# Patient Record
Sex: FEMALE | Race: WHITE | HISPANIC OR LATINO | Employment: UNEMPLOYED | URBAN - METROPOLITAN AREA
[De-identification: names, ages, dates, MRNs, and addresses within clinical notes are randomized per-mention and may not be internally consistent; named-entity substitution may affect disease eponyms.]

---

## 2017-05-10 ENCOUNTER — ALLSCRIPTS OFFICE VISIT (OUTPATIENT)
Dept: OTHER | Facility: OTHER | Age: 27
End: 2017-05-10

## 2017-08-16 ENCOUNTER — ALLSCRIPTS OFFICE VISIT (OUTPATIENT)
Dept: OTHER | Facility: OTHER | Age: 27
End: 2017-08-16

## 2017-08-16 DIAGNOSIS — Z34.91 ENCOUNTER FOR SUPERVISION OF NORMAL PREGNANCY IN FIRST TRIMESTER: ICD-10-CM

## 2017-08-24 ENCOUNTER — APPOINTMENT (OUTPATIENT)
Dept: LAB | Facility: HOSPITAL | Age: 27
End: 2017-08-24
Payer: COMMERCIAL

## 2017-08-24 ENCOUNTER — TRANSCRIBE ORDERS (OUTPATIENT)
Dept: LAB | Facility: HOSPITAL | Age: 27
End: 2017-08-24

## 2017-08-24 DIAGNOSIS — Z34.91 ENCOUNTER FOR SUPERVISION OF NORMAL PREGNANCY IN FIRST TRIMESTER, UNSPECIFIED GRAVIDITY: ICD-10-CM

## 2017-08-24 DIAGNOSIS — Z34.91 ENCOUNTER FOR SUPERVISION OF NORMAL PREGNANCY IN FIRST TRIMESTER, UNSPECIFIED GRAVIDITY: Primary | ICD-10-CM

## 2017-08-24 DIAGNOSIS — Z34.91 ENCOUNTER FOR SUPERVISION OF NORMAL PREGNANCY IN FIRST TRIMESTER: ICD-10-CM

## 2017-08-24 LAB
ABO GROUP BLD: NORMAL
BASOPHILS # BLD AUTO: 0.02 THOUSANDS/ΜL (ref 0–0.1)
BASOPHILS NFR BLD AUTO: 0 % (ref 0–1)
BILIRUB UR QL STRIP: NEGATIVE
BLD GP AB SCN SERPL QL: NEGATIVE
CLARITY UR: CLEAR
COLOR UR: YELLOW
EOSINOPHIL # BLD AUTO: 0.1 THOUSAND/ΜL (ref 0–0.61)
EOSINOPHIL NFR BLD AUTO: 2 % (ref 0–6)
ERYTHROCYTE [DISTWIDTH] IN BLOOD BY AUTOMATED COUNT: 13.7 % (ref 11.6–15.1)
GLUCOSE 1H P 50 G GLC PO SERPL-MCNC: 85 MG/DL
GLUCOSE UR STRIP-MCNC: NEGATIVE MG/DL
HBV SURFACE AG SER QL: NORMAL
HCT VFR BLD AUTO: 37.6 % (ref 34.8–46.1)
HGB BLD-MCNC: 12.5 G/DL (ref 11.5–15.4)
HGB UR QL STRIP.AUTO: NEGATIVE
KETONES UR STRIP-MCNC: NEGATIVE MG/DL
LEUKOCYTE ESTERASE UR QL STRIP: NEGATIVE
LYMPHOCYTES # BLD AUTO: 1.62 THOUSANDS/ΜL (ref 0.6–4.47)
LYMPHOCYTES NFR BLD AUTO: 26 % (ref 14–44)
MCH RBC QN AUTO: 29.9 PG (ref 26.8–34.3)
MCHC RBC AUTO-ENTMCNC: 33.2 G/DL (ref 31.4–37.4)
MCV RBC AUTO: 90 FL (ref 82–98)
MONOCYTES # BLD AUTO: 0.46 THOUSAND/ΜL (ref 0.17–1.22)
MONOCYTES NFR BLD AUTO: 7 % (ref 4–12)
NEUTROPHILS # BLD AUTO: 4.1 THOUSANDS/ΜL (ref 1.85–7.62)
NEUTS SEG NFR BLD AUTO: 65 % (ref 43–75)
NITRITE UR QL STRIP: NEGATIVE
NRBC BLD AUTO-RTO: 0 /100 WBCS
PH UR STRIP.AUTO: 6 [PH] (ref 4.5–8)
PLATELET # BLD AUTO: 239 THOUSANDS/UL (ref 149–390)
PMV BLD AUTO: 11.2 FL (ref 8.9–12.7)
PROT UR STRIP-MCNC: NEGATIVE MG/DL
RBC # BLD AUTO: 4.18 MILLION/UL (ref 3.81–5.12)
RH BLD: POSITIVE
RUBV IGG SERPL IA-ACNC: >175 IU/ML
SP GR UR STRIP.AUTO: 1.02 (ref 1–1.03)
SPECIMEN EXPIRATION DATE: NORMAL
UROBILINOGEN UR QL STRIP.AUTO: 0.2 E.U./DL
WBC # BLD AUTO: 6.31 THOUSAND/UL (ref 4.31–10.16)

## 2017-08-24 PROCEDURE — 87086 URINE CULTURE/COLONY COUNT: CPT

## 2017-08-24 PROCEDURE — 80081 OBSTETRIC PANEL INC HIV TSTG: CPT

## 2017-08-24 PROCEDURE — 81003 URINALYSIS AUTO W/O SCOPE: CPT

## 2017-08-24 PROCEDURE — 82950 GLUCOSE TEST: CPT

## 2017-08-24 PROCEDURE — 36415 COLL VENOUS BLD VENIPUNCTURE: CPT

## 2017-08-25 LAB
BACTERIA UR CULT: NORMAL
RPR SER QL: NORMAL

## 2017-08-27 LAB — HIV 1+2 AB+HIV1 P24 AG SERPL QL IA: NORMAL

## 2017-08-30 ENCOUNTER — GENERIC CONVERSION - ENCOUNTER (OUTPATIENT)
Dept: OTHER | Facility: OTHER | Age: 27
End: 2017-08-30

## 2017-08-30 ENCOUNTER — ALLSCRIPTS OFFICE VISIT (OUTPATIENT)
Dept: OTHER | Facility: OTHER | Age: 27
End: 2017-08-30

## 2017-09-07 ENCOUNTER — GENERIC CONVERSION - ENCOUNTER (OUTPATIENT)
Dept: OTHER | Facility: OTHER | Age: 27
End: 2017-09-07

## 2017-09-07 LAB — CLINICAL REPORT (HISTORICAL): NORMAL

## 2017-09-27 ENCOUNTER — GENERIC CONVERSION - ENCOUNTER (OUTPATIENT)
Dept: OTHER | Facility: OTHER | Age: 27
End: 2017-09-27

## 2017-10-25 ENCOUNTER — GENERIC CONVERSION - ENCOUNTER (OUTPATIENT)
Dept: OTHER | Facility: OTHER | Age: 27
End: 2017-10-25

## 2017-10-25 ENCOUNTER — APPOINTMENT (OUTPATIENT)
Dept: LAB | Facility: HOSPITAL | Age: 27
End: 2017-10-25
Payer: COMMERCIAL

## 2017-10-25 DIAGNOSIS — Z34.92 ENCOUNTER FOR SUPERVISION OF NORMAL PREGNANCY IN SECOND TRIMESTER: ICD-10-CM

## 2017-10-25 PROCEDURE — 36415 COLL VENOUS BLD VENIPUNCTURE: CPT

## 2017-10-25 PROCEDURE — 86336 INHIBIN A: CPT

## 2017-10-25 PROCEDURE — 82105 ALPHA-FETOPROTEIN SERUM: CPT

## 2017-10-25 PROCEDURE — 82677 ASSAY OF ESTRIOL: CPT

## 2017-10-25 PROCEDURE — 84702 CHORIONIC GONADOTROPIN TEST: CPT

## 2017-10-30 LAB
2ND TRIMESTER 4 SCREEN SERPL-IMP: NORMAL
2ND TRIMESTER 4 SCREEN SERPL-IMP: NORMAL
AFP ADJ MOM SERPL: 0.87
AFP SERPL-MCNC: 32 NG/ML
AGE AT DELIVERY: 30 YEARS
FET TS 18 RISK FROM MAT AGE: NORMAL
FET TS 21 RISK FROM MAT AGE: 699
GA METHOD: NORMAL
GA: 18.4 WEEKS
HCG ADJ MOM SERPL: 1.61
HCG SERPL-ACNC: NORMAL MIU/ML
IDDM PATIENT QL: NO
INHIBIN A ADJ MOM SERPL: 0.94
INHIBIN A SERPL-MCNC: 135.51 PG/ML
KARYOTYP BLD/T: NORMAL
MULTIPLE PREGNANCY: NO
NEURAL TUBE DEFECT RISK FETUS: NORMAL %
SERVICE CMNT-IMP: NORMAL
TS 18 RISK FETUS: NORMAL
TS 21 RISK FETUS: 309
U ESTRIOL ADJ MOM SERPL: 0.07
U ESTRIOL SERPL-MCNC: 0.09 NG/ML

## 2017-11-08 ENCOUNTER — ALLSCRIPTS OFFICE VISIT (OUTPATIENT)
Dept: PERINATAL CARE | Facility: CLINIC | Age: 27
End: 2017-11-08
Payer: COMMERCIAL

## 2017-11-08 ENCOUNTER — GENERIC CONVERSION - ENCOUNTER (OUTPATIENT)
Dept: OTHER | Facility: OTHER | Age: 27
End: 2017-11-08

## 2017-11-08 PROCEDURE — 76811 OB US DETAILED SNGL FETUS: CPT | Performed by: OBSTETRICS & GYNECOLOGY

## 2017-11-08 PROCEDURE — 76817 TRANSVAGINAL US OBSTETRIC: CPT | Performed by: OBSTETRICS & GYNECOLOGY

## 2017-11-22 ENCOUNTER — ALLSCRIPTS OFFICE VISIT (OUTPATIENT)
Dept: OTHER | Facility: OTHER | Age: 27
End: 2017-11-22

## 2017-12-06 ENCOUNTER — APPOINTMENT (OUTPATIENT)
Dept: PERINATAL CARE | Facility: CLINIC | Age: 27
End: 2017-12-06
Payer: COMMERCIAL

## 2017-12-06 ENCOUNTER — GENERIC CONVERSION - ENCOUNTER (OUTPATIENT)
Dept: OTHER | Facility: OTHER | Age: 27
End: 2017-12-06

## 2017-12-06 PROCEDURE — 76816 OB US FOLLOW-UP PER FETUS: CPT | Performed by: OBSTETRICS & GYNECOLOGY

## 2017-12-26 DIAGNOSIS — Z34.92 ENCOUNTER FOR SUPERVISION OF NORMAL PREGNANCY IN SECOND TRIMESTER: ICD-10-CM

## 2017-12-27 ENCOUNTER — GENERIC CONVERSION - ENCOUNTER (OUTPATIENT)
Dept: OTHER | Facility: OTHER | Age: 27
End: 2017-12-27

## 2018-01-08 ENCOUNTER — APPOINTMENT (OUTPATIENT)
Dept: LAB | Facility: HOSPITAL | Age: 28
End: 2018-01-08
Payer: COMMERCIAL

## 2018-01-08 ENCOUNTER — TRANSCRIBE ORDERS (OUTPATIENT)
Dept: LAB | Facility: HOSPITAL | Age: 28
End: 2018-01-08

## 2018-01-08 DIAGNOSIS — Z34.92 ENCOUNTER FOR SUPERVISION OF NORMAL PREGNANCY IN SECOND TRIMESTER: ICD-10-CM

## 2018-01-08 LAB
BASOPHILS # BLD AUTO: 0.01 THOUSANDS/ΜL (ref 0–0.1)
BASOPHILS NFR BLD AUTO: 0 % (ref 0–1)
EOSINOPHIL # BLD AUTO: 0.15 THOUSAND/ΜL (ref 0–0.61)
EOSINOPHIL NFR BLD AUTO: 2 % (ref 0–6)
ERYTHROCYTE [DISTWIDTH] IN BLOOD BY AUTOMATED COUNT: 13.7 % (ref 11.6–15.1)
GLUCOSE 1H P 50 G GLC PO SERPL-MCNC: 119 MG/DL
HCT VFR BLD AUTO: 34.5 % (ref 34.8–46.1)
HGB BLD-MCNC: 11.6 G/DL (ref 11.5–15.4)
LYMPHOCYTES # BLD AUTO: 1.02 THOUSANDS/ΜL (ref 0.6–4.47)
LYMPHOCYTES NFR BLD AUTO: 12 % (ref 14–44)
MCH RBC QN AUTO: 30.6 PG (ref 26.8–34.3)
MCHC RBC AUTO-ENTMCNC: 33.6 G/DL (ref 31.4–37.4)
MCV RBC AUTO: 91 FL (ref 82–98)
MONOCYTES # BLD AUTO: 0.59 THOUSAND/ΜL (ref 0.17–1.22)
MONOCYTES NFR BLD AUTO: 7 % (ref 4–12)
NEUTROPHILS # BLD AUTO: 6.88 THOUSANDS/ΜL (ref 1.85–7.62)
NEUTS SEG NFR BLD AUTO: 79 % (ref 43–75)
NRBC BLD AUTO-RTO: 0 /100 WBCS
PLATELET # BLD AUTO: 217 THOUSANDS/UL (ref 149–390)
PMV BLD AUTO: 11 FL (ref 8.9–12.7)
RBC # BLD AUTO: 3.79 MILLION/UL (ref 3.81–5.12)
WBC # BLD AUTO: 8.67 THOUSAND/UL (ref 4.31–10.16)

## 2018-01-08 PROCEDURE — 82950 GLUCOSE TEST: CPT

## 2018-01-08 PROCEDURE — 36415 COLL VENOUS BLD VENIPUNCTURE: CPT

## 2018-01-08 PROCEDURE — 86592 SYPHILIS TEST NON-TREP QUAL: CPT

## 2018-01-08 PROCEDURE — 85025 COMPLETE CBC W/AUTO DIFF WBC: CPT

## 2018-01-09 LAB — RPR SER QL: NORMAL

## 2018-01-09 NOTE — MISCELLANEOUS
Provider Comments  Provider Comments:   patient was a no show on 11/22/2017  Attempted to call patient   phone number not working        Signatures   Electronically signed by : Branden Alexandra, ; Nov 22 2017 11:41AM EST                       (Author)

## 2018-01-10 ENCOUNTER — GENERIC CONVERSION - ENCOUNTER (OUTPATIENT)
Dept: OTHER | Facility: OTHER | Age: 28
End: 2018-01-10

## 2018-01-11 NOTE — PROGRESS NOTES
2017         RE: Casandra Hurtado                                 To: [address]    MR#: [id number]                                    [address] [place] [address]   : [facility], [address]   ENC: [id number]                             Fax: [phone]   (Exam #: [id number])      The LMP of this 32year old,  G3, P2-0-0-2 patient was 2017, giving   her an YARI of MAR 25 2018 and a current gestational age of 25 weeks 3 days   by dates  A sonographic examination was performed on 2017 using real   time equipment  The ultrasound examination was performed using abdominal &   vaginal techniques  The patient has a BMI of 40 8  Her blood pressure   today was 121/77  Earliest US on record: 17  10w3d  3-25-18EDD      Abena is on prenatal vitamins, a calcium supplement and a folic acid   supplement and denies any allergies to drugs  she received a flu shot in   this pregnancy  She reports an OB history that includes 2 prior    section here at [facility] in  and   Her babies weighed   6 lbs  9 oz  and 7 lbs  3 oz her past medical history includes morbid   obesity with a BMI of 40  Her surgical history includes 2 prior   C-sections  She denies any first generation family history of   hypertension, diabetes, thrombosis or congenital anomalies  She had a   normal quad screen in this pregnancy with a one in 309 risk for Jonetta Both, one   in 2723 risk for trace me 18 and a one in 10,000 risk for neural tube   defects  She had a normal early Glucola of 85  She received a flu shot in   this pregnancy        Cardiac motion was observed at 144 bpm       INDICATIONS      fetal anatomical survey   Previous C/S x 2   morbid obesity      Exam Types      Transvaginal   LEVEL II      RESULTS      Fetus # 1 of 1   Breech presentation   Fetal growth appeared normal   Placenta Location = Posterior   No placenta previa   Placenta Grade = II      MEASUREMENTS (* Included In Average GA)      AC              15 0 cm        20 weeks 0 days* (42%)   BPD              4 7 cm        20 weeks 1 day * (45%)   HC              17 9 cm        20 weeks 1 day * (43%)   Femur            3 2 cm        20 weeks 1 day * (36%)      Nuchal Fold      3 1 mm      Humerus          3 1 cm        20 weeks 4 days  (52%)      Cerebellum       2 1 cm        20 weeks 6 days   Biorbit          3 1 cm        20 weeks 1 day   CisternaMagna    6 0 mm      HC/AC           1 19   FL/AC           0 21   FL/BPD          0 68   EFW (Ac/Fl/Hc)   333 grams - 0 lbs 12 oz      THE AVERAGE GESTATIONAL AGE is 20 weeks 1 day +/- 10 days  AMNIOTIC FLUID      Q1:          Q2:          Q3: 4 3      Q4:   BREANN Total = 4 3 cm   Largest Vertical Pocket = 4 3 cm   Amniotic Fluid: Normal      CERVICAL EVALUATION      SUPINE      Cervical Length: 4 90 cm      OTHER TEST RESULTS           Funneling?: No             Dynamic Changes?: No        Resp  To TFP?: No      ANATOMY      Head                                    Normal   Face/Neck                               See Details   Th  Cav  Normal   Heart                                   See Details   Abd  Cav  Normal   Stomach                                 Normal   Right Kidney                            Normal   Left Kidney                             Normal   Bladder                                 Normal   Abd  Wall                               Normal   Spine                                   Normal   Extrems                                 See Details   Genitalia                               Normal   Placenta                                Normal   Umbl   Cord                              Normal   Uterus                                  Normal   PCI                                     Not Visualized      ANATOMY DETAILS      Visualized Appearing Sonographically Normal:   HEAD: (Calvarium, BPD Level, Cavum, Lateral Ventricles, Choroid Plexus,   Cerebellum, Cisterna Magna);    FACE/NECK: (Neck, Nuchal Fold, Orbits,   Face);    TH  CAV  : (Lungs, Diaphragm); HEART: (3VV, 3 Vessel Trachea,   Cardiac Axis, Cardiac Position);    ABD  CAV : (Liver);    STOMACH, RIGHT   KIDNEY, LEFT KIDNEY, BLADDER, ABD  WALL, SPINE: (Cervical Spine, Thoracic   Spine, Lumbar Spine, Sacrum);    EXTREMS: (Lt Humerus, Rt Humerus, Lt   Forearm, Rt Forearm, Lt Femur, Rt Femur, Lt Low Leg, Rt Low Leg);      GENITALIA, PLACENTA, UMBL  CORD, UTERUS      Suboptimally Visualized:   HEART: (Ductal Arch, Aortic Arch, Interventricular Septum, Interatrial   Septum);    EXTREMS: (Lt Hand, Rt Hand)      Not Visualized:   FACE/NECK: (Profile, Nose/Lips, Palate); HEART: (Four Chamber View,   Proximal Left Outflow, Proximal Right Outflow, Short Axis of Greater   Vessels, IVC, SVC); EXTREMS: (Lt Foot, Rt Foot); PCI      ANATOMY COMMENTS      Her survey of the fetal anatomy is not complete  No fetal structural abnormality or ultrasound marker for aneuploidy is   identified on the Level II ultrasound study today  The missed or limited   views above are secondary to her skin thickness and fetal position  Fetal   growth and amniotic fluid volume appear normal   Active movement of the   fetal body & extremities was seen  There is no suspicion of a   subchorionic bleed  The placental cord insertion was not seen  The placenta is low-lying at about 1 8 cm from the internal os  This   should resolve over time  ADNEXA      The left ovary was not visualized  The right ovary was not visualized        IMPRESSION      Gonzalez IUP   20 weeks and 1 day by this ultrasound  (YARI=MAR 27 2018)   Breech presentation   Fetal growth appeared normal   Regular fetal heart rate of 144 bpm   Posterior placenta   No placenta previa      GENERAL COMMENT      OFFICE CONSULT      Dear Jocelynn Tyler,      Thank you for requesting a  consultation on your patient Ms Karlynn Kawasaki   for the following indications:fetal anatomy, and history of a  2   and increased BMI of 40 the language line  number [id number] was   used to  this patient  The patient was informed of the findings and counseled about the   limitations of the exam in detecting all forms of fetal congenital   abnormalities  She denies any vaginal bleeding or uterine cramping/contractions  She does   feel fetal movement  Exam shows she is comfortable and her abdomen is non tender  Maternal obesity is associated with an increased risk for adverse   pregnancy outcomes, including gestational diabetes, fetal growth   abnormalities including macrosomia, fetal structural abnormalities,   preeclampsia, venous thromboembolism, stillbirth at term, and increased   likelihood for  section  Follow up recommended:   1  Recommend a follow-up ultrasound in 4 weeks for missed anatomy and in   12 weeks for growth  2  Recommend weekly nonstress tests/fluid scans from 36 weeks on as her   weight puts her at an increased risk for stillbirth at term  3  She has gained 16 pounds in the pregnancy so far  At her weight she was   notified that she has an increased risk for developing diabetes and   preeclampsia and this risk worsens with increasing weight gain  Recommend   she gain only 10-15 pounds in the pregnancy  She is interested in seeing   nutrition to help in this area  The majority of time (greater then 50%) was spent on counseling and   coordination of care of this patient and /or family member  Approximate   face to face time was 30 minutes  DELFINA Bowen M D     Maternal-Fetal Medicine   Electronically signed 17 18:49

## 2018-01-12 NOTE — MISCELLANEOUS
Reason For Visit  Reason For Visit Free Text Note Form: PN PATIENT SEEN FOR ASSESS  Case Management Documentation [facility]:   Information obtained from the patient and medical record  Patient's financial status unemployed  She is also dealing with additional issues such as language/communication barrier  Action Plan: information provided  Progress Note  SW MET WITH 26 Y/O- M- G3:P2-  Lithuanian SPEAKING WOMAN  PATIENT RESIDES WITH  AND 2 KIDS  PLANED PREGNANCY  PATIENT DENIES ANY USAGE OF DRUG, ALCOHOL OR SMOKING  NO MENTAL HEALTH HISTORY OR DOMESTIC VIOLENCE ISSUES  PATIENT HAS COMMERCIAL INSURANCE  PATIENT NEED TO CALL WIC FOR APPOINTMENT  DENIES A NT QUESTION OR CONCERN  ASSISTED PATIENT AND PROVIDER WITH INTERPRETATION  ENCOURAGED TO CONTACT SW AS NEEDED  Active Problems    1  Encounter for routine gynecological examination (V72 31) (Z01 419)   2  Mild bulimia nervosa (307 51) (F50 2)   3  Mild depression (311) (F32 0)   4  Obese (278 00) (E66 9)   5  Prenatal care in first trimester (V22 1) (Z34 91)   6  Vitamin D deficiency (268 9) (E55 9)    Current Meds   1  Calcium-Vitamin D 500-200 MG-UNIT Oral Tablet; TAKE 1 TABLET DAILY AS DIRECTED; Therapy: [id number] to Recorded   2  Folic Acid Xtra Oral Tablet; Therapy: (Recorded:[id number]) to Recorded   3  Prenatal 28-0 8 MG Oral Tablet; Therapy: (Recorded:[id number]) to Recorded    Allergies    1  No Known Drug Allergies    2  No Known Environmental Allergies   3   No Known Food Allergies    Future Appointments    Date/Time Provider Specialty Site   09/27/2017 10:30 AM BARBARA Prado Ob[facility]Gyne[facility]  [address]     Signatures   Electronically signed by : VENICE Peralta; Aug 30 2017  3:48PM EST                       (Author)

## 2018-01-13 VITALS
DIASTOLIC BLOOD PRESSURE: 59 MMHG | WEIGHT: 204 LBS | HEART RATE: 78 BPM | BODY MASS INDEX: 38.51 KG/M2 | HEIGHT: 61 IN | SYSTOLIC BLOOD PRESSURE: 125 MMHG

## 2018-01-22 VITALS
HEIGHT: 61 IN | BODY MASS INDEX: 39.65 KG/M2 | WEIGHT: 210 LBS | TEMPERATURE: 98.8 F | SYSTOLIC BLOOD PRESSURE: 117 MMHG | HEART RATE: 115 BPM | DIASTOLIC BLOOD PRESSURE: 77 MMHG

## 2018-01-22 VITALS
DIASTOLIC BLOOD PRESSURE: 56 MMHG | WEIGHT: 210.25 LBS | HEIGHT: 61 IN | RESPIRATION RATE: 16 BRPM | HEART RATE: 80 BPM | BODY MASS INDEX: 39.7 KG/M2 | SYSTOLIC BLOOD PRESSURE: 117 MMHG

## 2018-01-22 VITALS
SYSTOLIC BLOOD PRESSURE: 121 MMHG | WEIGHT: 216 LBS | HEIGHT: 61 IN | BODY MASS INDEX: 40.78 KG/M2 | DIASTOLIC BLOOD PRESSURE: 71 MMHG

## 2018-01-22 VITALS — WEIGHT: 212.25 LBS | BODY MASS INDEX: 40.1 KG/M2 | SYSTOLIC BLOOD PRESSURE: 102 MMHG | DIASTOLIC BLOOD PRESSURE: 66 MMHG

## 2018-01-22 VITALS
BODY MASS INDEX: 40.17 KG/M2 | DIASTOLIC BLOOD PRESSURE: 73 MMHG | HEART RATE: 78 BPM | SYSTOLIC BLOOD PRESSURE: 115 MMHG | HEIGHT: 61 IN | WEIGHT: 212.8 LBS

## 2018-01-23 NOTE — PROGRESS NOTES
DEC 6 2017         RE: Elizabeth Alejandro                                 To: [address]   MR#: [id number]                                    [facility]   : [address], [address]   ENC: [id number]                             Fax: [phone]   (Exam #: [id number])      The LMP of this 32year old,  G3, P2-0-0-2 patient was 2017, giving   her an YARI of MAR 25 2018 and a current gestational age of 23 weeks 3 days   by dates  A sonographic examination was performed on DEC 6 2017 using real   time equipment  The ultrasound examination was performed using abdominal   technique  The patient has a BMI of 40 4  Her blood pressure today was   127/65  Earliest US on record: 17  10w3d  3-25-18EDD      Cardiac motion was observed at 143 bpm       INDICATIONS      morbid obesity   missed anatomy follow up      Exam Types      Level I      RESULTS      Fetus # 1 of 1   Vertex presentation   Placenta Location = Posterior   Placenta Grade = I      AMNIOTIC FLUID      Q1: 3 2      Q2: 3 3      Q3: 4 2      Q4: 4 8   BREANN Total = 15 6 cm   Amniotic Fluid: Normal      ANATOMY COMMENTS      Previously, multiple anatomic targets were suboptimally imaged on the   level II ultrasound study, including the left hand, facial profile,   coronal view of the nose and lips, palate, cardiac ductal arch, aortic   arch, septal, four-chamber, outflow tract, IVC, and SVC views  These   anatomic targets appear normal today  Additional anatomic targets were   suboptimally imaged on the level II ultrasound study, including the right   hand, short axis view of the great vessels, placental cord insertion site,   and distal lower extremities  These anatomic targets remain suboptimally   imaged secondary to the constraints related to maternal morbid obesity and   unfavorable fetal position        IMPRESSION      Gonzalez IUP   Vertex presentation   Regular fetal heart rate of 143 bpm   Posterior placenta      GENERAL COMMENT      Detailed evaluation of fetal growth and anatomy was not performed at the   visit today  Instead, attention was drawn toward assessment of anatomic   targets not imaged well on the level II study  The amniotic fluid volume   and placenta appear normal, though transvaginal sonography was not   performed to assess placental location (previous suspicion of low-lying   placentation)  Repeat assessment of fetal interval growth and evaluation of anatomic   targets not imaged well today, along with evaluation for placental   location, will be performed at 32 weeks gestation  Weekly non stress   testing is recommended for the indication of morbid obesity beginning at   36 weeks gestation, sooner if otherwise clinically indicated  RECOMMENDATION      Growth Ultrasound: at 32 weeks      DELFINA Santa M D     Maternal-Fetal Medicine   Electronically signed 12/06/17 14:36

## 2018-01-24 ENCOUNTER — OFFICE VISIT (OUTPATIENT)
Dept: OBGYN CLINIC | Facility: HOSPITAL | Age: 28
End: 2018-01-24
Payer: COMMERCIAL

## 2018-01-24 ENCOUNTER — OB ABSTRACT (OUTPATIENT)
Dept: OBGYN CLINIC | Facility: HOSPITAL | Age: 28
End: 2018-01-24

## 2018-01-24 VITALS
WEIGHT: 219 LBS | BODY MASS INDEX: 41.38 KG/M2 | SYSTOLIC BLOOD PRESSURE: 110 MMHG | HEART RATE: 86 BPM | DIASTOLIC BLOOD PRESSURE: 72 MMHG

## 2018-01-24 VITALS
BODY MASS INDEX: 40.4 KG/M2 | WEIGHT: 214 LBS | SYSTOLIC BLOOD PRESSURE: 127 MMHG | HEIGHT: 61 IN | DIASTOLIC BLOOD PRESSURE: 65 MMHG

## 2018-01-24 VITALS
BODY MASS INDEX: 40.97 KG/M2 | WEIGHT: 217 LBS | SYSTOLIC BLOOD PRESSURE: 120 MMHG | DIASTOLIC BLOOD PRESSURE: 56 MMHG | HEIGHT: 61 IN

## 2018-01-24 VITALS
DIASTOLIC BLOOD PRESSURE: 76 MMHG | WEIGHT: 221 LBS | HEART RATE: 114 BPM | HEIGHT: 61 IN | BODY MASS INDEX: 41.72 KG/M2 | SYSTOLIC BLOOD PRESSURE: 117 MMHG

## 2018-01-24 DIAGNOSIS — Z3A.31 31 WEEKS GESTATION OF PREGNANCY: Primary | ICD-10-CM

## 2018-01-24 PROBLEM — E55.9 VITAMIN D DEFICIENCY: Status: ACTIVE | Noted: 2018-01-24

## 2018-01-24 PROBLEM — IMO0001 SPANISH SPEAKING PATIENT: Status: ACTIVE | Noted: 2018-01-24

## 2018-01-24 PROBLEM — E66.01 MATERNAL MORBID OBESITY, ANTEPARTUM (HCC): Status: ACTIVE | Noted: 2018-01-24

## 2018-01-24 PROBLEM — O44.40 LOW LYING PLACENTA, ANTEPARTUM: Status: ACTIVE | Noted: 2018-01-24

## 2018-01-24 PROBLEM — O34.219 PREVIOUS CESAREAN SECTION COMPLICATING PREGNANCY: Status: ACTIVE | Noted: 2018-01-24

## 2018-01-24 PROBLEM — O99.210 MATERNAL MORBID OBESITY, ANTEPARTUM (HCC): Status: ACTIVE | Noted: 2018-01-24

## 2018-01-24 PROBLEM — F32.A MILD DEPRESSION: Status: ACTIVE | Noted: 2018-01-24

## 2018-01-24 LAB
SL AMB  POCT GLUCOSE, UA: NEGATIVE
SL AMB POCT TOTAL PROTEIN: ABNORMAL

## 2018-01-24 PROCEDURE — 99213 OFFICE O/P EST LOW 20 MIN: CPT | Performed by: NURSE PRACTITIONER

## 2018-01-24 PROCEDURE — 81003 URINALYSIS AUTO W/O SCOPE: CPT | Performed by: NURSE PRACTITIONER

## 2018-01-24 RX ORDER — SWAB
1 SWAB, NON-MEDICATED MISCELLANEOUS DAILY
COMMUNITY
End: 2019-09-25

## 2018-01-24 RX ORDER — FOLIC ACID 1 MG/1
TABLET ORAL DAILY
COMMUNITY
End: 2020-03-21 | Stop reason: HOSPADM

## 2018-01-24 RX ORDER — LANOLIN ALCOHOL/MO/W.PET/CERES
CREAM (GRAM) TOPICAL
COMMUNITY
End: 2020-03-21 | Stop reason: HOSPADM

## 2018-01-24 NOTE — PATIENT INSTRUCTIONS
Parto Prematuro   CUIDADO AMBULATORIO:   El parto prematuro  ocurre cuando el útero se contrae y el cérvix se abre antes de lo normal  El cérvix es la apertura del [place]  En el parto prematuro, las contracciones son lo suficientemente margarita y frecuentes para permitir al bushra del útero abrirse para que nazca el bebé  El parto prematuro ocurre después de la semana 20 de embarazo kevin antes de la semana 40 de [address]  El [place] de parto prematuro podría provocar que tenga al bebé antes de que esté listo para nacer  Los signos y síntomas más comunes incluyen los siguientes:  Es posible que usted no sepa que está teniendo un parto prematuro  Es normal tener contracciones de parto prematuro (endurecimiento y relajamiento del útero) kevin sin notarlas  Los siguientes son algunos signos y síntomas que pudieran sugerir un parto prematuro:  · Contracciones que se hacen más margarita y más seguidas    · Cambios en el flujo vaginal, cinthya mas secreción o secreción que es acuosa o con jesica     · Dolor en la parte baja de la espalda     · Presión en la parte baja del abdomen     · Manchado o sangrado vaginal  Llame al 911 en maria e de presentar lo siguiente:   · Usted nota o siente cinthya si tuviera algo en la vagina  Busque atención médica de inmediato si:   · Usted tiene sangrado vaginal caba brillante o sin dolor  · Randa síntomas no mejoran o más evelyn éstos empeoran  · Usted rompe domingo o siente un chorro o gotas de agua tibia que le está bajando por hammer vagina  · Usted tiene contracciones que se hacen más margarita y más seguidas por más de 1 hora  Pregúntele a hammer [place] vitaminas y minerales son adecuados para usted  · Usted nota gomez disminución en los movimientos de hammer bebé  · Usted tiene calambres, presión o tensión abdominal     · Usted tiene un cambio en la secreción vaginal     · Usted tiene fiebre  · Usted tiene ardor al orinar o está orinando menos de lo que es normal para usted      · Usted tiene preguntas o inquietudes acerca de hammer condición o cuidado  El tratamiento para el parto prematuro  podría atrasar el alumbramiento  Es posible que usted necesite alguno de los siguientes:  · Guardar reposo en cama  podría recomendarse  Es probable que usted necesite acostarse sobre el lado ismael, lo cual mejora la circulación hacia hammer útero y hammer bebé  Hammer médico le indicará cuando se puede levantar de la cama  · Medicamento  podrían administrase para parar las contracciones si hammer bebé no está listo para nacer  Es probable que usted también necesite ciertos medicamentos si hammer trabajo de parto prematuro no puede ser detenido y hammer médico piensa que usted tendrá a hammer bebé antes de [place]  Estos medicamentos ayudan a los pulmones, cerebro y órganos digestivos de hammer bebé a madurar  También ayudan a disminuir el riesgo que hammer bebé corre de nacer con parálisis cerebral  Los antibióticos podrían administrarse para tratar gomez infección bacterial, si fuera necesario  Cuidados personales:   · Descanse  lo más posible  Es probable que usted necesite acostarse sobre hammer lado ismael para mejorar la circulación al [place] y a hammer bebé  También es probable que usted logre prevenir el parto prematuro al descansar y reducir hammer actividad física  · Pregúntele a hammer médico [place] que son seguras para usted  Es probable que hammer médico u obstetricia le recomiende que evite las [employer]  Pregúntele a hammer médico si es seguro para usted hacer ejercicio  · [address] se le haya indicado  Pregunte cuánto líquido debe moisés cada día y cuáles líquidos son los más adecuados para usted  · No fume  Es probable que hammer bebé no crezca evelyn y que pese menos al nacer si usted fuma eloisa el [place]  El uso del cigarrillo también aumenta el riesgo de que hammer bebé nazca demasiado temprano  La nicotina y otras sustancias químicas que contienen los cigarrillos y cigarros pueden dañar los pulmones  Pida información a marks médico si usted actualmente fuma y necesita ayuda para dejar de fumar  Los cigarrillos electrónicos o tabaco sin humo todavía contienen nicotina  Consulte con marks médico antes de [employer]  · No consuma alcohol  El alcohol puede dañar al feto y causar parto prematuro  · Mantenga un peso saludable  Un peso saludable podría prevenir el parto prematuro  Pregunte a marks médico cuánto peso usted debería aumentar eloisa marks [place]  Acuda a safia consultas de control con marks médico según le indicaron  Anote safia preguntas para que se acuerde de hacerlas eloisa safia visitas  © 2017 [address] Information is for End User's use only and may not be sold, redistributed or otherwise used for commercial purposes  All illustrations and images included in CareNotes® are the copyrighted property of A D A M , Inc  or Logan Meyer  Esta información es sólo para uso en educación  Marks intención no es darle un consejo médico sobre enfermedades o tratamientos  Colsulte con marks Jenifer Mall farmacéutico antes de seguir cualquier régimen médico para saber si es seguro y efectivo para usted

## 2018-01-24 NOTE — PROGRESS NOTES
David Muse,   Certified  used for today's encounter  Denies loss of fluid, vaginal bleeding abdominal pain  Confirms frequent fetal movement  Tolerating prenatal vitamin well  Did not make appointment for nutrition consult  Does not plan on following through with nutrition consult  Plan:  1  Continue prenatal vitamin daily  2    Continue fetal kick counts  3    Common discomforts of pregnancy reviewed  Precautions reviewed    RTO 2 weeks

## 2018-02-14 ENCOUNTER — PATIENT OUTREACH (OUTPATIENT)
Dept: OBGYN CLINIC | Facility: HOSPITAL | Age: 28
End: 2018-02-14

## 2018-02-14 ENCOUNTER — OFFICE VISIT (OUTPATIENT)
Dept: OBGYN CLINIC | Facility: HOSPITAL | Age: 28
End: 2018-02-14

## 2018-02-14 VITALS — SYSTOLIC BLOOD PRESSURE: 115 MMHG | BODY MASS INDEX: 41.76 KG/M2 | DIASTOLIC BLOOD PRESSURE: 77 MMHG | WEIGHT: 221 LBS

## 2018-02-14 DIAGNOSIS — Z23 NEED FOR PERTUSSIS VACCINATION: Primary | ICD-10-CM

## 2018-02-14 DIAGNOSIS — O34.219 PREVIOUS CESAREAN SECTION COMPLICATING PREGNANCY: ICD-10-CM

## 2018-02-14 DIAGNOSIS — Z34.93 PRENATAL CARE IN THIRD TRIMESTER: ICD-10-CM

## 2018-02-14 DIAGNOSIS — Z3A.34 34 WEEKS GESTATION OF PREGNANCY: ICD-10-CM

## 2018-02-14 PROCEDURE — PNV: Performed by: NURSE PRACTITIONER

## 2018-02-14 NOTE — PATIENT INSTRUCTIONS
Call [facility] for growth scan - [phone]    Conteo de patadas en el embarazo   CUIDADO AMBULATORIO:   El conteo de patadas  mide cuánto se está moviendo hammer bebé en el útero  Gomez patada de hammer bebé podría sentirse cinthya gomez torcedura, gomez vuelta, un crujido, un meneo o un golpe  Es común sentir a tu bebé patear a las 32 a 29 semanas de [place]  Es posible que sienta al bebé patear ya a las 20 semanas de [place]  Busque atención médica de inmediato si:   · Usted siente que hammer bebé patea menos conforme pasa el día  · Usted no siente ninguna patada en un día  Pregúntele a hammer [place] vitaminas y minerales son adecuados para usted  · Usted siente un cambio en el número de patadas o movimientos de hammer bebé  · Usted siente menos de 10 patadas dentro de 2 horas después de contar 2 veces  · Usted tiene preguntas o inquietudes acerca de los movimientos de hammer bebé  Por qué realizar el conteo de patadas:  Los movimientos de hammer bebé podrían proporcionar información de la jeronimo de hammer bebé  Es posible que si hay problemas, hammer bebé se mueva menos o nada en lo absoluto  Él podría moverse menos si no tiene suficiente espacio para desarrollarse en hammer útero (vientre), o si no está obteniendo suficiente oxígeno o nutrientes de la placenta  Informe a hammer médico tan pronto cinthya usted sienta un cambio en los movimientos de hammer bebé  Los problemas que se encuentran en gomez etapa más temprana son más fáciles de tratar  ¿Cuándo tengo que realizar el conteo de patadas? Cuándo realizar el conteo de patadas:   · Cuente las patadas en el mismo horario todos los GRASSE  · Realice el conteo de las patadas cuando hammer bebé esté despierto y [place]  Hammer bebé podría estar más activo en la tarde  · Realice el conteo de las patadas después de comer o moisés un refrigerio  Hammer bebé podría estar más activo después de que usted coma  Espere 2 horas después de beber líquidos que contengan cafeína   La cafeína puede hacer que hammer bebé esté más activo que lo normal     · Usted no debe fumar mientras está embarazada  El fumar aumenta el riesgo de problemas de jeronimo para usted y para hammer bebé eloisa el [facility]  Si usted fuma, espere 2 horas para realizar el conteo de patadas  La nicotina puede hacer que hammer bebé sea más activo de lo usual   Cómo realizar el conteo de patadas:  Revise que hammer bebé esté despierto antes de realizar el conteo de patadas  Usted puede despertar a hammer bebé empujando hammer estómago suavemente, caminando o tomando algo frío  Hammer médico podría indicarle diferentes maneras de realizar el conteo  Él podría decirle que connie lo siguiente:  · Use gomez gráfica o un reloj para mantener un registro de la hora en que comienza y termina de contar  · Siéntese en gomez silla o acuéstese en hammer costado derecho  · Coloque safia mariangel en la parte más mike de hammer [id number]  · Cuente hasta que llegue a las 10 patadas  Escriba cuánto tiempo le lleva contar las 10 patadas  · Podría moisés de 30 minutos a 2 horas para contar 10 patadas  No debería de moisés más de 2 horas para contar 10 patadas  · Si usted no siente las 10 patadas dentro de 2 horas, espere 1 hora y cuente de [place]  Hammer bebé puede dormir hasta por 40 minutos a la vez  Acuda a safia consultas de control con hammer médico según le indicaron  Anote safia preguntas para que se acuerde de hacerlas eloisa safia visitas  © 2017 [address] Information is for End User's use only and may not be sold, redistributed or otherwise used for commercial purposes  All illustrations and images included in CareNotes® are the copyrighted property of [employer]  or Logan Betsy  Esta información es sólo para uso en educación  Hammer intención no es darle un consejo médico sobre enfermedades o tratamientos  Colsulte con hammer Candido Lauth farmacéutico antes de seguir cualquier régimen médico para saber si es seguro y efectivo para usted    El embarazo de la semana 32 a la 34   LO QUE NECESITA SABER:   ¿Qué cambios están ocurriendo en mi cuerpo? Es posible que usted continúe teniendo síntomas tales cinthya falta de [place], [place], contracciones o inflamación en safia tobillos y pies  Usted podría estar aumentando 1 ming por semana ahora  ¿Cómo me estrella cuidar en esta etapa de mi embarazo? · Consuma alimentos saludables y variados  Alimentos saludables incluyen frutas, verduras, panes de adair integral, alimentos lácteos bajos en grasa, frijoles, job magras y pescado  [place] líquidos cinthya se le haya indicado  Pregunte cuánto líquido debe moisés cada día y cuáles líquidos son los más adecuados para usted  Limite el consumo de cafeína a menos de [address]  Limite el consumo de pescado a 2 porciones cada semana  Escoja pescado con concentraciones bajas de trung cinthya atún al natural enlatado, camarón, salmón, bacalao o tilapia  No  coma pescado con concentraciones altas de trung cinthya pez charly, caballa gigante, pargo rayado y tiburón  · Controle la acidez  comiendo 4 o 5 comidas pequeñas cada día en vez de comidas grandes  Evite los alimentos picantes  · Controle la inflamación  al The   [employer] y poner los pies en alto o elevados sobre [place]  · [id number]  Hammer necesidad de ciertas vitaminas y [address] [facility], cinthya el ácido fólico, aumenta eloisa el [facility]  Las vitaminas prenatales proporcionan algunas de las vitaminas y minerales adicionales que usted necesita  Las vitaminas prenatales también podrían ayudar a disminuir el riesgo de ciertos defectos de nacimiento  · Consulte con hammer médico acerca de hacer ejercicio  El ejercicio moderado puede ayudarla a mantenerse en forma  Hammer médico la ayudará a planear un programa de ejercicios que sea seguro para usted eloisa hammer [place]  · No fume  Si usted fuma, nunca es demasiado tarde para dejar de hacerlo   Fumar aumenta el riesgo de aborto espontáneo y [de-identified] problemas de jeronimo eloisa hammer [place]  Fumar puede causar que hammer bebé nazca antes de tiempo o que pese menos al nacer  Solicite información a hammer médico si usted necesita ayuda para dejar de fumar  · No consuma alcohol  El alcohol pasa de hammer cuerpo al bebé a través de la placenta  Puede afectar el desarrollo del cerebro de hammer bebé y provocar el síndrome de alcoholismo fetal (SAF)  FAS es un vanessa de condiciones que causan [address], de comportamiento y de crecimiento  · Consulte con hammer médico antes de moisés cualquier medicamento  Muchos medicamentos pueden perjudicar a hammer bebé si usted los eunice [address]  No tome ningún medicamento, vitaminas, hierbas o suplementos sin marilia consultar con hammer [place]  use drogas ilegales o de la griggs (cinthya marihuana o cocaína) mientras está embarazada  ¿Cuáles son [place]aces consejos de seguridad eloisa el embarazo? · Evite jacuzzis y saunas  No use un jacuzzi o un sauna mientras usted está embarazada, especialmente eloisa el primer trimestre  Los [facility] y los saunas aumentan la temperatura de hammer bebé y el riesgo de defectos de nacimiento  · Evite la toxoplasmosis  [place] es gomez infección causada por comer carne cruda o estar cerca del excremento de un courtney infectado  [place] puede causar malformaciones congénitas, aborto espontáneo y Farhan Schein  Lávese las mariangel después de tocar carne cruda  Asegúrese de que la carne esté evelyn cocida antes de comerla  Evite los huevos crudos y la Erna Willie  Use guantes o pida que alguien la ayude a limpiar la caja de arena del courtney mientras usted [address]  ¿Qué cambios están ocurriendo con mi bebé? Para las [address], hammer bebé podría pesar más de 5 libras  Hammer bebé medirá alrededor de 12 ½ pulgadas de leia desde el dina de la jarett hasta la rabadilla (parte inferior del bebé)  Hammer bebé está aumentando alrededor de ½ ming por semana  Ahora hammer bebé puede abrir y cerrar safia ojos   Las patadas y movimientos de hammer bebé son más margarita en trinity momento  ¿Qué necesito saber acerca del cuidado prenatal?  Hammer médico le revisará hammer presión arterial y [place]  Es posible que también necesite lo siguiente:  · Un examen de orina  también podría realizarse para revisarle el azúcar y la proteína  Estas son señales de diabetes gestacional o de infección  La proteína en hammer orina también podría ser gomez señal de preeclampsia  La preeclampsia es gomez condición que puede desarrollarse eloisa la semana 21 o después en hammer embarazo  Esta provoca presión arterial manolo y [employer] con safia riñones y [place]  · La vacuna Tdap  podría ser recomendado por hammer médico      · La altura uterina  es gomez medición del útero para controlar el desarrollo de hammer bebé  [employer] por lo general es igual al número de [facility] que usted tiene de embarazo  Hammer médico también podría revisar la posición de hammer bebé  · El ritmo cardíaco de hammer bebé  será revisado  ¿Cuándo estrella buscar atención inmediata? · Usted presenta un mamie dolor de jarett que no desaparece  · Usted tiene cambios en la visión nuevos o en aumento, cinthya visión borrosa o con manchas  · Usted tiene inflamación nueva o creciente en hammer cesar o mariangel  · Usted tiene manchado o sangrado vaginal     · Usted rompe domingo o siente un chorro o gotas de agua tibia que le está bajando por hammer vagina  ¿Cuándo estrella comunicarme con mi médico?   · Usted tiene más de 5 contracciones en 1 hora  · Usted nota algún cambio en los movimientos de hammer bebé  · Usted tiene calambres, presión o tensión abdominal     · Usted tiene un cambio en la secreción vaginal     · Usted tiene escalofríos o fiebre  · Usted tiene comezón, ardor o dolor vaginal      · Usted tiene gomez secreción vaginal amarillenta, verdosa, tanya o de [employer]  · Usted tiene dolor o ardor al [place], orina menos de lo habitual o tiene [facility] rosada o sanguinolenta      · Usted tiene preguntas o inquietudes acerca de branch condición o cuidado  ACUERDOS SOBRE BRANCH CUIDADO:   Usted tiene el derecho de ayudar a planear branch cuidado  Aprenda todo lo que pueda sobre branch condición y cinthya darle tratamiento  Discuta safia opciones de tratamiento con safia médicos para decidir el cuidado que usted desea recibir  Usted siempre tiene el derecho de rechazar el tratamiento  Esta información es sólo para uso en educación  Branch intención no es darle un consejo médico sobre enfermedades o tratamientos  Colsulte con branch Maty Antis farmacéutico antes de seguir cualquier régimen médico para saber si es seguro y efectivo para usted  © 2017 [address] Information is for End User's use only and may not be sold, redistributed or otherwise used for commercial purposes  All illustrations and images included in CareNotes® are the copyrighted property of A D A M , Inc  or Logan Meyer

## 2018-02-14 NOTE — PROGRESS NOTES
Juan Rascon,  Certified  used for today's encounter  Denies loss of fluid, vaginal bleeding and abdominal pain  Complains of irregular contractions  Confirms frequent fetal movement  Tolerating prenatal vitamin well  Missed 32 week growth scan due to snow  Encouraged patient to call and reschedule  Reviewed with patient she also needs antinatal fetal surveillance  Patient verbalizes understanding  Prior C-sections x2 plans for repeat   Reviewed with patient we will schedule at next office visit  [place] / parity reviewed and updated at this visit  OB history reviewed and updated at this visit prior history stated classical  x2 this was incorrect patient indeed had low-transverse  x2 according to the op note  PLAN:   1  Continue fetal kick counts daily  2  Continue prenatal vitamin daily  3  Make appointment at  Center for growth scan and  fetal surveillance  4  Had flu and Tdap vaccine this pregnancy  5  Checking  in card provided to patient  6  Common discomforts and precautions reviewed    Patient verbalizes understanding how to reach [facility] on-call after office hours  RTO 2 weeks  Will need GBS at next visit follow-up regarding  Center ultrasound and  fetal surveillance

## 2018-02-28 ENCOUNTER — ROUTINE PRENATAL (OUTPATIENT)
Dept: OBGYN CLINIC | Facility: HOSPITAL | Age: 28
End: 2018-02-28

## 2018-02-28 VITALS
SYSTOLIC BLOOD PRESSURE: 120 MMHG | HEART RATE: 105 BPM | BODY MASS INDEX: 38.07 KG/M2 | WEIGHT: 223 LBS | DIASTOLIC BLOOD PRESSURE: 82 MMHG | HEIGHT: 64 IN

## 2018-02-28 DIAGNOSIS — Z34.93 PRENATAL CARE IN THIRD TRIMESTER: Primary | ICD-10-CM

## 2018-02-28 PROBLEM — Z3A.36 36 WEEKS GESTATION OF PREGNANCY: Status: ACTIVE | Noted: 2018-02-14

## 2018-02-28 PROCEDURE — PNV: Performed by: OBSTETRICS & GYNECOLOGY

## 2018-02-28 PROCEDURE — 87653 STREP B DNA AMP PROBE: CPT | Performed by: OBSTETRICS & GYNECOLOGY

## 2018-02-28 NOTE — PROGRESS NOTES
Subjective     Herminia Karlynn Kawasaki is a 32 y o  female being seen today for her obstetrical visit  She is at 36w3d gestation  Patient reports no bleeding, no contractions and no leaking  Fetal movement: normal     Menstrual History:  OB History      Para Term  AB Living    3 2 2 0 0 2    SAB TAB Ectopic Multiple Live Births    0 0 0 0 2           Patient's last menstrual period was 2017  The following portions of the patient's history were reviewed and updated as appropriate: allergies, current medications, past family history, past medical history, past social history, past surgical history and problem list     Review of Systems  Pertinent items are noted in HPI  Objective     /82 (BP Location: Left arm)   Pulse 105   Ht 5' 4" (1 626 m)   Wt 101 kg (223 lb)   LMP 2017   BMI 38 28 kg/m²   FHT:  145 BPM   Uterine Size: size equals dates   Presentation: unsure          Problem List Items Addressed This Visit     Prenatal care in third trimester - Primary    Relevant Orders    Strep B DNA probe, amplification            Assessment     Pregnancy 36 and 3/7 weeks     Plan     28-week labs reviewed, normal  -GBS collected today  -RLTCS scheduled for 3/19/18 at 12:30  Patient needs fetal surveillance  -had an appointment at [facility] on , however, she waited too long and left before being seen  She does not desire to return  She was counseled EXTENSIVELY in her native language [place] as the ) as to why she requires fetal surveillance and that it is recommended by the [facility] physicians  She expressed an understanding of all discussed and was advised to follow up this week  Follow up in 1 Week

## 2018-03-02 ENCOUNTER — TELEPHONE (OUTPATIENT)
Dept: OBGYN CLINIC | Facility: CLINIC | Age: 28
End: 2018-03-02

## 2018-03-02 NOTE — LETTER
March 2, 2018     [address] [facility] [address]    Patient: Rudolph Miller   YOB: 1990   Date of Visit: 3/2/2018       Dear Dr Carol Cox:    Thank you for referring Rudolph Miller to me for evaluation  Below are my notes for this consultation  If you have questions, please do not hesitate to call me  I look forward to following your patient along with you           Sincerely,        Belkys Lane RN        CC: No Recipients

## 2018-03-03 LAB — GP B STREP DNA SPEC QL NAA+PROBE: NORMAL

## 2018-03-06 ENCOUNTER — ROUTINE PRENATAL (OUTPATIENT)
Dept: OBGYN CLINIC | Facility: HOSPITAL | Age: 28
End: 2018-03-06
Payer: COMMERCIAL

## 2018-03-06 VITALS
BODY MASS INDEX: 38.11 KG/M2 | WEIGHT: 222 LBS | SYSTOLIC BLOOD PRESSURE: 111 MMHG | DIASTOLIC BLOOD PRESSURE: 68 MMHG | HEART RATE: 79 BPM

## 2018-03-06 DIAGNOSIS — Z3A.37 37 WEEKS GESTATION OF PREGNANCY: ICD-10-CM

## 2018-03-06 DIAGNOSIS — Z34.93 PRENATAL CARE IN THIRD TRIMESTER: Primary | ICD-10-CM

## 2018-03-06 DIAGNOSIS — E66.01 MORBID OBESITY WITH BMI OF 40.0-44.9, ADULT (HCC): ICD-10-CM

## 2018-03-06 LAB
SL AMB  POCT GLUCOSE, UA: ABNORMAL
SL AMB POCT URINE PROTEIN: ABNORMAL

## 2018-03-06 PROCEDURE — PNV: Performed by: OBSTETRICS & GYNECOLOGY

## 2018-03-06 PROCEDURE — 81002 URINALYSIS NONAUTO W/O SCOPE: CPT | Performed by: OBSTETRICS & GYNECOLOGY

## 2018-03-06 NOTE — PROGRESS NOTES
Pt presents today for prenatal visit at 42w2d  She denies vaginal bleeding, LOF, ctx  Reports positive fetal movement  She has scheduled RLTCS on 3/19/18  She states she tried to call [facility] to schedule appointment but was told by  that she needed an order  Upon review of her records, it appears she was supposed to have f/u growth US at 32 weeks and APFS to begin at [address] [facility] in setting of morbid obesity  She had an appointment on 2/21 that she went to, but after waiting too long, she left without being seen  She was thoroughly counseled at the last visit on the importance of APFS      Certified [facility] , [place], was used to conduct interview    1) MFM referral ordered, pt to schedule appointment; pt verbalized understanding and in agreement  2) GBS reviewed and negative  3) RTO in 1 week    Justo Kent MD  OBGYN, PGY-1  3/6/2018 12:12 PM

## 2018-03-15 ENCOUNTER — PATIENT OUTREACH (OUTPATIENT)
Dept: OBGYN CLINIC | Facility: HOSPITAL | Age: 28
End: 2018-03-15

## 2018-03-15 ENCOUNTER — ROUTINE PRENATAL (OUTPATIENT)
Dept: OBGYN CLINIC | Facility: HOSPITAL | Age: 28
End: 2018-03-15
Payer: COMMERCIAL

## 2018-03-15 VITALS
BODY MASS INDEX: 39.03 KG/M2 | HEART RATE: 85 BPM | WEIGHT: 227.4 LBS | SYSTOLIC BLOOD PRESSURE: 110 MMHG | DIASTOLIC BLOOD PRESSURE: 59 MMHG

## 2018-03-15 DIAGNOSIS — O99.210 MATERNAL MORBID OBESITY, ANTEPARTUM (HCC): ICD-10-CM

## 2018-03-15 DIAGNOSIS — E66.01 MATERNAL MORBID OBESITY, ANTEPARTUM (HCC): ICD-10-CM

## 2018-03-15 DIAGNOSIS — Z34.93 PRENATAL CARE IN THIRD TRIMESTER: Primary | ICD-10-CM

## 2018-03-15 DIAGNOSIS — Z3A.37 37 WEEKS GESTATION OF PREGNANCY: ICD-10-CM

## 2018-03-15 LAB
SL AMB  POCT GLUCOSE, UA: NEGATIVE
SL AMB POCT URINE PROTEIN: NEGATIVE

## 2018-03-15 PROCEDURE — 81002 URINALYSIS NONAUTO W/O SCOPE: CPT | Performed by: NURSE PRACTITIONER

## 2018-03-15 PROCEDURE — PNV: Performed by: NURSE PRACTITIONER

## 2018-03-15 NOTE — PATIENT INSTRUCTIONS
Conteo de patadas en el embarazo   CUIDADO AMBULATORIO:   El conteo de patadas  mide cuánto se está moviendo hammer bebé en el útero  Gomez patada de hammer bebé podría sentirse cinthya gomez torcedura, gomez vuelta, un crujido, un meneo o un golpe  Es común sentir a tu bebé patear a las 32 a 29 semanas de [place]  Es posible que sienta al bebé patear ya a las 20 semanas de [place]  Busque atención médica de inmediato si:   · Usted siente que hammer bebé patea menos conforme pasa el día  · Usted no siente ninguna patada en un día  Pregúntele a hammer [place] vitaminas y minerales son adecuados para usted  · Usted siente un cambio en el número de patadas o movimientos de hammer bebé  · Usted siente menos de 10 patadas dentro de 2 horas después de contar 2 veces  · Usted tiene preguntas o inquietudes acerca de los movimientos de hammer bebé  Por qué realizar el conteo de patadas:  Los movimientos de hammer bebé podrían proporcionar información de la jeronimo de hammer bebé  Es posible que si hay problemas, hammer bebé se mueva menos o nada en lo absoluto  Él podría moverse menos si no tiene suficiente espacio para desarrollarse en hammer útero (vientre), o si no está obteniendo suficiente oxígeno o nutrientes de la placenta  Informe a hammer médico tan pronto cinthya usted sienta un cambio en los movimientos de hammer bebé  Los problemas que se encuentran en gomez etapa más temprana son más fáciles de tratar  ¿Cuándo tengo que realizar el conteo de patadas? Cuándo realizar el conteo de patadas:   · Cuente las patadas en el mismo horario todos los GRASSE  · Realice el conteo de las patadas cuando hammer bebé esté despierto y [place]  Hammer bebé podría estar más activo en la tarde  · Realice el conteo de las patadas después de comer o moisés un refrigerio  Hammer bebé podría estar más activo después de que usted coma  Espere 2 horas después de beber líquidos que contengan cafeína   La cafeína puede hacer que hammer bebé esté más activo que lo normal     · Usted no debe fumar mientras está embarazada  El fumar aumenta el riesgo de problemas de jeronimo para usted y para hammer bebé eloisa el Ana Mangle  Si usted fuma, espere 2 horas para realizar el conteo de patadas  La nicotina puede hacer que hammer bebé sea más activo de lo usual   Cómo realizar el conteo de patadas:  Revise que hammer bebé esté despierto antes de realizar el conteo de patadas  Usted puede despertar a hammer bebé empujando hammer estómago suavemente, caminando o tomando algo frío  Hammer médico podría indicarle diferentes maneras de realizar el conteo  Él podría decirle que connie lo siguiente:  · Use gomez gráfica o un reloj para mantener un registro de la hora en que comienza y termina de contar  · Siéntese en gomez silla o acuéstese en hammer costado derecho  · Coloque safia mariangel en la parte más mike de hammer BJURHOLM  · Cuente hasta que llegue a las 10 patadas  Escriba cuánto tiempo le lleva contar las 10 patadas  · Podría moisés de 30 minutos a 2 horas para contar 10 patadas  No debería de moisés más de 2 horas para contar 10 patadas  · Si usted no siente las 10 patadas dentro de 2 horas, espere 1 hora y cuente de [place]  Hammer bebé puede dormir hasta por 40 minutos a la vez  Acuda a safia consultas de control con hammer médico según le indicaron  Anote safia preguntas para que se acuerde de hacerlas eloisa safia visitas  © 2017 [address] Information is for End User's use only and may not be sold, redistributed or otherwise used for commercial purposes  All illustrations and images included in CareNotes® are the copyrighted property of A D A M , Inc  or [facility]  Esta información es sólo para uso en educación  Hammer intención no es darle un consejo médico sobre enfermedades o tratamientos  Colsulte con hammer Verlon Susy farmacéutico antes de seguir cualquier régimen médico para saber si es seguro y efectivo para usted    El embarazo de la semana 35 a la 38   LO QUE NECESITA SABER:   Nelson Barboza están ocurriendo en mi cuerpo? Al principio de las 37 semanas se considera que usted está en término completo  Podría ser mas fácil que usted respire si hammer bebé se ha posicionado con la jarett hacia abajo  Es posible que usted necesite orinar con mayor frecuencia ya que el bebé podría estar presionando hammer vejiga  Usted también podría sentir más molestias y cansarse fácilmente  ¿Cómo me estrella cuidar en esta etapa de mi embarazo? · Consuma alimentos saludables y variados  Alimentos saludables incluyen frutas, verduras, panes de adair integral, alimentos lácteos bajos en grasa, frijoles, job magras y pescado  [place] líquidos cinthya se le haya indicado  Pregunte cuánto líquido debe moisés cada día y cuáles líquidos son los más adecuados para usted  Limite el consumo de cafeína a menos de [address]  Limite el consumo de pescado a 2 porciones cada semana  Escoja pescado con concentraciones bajas de trung cinthya atún al natural enlatado, camarón, salmón, bacalao o tilapia  No  coma pescado con concentraciones altas de trung cinthya pez charly, caballa gigante, pargo rayado y tiburón  · [id number] [address]  Hammer necesidad de ciertas vitaminas y [address], cinthya el ácido fólico, aumenta eloisa el [facility]  Las vitaminas prenatales proporcionan algunas de las vitaminas y minerales adicionales que usted necesita  Las vitaminas prenatales también podrían ayudar a disminuir el riesgo de ciertos defectos de nacimiento  · Descanse tanto cinthya sea necesario  [place]ante safia pies si tiene inflamación en safia tobillos y pies  · No fume  Si usted fuma, nunca es demasiado tarde para dejar de hacerlo  Fumar aumenta el riesgo de aborto espontáneo y otros problemas de jeronimo eloisa hammer [place]  Fumar puede causar que hammer bebé nazca antes de tiempo o que pese menos al nacer  Solicite información a hammer médico si usted necesita ayuda para dejar de fumar  · No consuma alcohol    El alcohol pasa de hammer cuerpo al bebé a través de la placenta  Puede afectar el desarrollo del cerebro de hammer bebé y provocar el síndrome de alcoholismo fetal (SAF)  FAS es un vanessa de condiciones que causan [address], de comportamiento y de crecimiento  · Consulte con hammer médico antes de moisés cualquier medicamento  Muchos medicamentos pueden perjudicar a hammer bebé si usted los eunice [facility] [address]  No tome ningún medicamento, vitaminas, hierbas o suplementos sin marilia consultar con hammer Janet Coop  use drogas ilegales o de la griggs (cinthya marihuana o cocaína) mientras está embarazada  · Hable con hammer médico antes de viajar  Es posible que no pueda viajar en avión después de las 36 [facility]  También le puede recomendar que evite largos viajes por carretera  ¿Cuáles son [place] Duglas consejos de seguridad eloisa el embarazo? · Evite jacuzzis y saunas  No use un jacuzzi o un sauna mientras usted está embarazada, especialmente eloisa el primer trimestre  Los [facility] y los saunas aumentan la temperatura de hammer bebé y el riesgo de defectos de nacimiento  · Evite la toxoplasmosis  [place] es gomez infección causada por comer carne cruda o estar cerca del excremento de un courtney infectado  Ma[place] puede causar malformaciones congénitas, aborto espontáneo y Farhan Schein  Lávese las mariangel después de tocar carne cruda  Asegúrese de que la carne esté evelyn cocida antes de comerla  Evite los huevos crudos y la [place] Lucernemines  Use guantes o pida que alguien la ayude a limpiar la caja de arena del courtney mientras usted Sukhi Cuevas  · Consulte con hammer médico acerca de viajar  El [address] cómodo para viajar es eloisa el yarely trimestre  Pregunte a hammer médico si usted puede viajar después de las 36 semanas  Es posible que no pueda viajar en avión después de las 36 [facility]  También le puede recomendar que evite largos viajes por carretera  ¿Qué cambios están ocurriendo con mi bebé?   Para las 38 semanas, hammer bebé podría pesar entre 6 y 5 libras  Hammer bebé podría medir alrededor de 14 pulgadas de leia desde la punta de la jarett hasta la rabadilla (parte inferior del bebé)  Hammer bebé escucha lo suficientemente evelyn cinthya para reconocer hammer voz  Conforme hammer bebé crece más, usted podría sentir menos patadas y sentir más que hammer bebé se estira y [place]  Hammer bebé podría moverse en posición con la jarett hacia abajo  Hammer bebé también descansará en la parte inferior de hammer abdomen  ¿Qué necesito saber acerca del cuidado prenatal?  Hammer médico le revisará hammer presión arterial y [place]  Es posible que también necesite lo siguiente:  · Un examen de orina  también podría realizarse para revisarle el azúcar y la proteína  Estas son señales de diabetes gestacional o de infección  La proteína en hammer orina también podría ser gomez señal de preeclampsia  La preeclampsia es gomez condición que puede desarrollarse eloisa la semana 21 o después en hammer embarazo  Esta provoca presión arterial manolo y [employer] con safia riñones y [place]  · Los análisis de jesica  se pueden realizar para revisar si tiene signos de anemia (nivel bajo del lata)  · La vacuna Tdap  podría ser recomendado por hammer médico      · El examen del estreptococo del vanessa B  es un examen que se realiza para verificar si hay infección por estreptococos del vanessa B  El estreptococo del vanessa B es un tipo de bacteria que se puede encontrar en la vagina o el recto  Podría ser transmitida a hammer bebé eloisa el parto si usted la tiene  Hammer médico podría miosés Phyllistine Antes de hammer vagina o recto y deepa la Mirlande Yamileth al laboratorio para que la examinen  · La altura uterina  es gomez medición del útero para controlar el desarrollo de hammer bebé  [employer] por lo general es igual al número de [address] que usted tiene de embarazo  Hammer médico también podría revisar la posición de hammer bebé  · El ritmo cardíaco de hammer bebé  será revisado  ¿Cuándo estrella buscar atención inmediata? · Usted presenta un mamie dolor de jarett que no desaparece  · Usted tiene cambios en la visión nuevos o en aumento, cinthya visión borrosa o con manchas  · Usted tiene inflamación nueva o creciente en branch cesar o mariangel  · Usted tiene manchado o sangrado vaginal     · Usted rompe domingo o siente un chorro o gotas de agua tibia que le está bajando por branch vagina  ¿Cuándo estrella comunicarme con mi médico?   · Usted tiene más de 5 contracciones en 1 hora  · Usted nota algún cambio en los movimientos de branch bebé  · Usted tiene calambres, presión o tensión abdominal     · Usted tiene un cambio en la secreción vaginal     · Usted tiene escalofríos o fiebre  · Usted tiene comezón, ardor o dolor vaginal      · Usted tiene gomez secreción vaginal amarillenta, verdosa, tanya o de [employer]  · Usted tiene dolor o ardor al Donalee Dace, orina menos de lo habitual o tiene [place] rosada o sanguinolenta  · Usted tiene preguntas o inquietudes acerca de branch condición o cuidado  ACUERDOS SOBRE BRANCH CUIDADO:   Usted tiene el derecho de ayudar a planear branch cuidado  Aprenda todo lo que pueda sobre branch condición y cinthya darle tratamiento  Discuta safia opciones de tratamiento con safia médicos para decidir el cuidado que usted desea recibir  Usted siempre tiene el derecho de rechazar el tratamiento  Esta información es sólo para uso en educación  Branch intención no es darle un consejo médico sobre enfermedades o tratamientos  Colsulte con branch Freeda Causey farmacéutico antes de seguir cualquier régimen médico para saber si es seguro y efectivo para usted  © 2017 [address] Information is for End User's use only and may not be sold, redistributed or otherwise used for commercial purposes  All illustrations and images included in CareNotes® are the copyrighted property of A D A M , Inc  or Logan Meyer    Cesárea   CUIDADO AMBULATORIO:   Lo que necesita saber acerca de gomez cesárea:  Kaylan [place] o parto por cesárea es gomez cirugía abdominal que se realiza para extraer a marks bebé  Cómo prepararse para gomez cesárea: Marks médico le explicará cómo debe prepararse para la [facility]  Le puede indicar que no consuma ningún alimento ni bebida después de la medianoche del día de la [facility]  [place] Better qué medicamentos puede moisés el día de la [facility]  Podrían administrarle un antibiótico por vía intravenosa para evitar que contraiga gomez infección bacteriana  Qué sucederá eloisa gomez cesárea:  Por lo general le administrarán la anestesia raquídea para adormecerla del área de la cirugía [place] [address]  Usted podría sentir presión o molestia eloisa la cesárea, kevin no debería sentir ningún dolor  Marks médico usualmente realizará gomez incisión a lo leia de la parte inferior de marks abdomen  Él sacará cuidadosamente a marks bebé  La incisión será cerrada con suturas o grapas y sera cubierta con un vendaje  Qué sucederá después de gomez cesárea:  A usted la Floyd Parents a un cuarto a descansar alrededor de gomez hora después del parto  A usted le pondrán un catéter de Lopez para drenar marks orina y [employer] pondrán gomez vía Sammy  No  se levante de la cama hasta que marks médico lo autorice  Cuáles son los riesgos de Jessica Flurry cesárea:  Es posible que usted jesica más de lo esperado o que desarrolle gomez infección  Marks vejiga o intestinos podrían sufrir daños eloisa el procedimiento  Se le podría formar un coágulo sanguíneo en la pierna  [place] podría poner en peligro marks jp  Llame al 911 en maria e de presentar lo siguiente:   · Usted se siente mareada, le hace falta el aire y tiene dolor de [place]  · Usted expectora jesica  Busque atención médica de inmediato si:   · La jesica empapa el vendaje  · Se desprenden los puntos de sutura  · Marks brazo o pierna se siente caliente, sensible y [place]  Se podría vinay inflamado y caba    Comuníquese con marks obstetra si:   · Usted presenta sangrado vaginal que empapa 1 toalla higiénica o más en 1 hora     · Usted tiene fiebre  · Hammer incisión está inflamada, enrojecida o drena pus  · Usted tiene preguntas o inquietudes acerca de usted o de hammer bebé  Medicamentos:  Es posible que usted necesite alguno de los siguientes:  · Un medicamento con receta para el dolor  podrían ser Magalie Ke  Pregunte cómo moisés estos medicamentos de gomez forma sears  · El acetaminofén  Kissousa el dolor y baja la fiebre  Está disponible sin receta médica  Pregunte la cantidad y la frecuencia con que debe tomarlos  [address]  El acetaminofén puede causar daño en el hígado cuando no se eunice de forma correcta  · AINEs (Analgésicos antiinflamatorios no esteroides) cinthya el ibuprofeno, ayudan a disminuir la inflamación, el dolor y la [place]  Los AINEs pueden causar sangrado estomacal o problemas renales en ciertas personas  Si usted eunice un medicamento anticoagulante, siempre pregúntele a hammer médico si los ANANYA son seguros para usted  Siempre radha la etiqueta de trinity medicamento y [place] instrucciones  Cuidado de la herida:  Reuben Shelli cuidadosamente hammer herida con Lorrane Genre y agua cada día  Mantenga la herida limpia y seca  Use ropa suelta y cómoda que no le roce hammer herida  Pregunte a hammer ginecólogo acerca de bañarse y ducharse  Limite safia actividades según le indicaron:   · Pregunte cuándo es seguro para que usted [place], suba las escaleras, levante objetos pesados y tenga relaciones sexuales  · Pregunte cuándo podrá hacer ejercicio y qué tipos de ejercicio hacer  Comience lentamente y connie más conforme esté más mamie  [place] líquidos según safia indicaciones:  Los líquidos la ayudan a mantenerse hidratada después de hammer procedimiento y disminuye hammer riesgo de un coágulo de jesica  Pregunte cuánto líquido debe moisés cada día y cuáles líquidos son los más adecuados para usted     Programe gomez adolfo con hammer ginecólogo cinthya se le indique:  Es posible que usted necesite regresar para que le quiten los puntos de sutura o las grapas  Anote safia preguntas para que se acuerde de hacerlas eloisa safia visitas  © 2017 [facility] Information is for End User's use only and may not be sold, redistributed or otherwise used for commercial purposes  All illustrations and images included in CareNotes® are the copyrighted property of A D A M  Inc  or Logan Meyer  Esta información es sólo para uso en educación  Hammer intención no es darle un consejo médico sobre enfermedades o tratamientos  Colsulte con hammer Chandrika Angst farmacéutico antes de seguir cualquier régimen médico para saber si es seguro y efectivo para usted

## 2018-03-15 NOTE — PROGRESS NOTES
Alexis Zayas,  Certified  used to translate at today's encounter  Patient denies loss of fluid, vaginal bleeding and abdominal pain  Confirms frequent fetal movement  Is not doing fetal kick counts daily  Tolerating prenatal vitamin well  Has not followed up with  Center for a 32 week growth scan for for  fetal testing for indication of morbid obesity   center called today to schedule patient either this afternoon or tomorrow afternoon as this is when patient is most agreeable to appointment  PLAN:  1  Fetal kick counts reviewed, encouraged daily and written information provided  2   Center  Scheduled for 2018 at 2:00 p m   3   Showing instructions and chlorhexidine soap provided to patient  Repeat  is scheduled for   4    Precautions reviewed  RTO 1 5 weeks for incision check

## 2018-03-16 ENCOUNTER — ULTRASOUND (OUTPATIENT)
Dept: PERINATAL CARE | Facility: CLINIC | Age: 28
End: 2018-03-16
Payer: COMMERCIAL

## 2018-03-16 VITALS
HEIGHT: 64 IN | DIASTOLIC BLOOD PRESSURE: 65 MMHG | HEART RATE: 94 BPM | WEIGHT: 226.8 LBS | SYSTOLIC BLOOD PRESSURE: 127 MMHG | BODY MASS INDEX: 38.72 KG/M2

## 2018-03-16 DIAGNOSIS — Z3A.38 38 WEEKS GESTATION OF PREGNANCY: ICD-10-CM

## 2018-03-16 DIAGNOSIS — O34.219 PREVIOUS CESAREAN SECTION COMPLICATING PREGNANCY: Primary | ICD-10-CM

## 2018-03-16 DIAGNOSIS — Z03.72 SUSPECTED PROBLEM WITH PLACENTA NOT FOUND: ICD-10-CM

## 2018-03-16 PROBLEM — O44.40 LOW LYING PLACENTA, ANTEPARTUM: Status: RESOLVED | Noted: 2018-01-24 | Resolved: 2018-03-16

## 2018-03-16 PROCEDURE — 76817 TRANSVAGINAL US OBSTETRIC: CPT | Performed by: OBSTETRICS & GYNECOLOGY

## 2018-03-16 PROCEDURE — 76816 OB US FOLLOW-UP PER FETUS: CPT | Performed by: OBSTETRICS & GYNECOLOGY

## 2018-03-18 ENCOUNTER — ANESTHESIA EVENT (INPATIENT)
Dept: LABOR AND DELIVERY | Facility: HOSPITAL | Age: 28
End: 2018-03-18
Payer: COMMERCIAL

## 2018-03-19 ENCOUNTER — ANESTHESIA (INPATIENT)
Dept: LABOR AND DELIVERY | Facility: HOSPITAL | Age: 28
End: 2018-03-19
Payer: COMMERCIAL

## 2018-03-19 ENCOUNTER — HOSPITAL ENCOUNTER (INPATIENT)
Facility: HOSPITAL | Age: 28
LOS: 3 days | Discharge: HOME | End: 2018-03-22
Attending: OBSTETRICS & GYNECOLOGY | Admitting: OBSTETRICS & GYNECOLOGY
Payer: COMMERCIAL

## 2018-03-19 DIAGNOSIS — Z3A.39 39 WEEKS GESTATION OF PREGNANCY: ICD-10-CM

## 2018-03-19 DIAGNOSIS — Z98.891 STATUS POST REPEAT LOW TRANSVERSE CESAREAN SECTION: Primary | ICD-10-CM

## 2018-03-19 DIAGNOSIS — O34.219 PREVIOUS CESAREAN SECTION COMPLICATING PREGNANCY: ICD-10-CM

## 2018-03-19 PROBLEM — Z34.93 PRENATAL CARE IN THIRD TRIMESTER: Status: RESOLVED | Noted: 2018-02-14 | Resolved: 2018-03-19

## 2018-03-19 LAB
ABO GROUP BLD: NORMAL
BASE EXCESS BLDCOA CALC-SCNC: -1.8 MMOL/L (ref 3–11)
BASE EXCESS BLDCOV CALC-SCNC: -3.8 MMOL/L (ref 1–9)
BASOPHILS # BLD MANUAL: 0 THOUSAND/UL (ref 0–0.1)
BASOPHILS NFR MAR MANUAL: 0 % (ref 0–1)
BLD GP AB SCN SERPL QL: NEGATIVE
EOSINOPHIL # BLD MANUAL: 0 THOUSAND/UL (ref 0–0.4)
EOSINOPHIL NFR BLD MANUAL: 0 % (ref 0–6)
ERYTHROCYTE [DISTWIDTH] IN BLOOD BY AUTOMATED COUNT: 14.2 % (ref 11.6–15.1)
HCO3 BLDCOA-SCNC: 24.2 MMOL/L (ref 17.3–27.3)
HCO3 BLDCOV-SCNC: 20.6 MMOL/L (ref 12.2–28.6)
HCT VFR BLD AUTO: 46.7 % (ref 34.8–46.1)
HGB BLD-MCNC: 14.8 G/DL (ref 11.5–15.4)
LYMPHOCYTES # BLD AUTO: 0.41 THOUSAND/UL (ref 0.6–4.47)
LYMPHOCYTES # BLD AUTO: 10 % (ref 14–44)
MCH RBC QN AUTO: 28.4 PG (ref 26.8–34.3)
MCHC RBC AUTO-ENTMCNC: 31.7 G/DL (ref 31.4–37.4)
MCV RBC AUTO: 90 FL (ref 82–98)
MONOCYTES # BLD AUTO: 0.2 THOUSAND/UL (ref 0–1.22)
MONOCYTES NFR BLD: 5 % (ref 4–12)
NEUTROPHILS # BLD MANUAL: 3.46 THOUSAND/UL (ref 1.85–7.62)
NEUTS SEG NFR BLD AUTO: 85 % (ref 43–75)
NRBC BLD AUTO-RTO: 0 /100 WBCS
O2 CT VFR BLDCOA CALC: 7.6 ML/DL
OXYHGB MFR BLDCOA: 35.6 %
OXYHGB MFR BLDCOV: 84.2 %
PCO2 BLDCOA: 45.2 MM[HG] (ref 30–60)
PCO2 BLDCOV: 36 MM HG (ref 27–43)
PH BLDCOA: 7.35 [PH] (ref 7.23–7.43)
PH BLDCOV: 7.38 [PH] (ref 7.19–7.49)
PLATELET # BLD AUTO: 161 THOUSANDS/UL (ref 149–390)
PLATELET BLD QL SMEAR: ADEQUATE
PMV BLD AUTO: 11.6 FL (ref 8.9–12.7)
PO2 BLDCOA: 18 MM HG (ref 5–25)
PO2 BLDCOV: 41.3 MM HG (ref 15–45)
RBC # BLD AUTO: 5.21 MILLION/UL (ref 3.81–5.12)
RBC MORPH BLD: NORMAL
RH BLD: POSITIVE
SAO2 % BLDCOV: 18.2 ML/DL
SPECIMEN EXPIRATION DATE: NORMAL
WBC # BLD AUTO: 4.07 THOUSAND/UL (ref 4.31–10.16)

## 2018-03-19 PROCEDURE — 82805 BLOOD GASES W/O2 SATURATION: CPT | Performed by: OBSTETRICS & GYNECOLOGY

## 2018-03-19 PROCEDURE — 59515 CESAREAN DELIVERY: CPT | Performed by: OBSTETRICS & GYNECOLOGY

## 2018-03-19 PROCEDURE — 86592 SYPHILIS TEST NON-TREP QUAL: CPT | Performed by: OBSTETRICS & GYNECOLOGY

## 2018-03-19 PROCEDURE — 86900 BLOOD TYPING SEROLOGIC ABO: CPT | Performed by: OBSTETRICS & GYNECOLOGY

## 2018-03-19 PROCEDURE — 86901 BLOOD TYPING SEROLOGIC RH(D): CPT | Performed by: OBSTETRICS & GYNECOLOGY

## 2018-03-19 PROCEDURE — 85027 COMPLETE CBC AUTOMATED: CPT | Performed by: OBSTETRICS & GYNECOLOGY

## 2018-03-19 PROCEDURE — 86850 RBC ANTIBODY SCREEN: CPT | Performed by: OBSTETRICS & GYNECOLOGY

## 2018-03-19 PROCEDURE — 85007 BL SMEAR W/DIFF WBC COUNT: CPT | Performed by: OBSTETRICS & GYNECOLOGY

## 2018-03-19 RX ORDER — DOCUSATE SODIUM 100 MG/1
100 CAPSULE, LIQUID FILLED ORAL 2 TIMES DAILY
Status: DISCONTINUED | OUTPATIENT
Start: 2018-03-19 | End: 2018-03-22 | Stop reason: HOSPADM

## 2018-03-19 RX ORDER — IBUPROFEN 600 MG/1
600 TABLET ORAL EVERY 6 HOURS PRN
Status: DISCONTINUED | OUTPATIENT
Start: 2018-03-19 | End: 2018-03-22 | Stop reason: HOSPADM

## 2018-03-19 RX ORDER — OXYCODONE HYDROCHLORIDE AND ACETAMINOPHEN 5; 325 MG/1; MG/1
2 TABLET ORAL EVERY 4 HOURS PRN
Status: ACTIVE | OUTPATIENT
Start: 2018-03-19 | End: 2018-03-20

## 2018-03-19 RX ORDER — ACETAMINOPHEN 325 MG/1
650 TABLET ORAL EVERY 6 HOURS PRN
Status: DISCONTINUED | OUTPATIENT
Start: 2018-03-19 | End: 2018-03-22 | Stop reason: HOSPADM

## 2018-03-19 RX ORDER — OXYCODONE HYDROCHLORIDE AND ACETAMINOPHEN 5; 325 MG/1; MG/1
2 TABLET ORAL EVERY 4 HOURS PRN
Status: DISCONTINUED | OUTPATIENT
Start: 2018-03-20 | End: 2018-03-22 | Stop reason: HOSPADM

## 2018-03-19 RX ORDER — SODIUM CHLORIDE, SODIUM LACTATE, POTASSIUM CHLORIDE, CALCIUM CHLORIDE 600; 310; 30; 20 MG/100ML; MG/100ML; MG/100ML; MG/100ML
125 INJECTION, SOLUTION INTRAVENOUS CONTINUOUS
Status: DISCONTINUED | OUTPATIENT
Start: 2018-03-19 | End: 2018-03-22 | Stop reason: HOSPADM

## 2018-03-19 RX ORDER — KETOROLAC TROMETHAMINE 30 MG/ML
INJECTION, SOLUTION INTRAMUSCULAR; INTRAVENOUS AS NEEDED
Status: DISCONTINUED | OUTPATIENT
Start: 2018-03-19 | End: 2018-03-19 | Stop reason: SURG

## 2018-03-19 RX ORDER — OXYCODONE HYDROCHLORIDE AND ACETAMINOPHEN 5; 325 MG/1; MG/1
1 TABLET ORAL EVERY 4 HOURS PRN
Status: DISCONTINUED | OUTPATIENT
Start: 2018-03-20 | End: 2018-03-22 | Stop reason: HOSPADM

## 2018-03-19 RX ORDER — ONDANSETRON 2 MG/ML
4 INJECTION INTRAMUSCULAR; INTRAVENOUS EVERY 8 HOURS PRN
Status: DISCONTINUED | OUTPATIENT
Start: 2018-03-19 | End: 2018-03-19

## 2018-03-19 RX ORDER — ONDANSETRON 2 MG/ML
4 INJECTION INTRAMUSCULAR; INTRAVENOUS EVERY 4 HOURS PRN
Status: ACTIVE | OUTPATIENT
Start: 2018-03-19 | End: 2018-03-20

## 2018-03-19 RX ORDER — FENTANYL CITRATE/PF 50 MCG/ML
50 SYRINGE (ML) INJECTION
Status: DISCONTINUED | OUTPATIENT
Start: 2018-03-19 | End: 2018-03-22 | Stop reason: HOSPADM

## 2018-03-19 RX ORDER — DIPHENHYDRAMINE HYDROCHLORIDE 50 MG/ML
25 INJECTION INTRAMUSCULAR; INTRAVENOUS EVERY 6 HOURS PRN
Status: DISCONTINUED | OUTPATIENT
Start: 2018-03-19 | End: 2018-03-22 | Stop reason: HOSPADM

## 2018-03-19 RX ORDER — METOCLOPRAMIDE HYDROCHLORIDE 5 MG/ML
5 INJECTION INTRAMUSCULAR; INTRAVENOUS EVERY 6 HOURS PRN
Status: ACTIVE | OUTPATIENT
Start: 2018-03-19 | End: 2018-03-20

## 2018-03-19 RX ORDER — NALBUPHINE HCL 10 MG/ML
5 AMPUL (ML) INJECTION
Status: DISPENSED | OUTPATIENT
Start: 2018-03-19 | End: 2018-03-20

## 2018-03-19 RX ORDER — KETOROLAC TROMETHAMINE 30 MG/ML
30 INJECTION, SOLUTION INTRAMUSCULAR; INTRAVENOUS EVERY 6 HOURS SCHEDULED
Status: COMPLETED | OUTPATIENT
Start: 2018-03-19 | End: 2018-03-20

## 2018-03-19 RX ORDER — SIMETHICONE 80 MG
80 TABLET,CHEWABLE ORAL 4 TIMES DAILY PRN
Status: DISCONTINUED | OUTPATIENT
Start: 2018-03-19 | End: 2018-03-22 | Stop reason: HOSPADM

## 2018-03-19 RX ORDER — SODIUM CHLORIDE, SODIUM LACTATE, POTASSIUM CHLORIDE, CALCIUM CHLORIDE 600; 310; 30; 20 MG/100ML; MG/100ML; MG/100ML; MG/100ML
125 INJECTION, SOLUTION INTRAVENOUS CONTINUOUS
Status: DISCONTINUED | OUTPATIENT
Start: 2018-03-19 | End: 2018-03-19

## 2018-03-19 RX ORDER — BUPIVACAINE HYDROCHLORIDE 7.5 MG/ML
INJECTION, SOLUTION INTRASPINAL AS NEEDED
Status: DISCONTINUED | OUTPATIENT
Start: 2018-03-19 | End: 2018-03-19 | Stop reason: SURG

## 2018-03-19 RX ORDER — ONDANSETRON 2 MG/ML
4 INJECTION INTRAMUSCULAR; INTRAVENOUS EVERY 8 HOURS PRN
Status: DISCONTINUED | OUTPATIENT
Start: 2018-03-19 | End: 2018-03-22 | Stop reason: HOSPADM

## 2018-03-19 RX ORDER — ONDANSETRON 2 MG/ML
4 INJECTION INTRAMUSCULAR; INTRAVENOUS ONCE AS NEEDED
Status: DISCONTINUED | OUTPATIENT
Start: 2018-03-19 | End: 2018-03-22 | Stop reason: HOSPADM

## 2018-03-19 RX ORDER — DEXAMETHASONE SODIUM PHOSPHATE 4 MG/ML
8 INJECTION, SOLUTION INTRA-ARTICULAR; INTRALESIONAL; INTRAMUSCULAR; INTRAVENOUS; SOFT TISSUE ONCE AS NEEDED
Status: ACTIVE | OUTPATIENT
Start: 2018-03-19 | End: 2018-03-20

## 2018-03-19 RX ORDER — MORPHINE SULFATE 0.5 MG/ML
INJECTION, SOLUTION EPIDURAL; INTRATHECAL; INTRAVENOUS AS NEEDED
Status: DISCONTINUED | OUTPATIENT
Start: 2018-03-19 | End: 2018-03-19 | Stop reason: SURG

## 2018-03-19 RX ADMIN — SODIUM CHLORIDE, SODIUM LACTATE, POTASSIUM CHLORIDE, AND CALCIUM CHLORIDE 125 ML/HR: .6; .31; .03; .02 INJECTION, SOLUTION INTRAVENOUS at 13:17

## 2018-03-19 RX ADMIN — SODIUM CHLORIDE, SODIUM LACTATE, POTASSIUM CHLORIDE, AND CALCIUM CHLORIDE 125 ML/HR: .6; .31; .03; .02 INJECTION, SOLUTION INTRAVENOUS at 12:26

## 2018-03-19 RX ADMIN — KETOROLAC TROMETHAMINE 30 MG: 30 INJECTION, SOLUTION INTRAMUSCULAR at 23:40

## 2018-03-19 RX ADMIN — SODIUM CHLORIDE, SODIUM LACTATE, POTASSIUM CHLORIDE, AND CALCIUM CHLORIDE: .6; .31; .03; .02 INJECTION, SOLUTION INTRAVENOUS at 16:35

## 2018-03-19 RX ADMIN — BUPIVACAINE HYDROCHLORIDE IN DEXTROSE 1.6 ML: 7.5 INJECTION, SOLUTION SUBARACHNOID at 16:44

## 2018-03-19 RX ADMIN — CEFAZOLIN SODIUM 2000 MG: 2 SOLUTION INTRAVENOUS at 16:33

## 2018-03-19 RX ADMIN — PHENYLEPHRINE HYDROCHLORIDE 50 MCG/MIN: 10 INJECTION INTRAVENOUS at 16:46

## 2018-03-19 RX ADMIN — SODIUM CHLORIDE, SODIUM LACTATE, POTASSIUM CHLORIDE, AND CALCIUM CHLORIDE 125 ML/HR: .6; .31; .03; .02 INJECTION, SOLUTION INTRAVENOUS at 22:09

## 2018-03-19 RX ADMIN — OXYTOCIN 250 MILLI-UNITS/MIN: 10 INJECTION, SOLUTION INTRAMUSCULAR; INTRAVENOUS at 17:05

## 2018-03-19 RX ADMIN — KETOROLAC TROMETHAMINE 30 MG: 30 INJECTION, SOLUTION INTRAMUSCULAR at 17:23

## 2018-03-19 RX ADMIN — MORPHINE SULFATE 0.25 MG: 0.5 INJECTION, SOLUTION EPIDURAL; INTRATHECAL; INTRAVENOUS at 16:44

## 2018-03-19 RX ADMIN — SODIUM CHLORIDE, SODIUM LACTATE, POTASSIUM CHLORIDE, AND CALCIUM CHLORIDE: .6; .31; .03; .02 INJECTION, SOLUTION INTRAVENOUS at 17:24

## 2018-03-19 NOTE — OP NOTE
OPERATIVE REPORT  PATIENT NAME: Apollo Trinidad    :  1990  MRN: [id number]  Pt Location: [facility] L&D OR ROOM 02    SURGERY DATE: 3/19/2018    Surgeon(s) and Role:     * Tamika Solo MD - Primary     * Annmarie Aguilar MD - Assisting     * Tone Lora MD - Assisting    Preop Diagnosis:  Previous  section complicating pregnancy [M33 768]  Pregnancy at 36w3d  Maternal morbid obesity  Vitamin D deficiency    Post-Op Diagnosis Codes:  Same as above, delivered    Procedure(s) (LRB):   SECTION () REPEAT (N/A)    Specimen(s):  ID Type Source Tests Collected by Time Destination   A :  Blood, Arterial Cord BLOOD GAS, ARTERIAL, CORD Tamika Solo MD 3/19/2018 1707    B :  Cord Blood Cord BLOOD GAS, VENOUS, CORD Tamika Solo MD 3/19/2018 1707        Estimated Blood Loss:   800 mL    IVF:  1400cc (LR)    Drains:  None     Anesthesia Type:   Spinal     Operative Indications:  Previous  section complicating pregnancy [Z21 787]  Pregnancy at 39w1d    Findings:  1  Delivery of viable female on 18 at 1704, weight 7lbs 0oz;  Apgar scores of 9 at one minute and 9 at five minutes  2  Normal appearing placenta with centrally-inserted 3 vessel cord  3  Clear amniotic fluid  4  Mild adhesive disease of the rectus fascia to the underlying rectus muscle ; Mild adhesive disease of the vesicouterine peritoneum to the anterior uterus  4  Otherwise grossly normal uterus, tubes, and ovaries    Procedure Details  Decision was made to proceed with  section due to scheduled repeat  section at 39w1d, history of 2 prior  sections  Patient was made aware of these findings and the proposed plan  Risks, benefits, possible complications, alternate treatment options, and expected outcomes were discussed with the patient  The patient agreed with the proposed plan and gave informed consent        The patient was taken to the operating room where she was properly identified to the OR staff and attending physician, Dr Evette Chapa  She received spinal anesthesia in the operating room  Fetal heart tones were appreciated and found to be appropriate  The vagina was prepped with Betadine, a Lopez catheter was aseptically inserted and SCDs were placed  The abdomen was prepped with Chloraprep and following appropriate drying time, the patient was draped in the usual sterile manner for a Pfannenstiel incision  The patient had received Ancef 2g IV pre-operatively for prophylaxis  A Time Out was held and the above information confirmed  The patient was identified as Alma Pradhan and the procedure verified as repeat  Delivery  A Pfannenstiel incision was made along her prior  scar and carried down through the underlying subcutaneous tissue to the fascia using a scalpel  Rectus fascia was then incised in the midline and extended laterally using Go scissors  The superior edge of the fascia was grasped with Kocher clamps, tented upward, and the underlying muscle was bluntly dissected off  The inferior edge was grasped with Kocher clamps and cleared using blunt dissection & the Go scissors  There was mild adhesive disease of the rectus fascia to the underlying muscle  The rectus muscles were  and the peritoneum was identified and subsequently entered and extended longitudinally with blunt dissection  There was noted to be mild adhesions of the vesicouterine peritoneum to the anterior uterine wall however the bladder was noted to be safely out of the surgical field  The bladder blade was inserted  A low transverse uterine incision was made with the scalpel and extended cephalocaudally with blunt dissection  The amnion was entered bluntly and the fluid was noted to be clear  Surgeons hand was inserted through the hysterotomy and the fetal head was palpated, elevated, and delivered through the uterine incision with the assistance of fundal pressure  Baby had spontaneous cry with good color and tone  The umbilical cord was clamped and cut  The infant was handed off to the  providers  Arterial and venous cord gases, cord blood, and a segment of umbilical cord were obtained for evaluation and promptly sent to the lab  The placenta delivered spontaneously with uterine fundal massage and was noted to have a centrally inserted 3 vessel cord  This was also sent to the lab for placental pathology  The uterus was exteriorized and a moist lap sponge was used to clear the cavity of clots and products of conception  The uterine incision was closed with a running locked suture of 0 Vicryl  A second layer of the same suture was used to imbricate the first   Good hemostasis was confirmed upon uterine closure  Warmed normal saline solution was used to irrigate the posterior culdesac and the uterus was returned to the abdomen  The paracolic gutters were inspected and cleared of all clots and debris with irrigation and moist lap sponges  The fascia was closed with a running suture of 0 Vicryl  Subcutaneous tissues were closed with 3-0 Plain gut suture  The skin was closed with 4-0 Vicryl in a subcuticular fashion and Histoacryl was applied to the incision  At the conclusion of the procedure, all needle, sponge, and instrument counts were noted to be correct x2  The patient tolerated the procedure well and was transferred to her the recovery room in stable condition  Dr Zak Hansen was present and participated in all key portions of the case      APGARS: 9 at one minute, 9 at 5 minutes  Blood gases: Arterial pH 7 346, Base excess -1 8 ; Venous pH 7 376, Base excess -3 8    Complications:   None    Patient Disposition:  PACU     SIGNATURE: Toy Ayoub MD  DATE: 2018  TIME: 5:57 PM

## 2018-03-19 NOTE — ANESTHESIA POSTPROCEDURE EVALUATION
Post-Op Assessment Note      CV Status:  Stable    Mental Status:  Alert    Hydration Status:  Stable    PONV Controlled:  None    Airway Patency:  Patent    Post Op Vitals Reviewed: Yes          Staff: Anesthesiologist           BP      Temp      Pulse    Resp      SpO2

## 2018-03-19 NOTE — DISCHARGE INSTRUCTIONS
Cesárea   LO QUE USTED DEBE SABER:   Un parto por cesárea es gomez cirugía abdominal que se realiza para extraer a hammer bebé  Existen muchas razones por las que usted podría necesitar gomez cesárea  · Clarene Leader cesárea podría programarse antes del parto si usted tuvo otra cesárea con hammer último bebé  Ésta podría programarse si hammer bebé no está en gomez posición normal, o si usted está embarazada con más de 1 bebé  · Hammer médico podría realizar gomez cesárea de emergencia eloisa el parto para evitar complicaciones que pongan en riesgo hmamer jp o la del bebé  Gomez cesárea podría realizarse si hammer cerviz no se dilata después de varias horas de [place]  · Otras razones para realizar gomez cesárea incluyen infecciones maternales o con la placenta  DESPUÉS DE SER DADO DE TAE:   Medicamentos:   · Podrían recetarle medicamentos para el dolor  Pregunte cómo tomarse trinity medicamento de [employer]  · El acetaminofeno  disminuye el dolor y la [place]  Está disponible sin receta médica  Pregunte la cantidad que debe moisés y con que frecuencia  [address]  El acetaminofeno puede provocar daño al hígado si no se eunice correctamente  · Los AINEs  ayudan a disminuir la inflamación y el dolor o la fiebre  Trinity medicamento está disponible con o sin receta médica  Los AINEs puede provocar sangrado estomacal o problemas del Naresh Schooner en ciertas personas  Si usted eunice medicamentos anticoagulantes siempre  consulte con hammer ginecólogo si los AINEs son seguros para usted  Siempre radha la etiqueta del medicamento y [place] instrucciones  · [place] hammer medicamento cinthya se le indique  Comuníquese con hammer ginecólogo si usted piensa que hammer medicamento no lo está ayudando o si tiene efectos secundarios  Infórmele si usted es alérgico a cualquier medicamento  Mantenga gomez lista de los medicamentos, vitaminas y hierbas que eunice  [address] cantidades, así cinthya cuándo y por qué los eunice   Coleen Shed con usted la lista o los envases de los medicamentos a randa citas de seguimiento  Lleve consigo la lista del medicamento en maria e de gomez emergencia  Programe gomez adolfo con hammer ginecólogo cinthya se le indique:  Es posible que usted necesite regresar para que le quiten los puntos de sutura o las grapas  Escriba las preguntas que tenga para que recuerde hacerlas eloisa randa citas  Cuidado de la herida:  Elle Jose cuidadosamente hammer herida con [place] y agua diariamente  Mantenga hammer Merlynn Zayda y seca  Use ropa suelta y cómoda que no roce la herida  Pregunte a hammer ginecólogo acerca de bañarse o ducharse  Ingiera suficientes líquidos:  Usted puede disminuir hammer riesgo de un coágulo de jesica si pamela suficientes líquidos  Pregunte cuánto líquido debe ingerir cada día y cuáles son los mejores para usted  Limite la actividad hasta que usted se recupere completamente de la cirugía:   · Pregunte cuando estará usted hábil para manejar, subir escaleras, levantar objetos pesados y tener relaciones sexuales  · Pregunte cuando es apropiado que ejercite, y qué tipos de ejercicios puede realizar  Comience lentamente y connie más conforme se sienta más mamie  Comuníquese con hammer ginecólogo si:   · Usted presenta sangrado vaginal que empapa 1 toalla higiénica o más en 1 hora  · Usted tiene fiebre  · Hammer incisión está inflamada, enrojecida o drena pus  · Usted tiene preguntas o inquietudes acerca de usted o de hammer bebé  Busque atención médica inmediatamente o llame al 911 si:   · La jesica empapa hammer vendaje  · Randa puntos de sutura se desprenden  · Usted se siente mareado, con falta de aliento y tiene dolor en el pecho  · Usted tose jesica  · Hammer brazo o pierna se sienten calientes, sensibles, y dolorosos  Se podría vinay inflamado y caba  · Usted tiene sangrado vaginal abundante (usted empapa gomez toalla sanitaria en 1 a 2 horas consecutivas)    © 2014 [address] is for End User's use only and may not be sold, redistributed or otherwise used for commercial purposes  All illustrations and images included in CareNotes® are the copyrighted property of A D A M , Inc  or Logan Meyer  Esta información es sólo para uso en educación  Marks intención no es darle un consejo médico sobre enfermedades o tratamientos  Colsulte con marks Maty Antis farmacéutico antes de seguir cualquier régimen médico para saber si es seguro y efectivo para usted  Sangrado posparto   LO QUE NECESITA SABER:   El sangrado posparto es un sangrado vaginal después de blas a radha al bebé  Sherri sangrado es normal tanto si tuvo un parto vaginal cinthya si se trata de gomez cesárea  Se compone de [place] y del tecorinnado que recubrían el interior de marks útero cuando estaba Will Major  Hari Nares EL TAE HOSPITALARIA:   Qué esperar cuando hay sangrado posparto:  El sangrado posparto usualmente tiene Jess duración de 10 cassius y podría durar hasta 6 semanas  Marks sangrado puede variar de leve (bindu no jose gomez toalla higiénica) a ser profuso (satura gomez toalla higiénica en 1 hora)  Usualmente, usted tiene un sangrado profuso rosalio después del parto, el cual disminuye en las próximas de semanas hasta que cesa completamente  El sangrado es caba o argueta oscuro con coágulos por los primeros 1 a 3 días  Después se torna [place] por varias semanas y luego se vuelve un flujo gore o amarillo hasta que cesa por completo  Programe gomez adolfo con marks obstétrico cinthya se le indique:  No tenga relaciones sexuales hasta que marks médico lo autorice  Anote safia preguntas para que se acuerde de hacerlas eloisa safia visitas  Comuníquese con marks médico o obstreta si:   · El [place] o usted tiene gomez hemorragia que satura gomez toalla higiénica en 1 hora por 2 horas seguidas  · Le salen grandes coágulos de [place]      · Usted está respirando más rápido que lo acostumbrado o marks corazón late más rápido que lo habitual     · Usted está orinando menos que de costumbre, o nada en lo absoluto  · Usted se siente mareado  · Usted tiene preguntas o inquietudes acerca de marks condición o cuidado  Busque atención médica de inmediato o llame al 911 si:   · A usted de repente le hace falta el aire y se siente desvanecer  · Tiene dolor repentino en el pecho  © 2017 [address] is for End User's use only and may not be sold, redistributed or otherwise used for commercial purposes  All illustrations and images included in CareNotes® are the copyrighted property of A D A M , Inc  or Logan Meyer  Esta información es sólo para uso en educación  Marks intención no es darle un consejo médico sobre enfermedades o tratamientos  Colsulte con marks Tamara Jewels farmacéutico antes de seguir cualquier régimen médico para saber si es seguro y efectivo para usted  Cuidado de los senos para las madres lactantes   LO QUE USTED DEBE SABER:   Randa senos experimentarán ciertos cambios normales mientras amamanta a marks bebé  A veces surgen problemas con los senos y los pezones eloisa la lactancia  Entérese de qué cambios son normales y cuáles podrían ser problemáticos  El cuidado de randa senos puede ayudarle a evitar y mantener bajo control ciertos problemas, de modo que tanto usted cinthya marks bebé disfruten de los beneficios de la lactancia materna  DESPUÉS DE SER DADO DE TAE:   Cambios en los senos eloisa la lactancia materna:   · Eloisa los primeros días posteriores al nacimiento del bebé, marks cuerpo producirá gomez pequeña cantidad de [place] materna, llamada calostro  Marks cuerpo comenzará a producir la [employer] dentro de 2 a 5 días  Es posible que la leche madura tarde hasta 10 días en bajar  Cuando baje la [employer], randa senos se pondrán llenos y firmes  Es posible que estén un poco doloridos  · Randa senos dejarán de sentirse llenos cuando amamante al bebé  Es posible que sienta un hormigueo cuando sale la leche de los senos    Esta sensación se conoce cinthya el reflejo de bajada de [place]  Pasados 7 o más días, es posible que no sienta los senos tan llenos  Safia pezones se deberían de vinay iguales que antes de comenzar a amamantar  Es buena señal si siente los senos llenos antes de amamantar y vacíos después de alimentar al bebé  Problemas que puede tener con los senos mientras esta lactando:   · Dolor en los pezones  puede ocurrir cuando comience a amamantar a hammer bebé  Es normal sentir un poco de [employer]  Es posible que le duelan los pezones si el bebé no se prende correctamente del pecho  El hecho de colocar al bebé en la posición correcta para que se prenda evelyn del pecho pueden eliminar o aliviar el dolor en los pezones  Pida a hammer médico que le enseñe a hacer que hammer bebé se prenda correctamente del pecho  También le puede ser de beneficio colocar gomez compresa tibia en safia pezones para ayudar a reducir el dolor  · Conductos lactíferos obstruidos  podría causar que se carol bultos dolorosos en los senos  Es posible que los conductos lactíferos se obstruyan si los senos no se vacían por completo cuando amamanta al bebé  Ileana Polio y apriete suavemente safia senos cuando el bebé tome un descanso de amamantar  Un masaje suave puede destapar un conducto obstruido  Extraiga la [employer] quede en safia senos gomez vez que el bebé termine de amamantar  Evite usar blusas o corpiños con armazón de alambre Kenneth's senos pues estos podrían poner demasiada presión en safia senos  · La congestión mamaria  ocurre cuando la leche baja después de que empiece a amamantar  La congestión mamaria puede causar que safia senos se inflamen y duelan  Puede también que esto suceda si el bebé se saltea gomez comida o si no le da el pecho cuando el bebé se lo pide  La mejor manera de combatir los síntomas de la ingurgitación Nessa es blas de comer al bebé a menudo para vaciar los senos   Puede que al bebé le resulte difícil prenderse de los senos cuando están congestionados  Si esto sucede, exprima gomez pequeña cantidad de [place] y después connie que el bebé se prenda del pecho  Puede colocarse compresas frías, paquetes de gel o de hielo [employer] senos para aliviar el dolor y la inflamación  Pregunte a hammer médico la frecuencia y cantidad de tiempo que usted debería usar las compresas steele o bolsa de hielo  · Gomez infeccion mamaria llamada mastitis  podría desarrollarse si usted tiene gomez obstrucción en los conductos mamarios o gomez congestión  [place] resultado de la mastitis, los senos se enrojecen, se inflaman y duelen  Es posible que también presente síntomas similares a los de la gripe, cinthya escalofríos y [place]  Colóquese calor sobre los senos para aliviar el dolor  Puede usar un paño húmedo caliente sobre el seno dolorido o sobre ambos senos  Pregunte a hammer médico con qué frecuencia debería de hacer esto  Es posible que hammer médico le sugiera que tome un medicamento antiinflamatorio no esteroideo, cinthya ibuprofeno, para aliviar el dolor y bajar la inflamación  Puede que hammer médico de cabecera también le recete un antibiótico para el tratamiento de la mastitis  Pregunte a hammer médico si debería amamantar al bebé cuando tiene gomez infección [place]  Cómo puede contribuir a prevenir o controlar los problemas con los senos mientras estoy lactando:   · Aprenda a colocar al bebé para que se prenda del pecho correctamente  Para que el bebé se prenda correctamente, asegúrese de que hammer boca cubra la mayor parte de la areola (la parte oscura que rodea al pezón)  No debería prenderse solamente del pezón  El bebé está en la posición correcta si usted se siente a gusto y no siente dolor  Cuando el pamela se prende correctamente el puede recibir suficiente leche y esto también puede prevenir dolor en los pezones y otros problemas con los senos  Hay varias posiciones para amamantar que usted [employer]  Escoja la posición que funciona mejor para usted y se bebé   Solicite mas información a hammer medico sobre cinthya sujetar y amamantar a hammer bebé  · Evite que el bebé la [place]  Es posible que el bebé comience a cortar los dientes a los 3 o 4 meses de edad  Para evitar que la [place], desprenda la succión gomez ve que haya acabado de amamantar o si se queda dormido  Para desprender la succión coloco hammer dedo en la esquina de hammer boca  Si la muerde, reaccione con sorpresa o disgusto  Anímelo cuando no la muerda  · Amamante a hammer bebé de forma regular  Alimente al bebé de 8 a 12 veces al día  Es posible que deba despertarlo para darle de comer eloisa la noche  Puede amamantarlo con 1 o ambos senos cada vez  El bebé debería alimentarse por igual de los [address] a lo leia del día  Si el bebé sólo eunice leche de 1 seno gomez vez, ofrézcale el otro seno marilia la próxima vez que lo amamante  · Programe y Kayla Knapp a todas randa citas de seguimiento  Hable con el médico del bebé o con hammer médico eloisa las visitas de seguimiento si está teniendo problemas con randa senos  Es posible que el [place] sugiera que Kayla Knapp, [place] o con hammer [place], a clases de lactancia materna  También podría ser buena idea participar en un vanessa de apoyo para madres lactantes  El médico puede sugerirle que consulte a un especialista en lactancia  Se trata de un médico que puede ayudarle con la lactancia materna  Comuníquese con hammer médico de cabecera si:   · Tiene fiebre o escalofríos  · Usted tiene timi en el cuerpo y siente que no tiene suficiente energía  · Ken o ambos senos están rojos, inflamados o duros, doloridos y se sienten tibios o calientes  · Tiene congestión mamaria que no mejora dentro de 24 horas  · Ve o siente un bulto en hammer seno que le duele al tocarlo  · Le duelen los pezones cuando amamanta o entre sesiones de lactancia  · Randa pezones están rojos, secos, agrietados, sangran o tienen costras  · Tiene alguna pregunta o inquietud acerca de hammer condición o cuidado    © 2014 [facility] [address] [place] [address] is for End User's use only and may not be sold, redistributed or otherwise used for commercial purposes  All illustrations and images included in CareNotes® are the copyrighted property of JORGE ACOSTA A M , Inc  or Logan Meyer  Esta información es sólo para uso en educación  Hammer intención no es darle un consejo médico sobre enfermedades o tratamientos  Colsulte con hammer Candido Lauth farmacéutico antes de seguir cualquier régimen médico para saber si es seguro y efectivo para usted

## 2018-03-19 NOTE — H&P
History & Physical - OB/GYN   [employer] 25 y o  female MRN: [id number]  Unit/Bed#: LD PACU-03 Encounter: [id number]    78 y o   at 39w1d by LMP  She is a patient of the [facility]    Chief complaint:  Scheduled RLTCS    HPI:  Contractions:  no  Fetal movement:  yes  Vaginal bleeding:   no  Leaking of fluid:  no      Pregnancy Complications:  Patient Active Problem List   Diagnosis    Maternal morbid obesity, antepartum ([address] )    Mild depression ([address] )    Previous  section complicating pregnancy    39 weeks gestation of pregnancy       PMH:  Past Medical History:   Diagnosis Date    Abnormal Pap smear of cervix     Obese     Varicella     vac       PSH:  Past Surgical History:   Procedure Laterality Date     SECTION      x2       Social Hx:  Denies tobacco, drugs, EtOH    OB Hx:  Obstetric History       T2      L2     SAB0   TAB0   Ectopic0   Multiple0   Live Births2       # Outcome Date GA Lbr Ameya/2nd Weight Sex Delivery Anes PTL Lv   3 Current            2 Term 05/28/15 40w0d  3260 g (7 lb 3 oz) F CS-LTranv  N RUTHIE   1 Term 11 38w0d  2977 g (6 lb 9 oz) M CS-LTranv EPI N RUTHIE          Meds:  No current facility-administered medications on file prior to encounter  Current Outpatient Prescriptions on File Prior to Encounter   Medication Sig Dispense Refill    Calcium 500-100 MG-UNIT CHEW Chew      folic acid (FOLVITE) 1 mg tablet Take by mouth daily      Prenatal Vit-Fe Fumarate-FA (PRENATAL MULTIVITAMIN) 28-0 8 MG TABS Take 1 tablet by mouth daily         Allergies:  No Known Allergies    Labs:  Blood type: O+  Antibody: negative  Group B strep: negative  HIV: negative  Hepatitis B: negative  RPR: NR  Rubella: Immune  1 hour Glucose: 119    Physical Exam:  /59   Pulse (!) 106   Temp 98 3 °F (36 8 °C) (Oral)   Resp 18   Ht 5' 4" (1 626 m)   Wt 103 kg (227 lb)   LMP 2017   Breastfeeding?  Yes   BMI 38 96 kg/m²     Physical Exam Constitutional: She is oriented to person, place, and time  She appears well-developed and well-nourished  HENT:   Head: Normocephalic and atraumatic  Eyes: No scleral icterus  Cardiovascular: Normal rate, regular rhythm and normal heart sounds  Exam reveals no gallop and no friction rub  No murmur heard  Pulmonary/Chest: Effort normal and breath sounds normal  No respiratory distress  She has no wheezes  She has no rales  Abdominal: She exhibits distension (gravid)  There is no tenderness  There is no rebound and no guarding  Neurological: She is alert and oriented to person, place, and time  Skin: Skin is warm and dry  No rash noted  No erythema  No pallor  Psychiatric: She has a normal mood and affect  Her behavior is normal        Estimated Fetal Weight: 7 5 lbs    FHT:  135 / Moderate 6 - 25 bpm / +accels, reactive  [place]: quiet    Membranes: intact    Assessment:   25 y o  [id number] at 39w1d for scheduled RLTCS    Plan:   1  Admit to L&D  2  CBC, RPR, type and screen  3  Ancef for ppx  4  Abdominal prep, SCDs, adames  5   Anesthesia and NICU aware    Dr Rubina Luther aware

## 2018-03-19 NOTE — ANESTHESIA PROCEDURE NOTES
Spinal Block    Patient location during procedure: OR  Start time: 3/19/2018 4:38 PM  End time: 3/19/2018 4:44 PM  Reason for block: at surgeon's request, post-op pain management and primary anesthetic  Staffing  Anesthesiologist: Connor Farris  Performed: anesthesiologist   Preanesthetic Checklist  Completed: patient identified, site marked, surgical consent, pre-op evaluation, timeout performed, IV checked, risks and benefits discussed and monitors and equipment checked  Spinal Block  Patient position: sitting  Prep: Betadine  Patient monitoring: heart rate, cardiac monitor, continuous pulse ox and frequent blood pressure checks  Approach: midline  Location: L3-4  Injection technique: single-shot  Needle  Needle type: pencil-tip   Needle gauge: 24 G  Needle length: 10 cm  Assessment  Sensory level: [id number] Assessment:  negative aspiration for heme, no paresthesia on injection and positive aspiration for clear CSF    Post-procedure:  site cleaned

## 2018-03-19 NOTE — PROGRESS NOTES
Patient's scheduled  section delayed secondary to the arrival of multiple unscheduled, higher-acuity patients requiring operative intervention  To bedside to discuss this with patient, including that there remains a few laboring patients and prior scheduled patients ahead of her  In light of this wait time reviewed with patient options, including continued wait for  section today as planned vs discharge with rescheduled  for tomorrow  Patient and her  would prefer to stay today as planned  Will assess NST now (poorly continuous tracing due to positioning and fetal movement) and if reassuring, will proceed with intermittent fetal monitoring      Tutu Hong MD  18  2:07 PM

## 2018-03-19 NOTE — ANESTHESIA PREPROCEDURE EVALUATION
Review of Systems/Medical History  Patient summary reviewed  Chart reviewed      Cardiovascular  Negative cardio ROS    Pulmonary  Negative pulmonary ROS        GI/Hepatic  Negative GI/hepatic ROS          Negative  ROS        Endo/Other    Obesity    GYN  Currently pregnant , Prior pregnancy/OB history : 3 Parity: 2,     Comment: Hx of c/s x2     Hematology  Negative hematology ROS      Musculoskeletal  Negative musculoskeletal ROS        Neurology  Negative neurology ROS      Psychology   Depression ,              Physical Exam    Airway    Mallampati score: II  TM Distance: >3 FB  Neck ROM: full     Dental   No notable dental hx     Cardiovascular  Comment: Negative ROS, Rhythm: regular, Rate: normal, Cardiovascular exam normal    Pulmonary  Pulmonary exam normal Breath sounds clear to auscultation,     Other Findings        Anesthesia Plan  ASA Score- 2     Anesthesia Type- spinal with ASA Monitors  Additional Monitors:   Airway Plan:         Plan Factors-    Induction-     Postoperative Plan-     Informed Consent- Anesthetic plan and risks discussed with patient  Recent labs personally reviewed:  Lab Results   Component Value Date    WBC 8 67 2018    HGB 11 6 2018     2018     Lab Results   Component Value Date     2016    K 4 3 2016    BUN 12 2016    CREATININE 0 67 2016    GLUCOSE 78 2016     No results found for: PTT   No results found for: INR    Blood type O    Lab Results   Component Value Date    HGBA1C 5 4 2016       I, Iman Rowe MD, have personally seen and evaluated the patient prior to anesthetic care  I have reviewed the pre-anesthetic record, and other medical records if appropriate to the anesthetic care  If a CRNA is involved in the case, I have reviewed the CRNA assessment, if present, and agree   Risks/benefits and alternatives discussed with patient including possible PONV, sore throat, and possibility of rare anesthetic and surgical emergencies

## 2018-03-19 NOTE — DISCHARGE SUMMARY
Discharge Summary - Sis Shields 25 y o  female MRN: [id number]    Unit/Bed#: LD OR  Encounter: [id number]    Admission Date: 3/19/2018     Discharge Date: 3/22/18    Admitting Diagnosis:   1  Pregnancy at 39w1d  2  History of  section    Discharge Diagnosis: same, delivered    Procedures: repeat  section, low transverse incision    Delivering Attending: Jameel Garay MD     Discharging Attending: Dr Christy Jorgensen Course:     Sis Shields is a 25 y o   at 39w1d wks who was initially admitted for scheduled RLTCS  She delivered a viable female  on 18 at 1  Weight 7lbs 0oz via repeat  section, low transverse incision  Apgars were 9 (1 min) and 9 (5 min)   was transferred to  nursery  Patient tolerated the procedure well and was transferred to recovery in stable condition  Her post-operative course was uncomplicated  Preoperative hemaglobin was 14 8, postoperative was 11 0  Her postoperative pain was well controlled with oral analgesics  On day of discharge, she was ambulating and able to reasonably perform all ADLs  She was voiding and had appropriate bowel function  Pain was well controlled  She was discharged home on post-operative day #3 without complications  Patient was instructed to follow up with her OB as an outpatient and was given appropriate warnings to call provider if she develops signs of infection or uncontrolled pain  Complications: none apparent    Condition at discharge: good     Discharge instructions/Information to patient and family:   See after visit summary for information provided to patient and family  Provisions for Follow-Up Care:  See after visit summary for information related to follow-up care and any pertinent home health orders        Disposition: Home    Planned Readmission: No    Roxy Epstein MD  OBGYN, PGY-1  3/22/2018 6:36 AM

## 2018-03-20 LAB
ERYTHROCYTE [DISTWIDTH] IN BLOOD BY AUTOMATED COUNT: 14.3 % (ref 11.6–15.1)
HCT VFR BLD AUTO: 32.7 % (ref 34.8–46.1)
HGB BLD-MCNC: 11 G/DL (ref 11.5–15.4)
MCH RBC QN AUTO: 30.6 PG (ref 26.8–34.3)
MCHC RBC AUTO-ENTMCNC: 33.6 G/DL (ref 31.4–37.4)
MCV RBC AUTO: 91 FL (ref 82–98)
PLATELET # BLD AUTO: 172 THOUSANDS/UL (ref 149–390)
PMV BLD AUTO: 11 FL (ref 8.9–12.7)
RBC # BLD AUTO: 3.6 MILLION/UL (ref 3.81–5.12)
RPR SER QL: NORMAL
WBC # BLD AUTO: 5.71 THOUSAND/UL (ref 4.31–10.16)

## 2018-03-20 PROCEDURE — 85027 COMPLETE CBC AUTOMATED: CPT | Performed by: OBSTETRICS & GYNECOLOGY

## 2018-03-20 PROCEDURE — 99024 POSTOP FOLLOW-UP VISIT: CPT | Performed by: OBSTETRICS & GYNECOLOGY

## 2018-03-20 RX ADMIN — OXYCODONE HYDROCHLORIDE AND ACETAMINOPHEN 2 TABLET: 5; 325 TABLET ORAL at 18:28

## 2018-03-20 RX ADMIN — KETOROLAC TROMETHAMINE 30 MG: 30 INJECTION, SOLUTION INTRAMUSCULAR at 12:26

## 2018-03-20 RX ADMIN — KETOROLAC TROMETHAMINE 30 MG: 30 INJECTION, SOLUTION INTRAMUSCULAR at 05:42

## 2018-03-20 RX ADMIN — ACETAMINOPHEN 650 MG: 325 TABLET, FILM COATED ORAL at 05:45

## 2018-03-20 RX ADMIN — DOCUSATE SODIUM 100 MG: 100 CAPSULE, LIQUID FILLED ORAL at 18:26

## 2018-03-20 RX ADMIN — DOCUSATE SODIUM 100 MG: 100 CAPSULE, LIQUID FILLED ORAL at 12:26

## 2018-03-20 NOTE — PROGRESS NOTES
Progress Note - OB/GYN   Abena Batista Andres 25 y o  female MRN: [id number]  Unit/Bed#: -77 Encounter: [id number]    Assessment:  Post Op Day #1 s/p RLTCS, stable    Plan:  1) Continue routine post partum care   -Encourage ambulation   -Encourage breastfeeding   - Pain control PRN   - Adames catheter in place, to be removed this morning and f/u                           voiding trial  2) Pre-op hgb: 14 8--> f/u AM CBC  3) Contraception: will address tomorrow  4) Dispo: VSS, anticipate d/c home on POD #3  Subjective/Objective   Chief Complaint:     Post delivery    Subjective:   No complaints this morning  Pain: no  Tolerating PO: yes  Voiding: yes via adames  Flatus: yes  BM: no  Ambulating: no  Breastfeeding:  yes  Chest pain: no  Shortness of breath: no  Leg pain: no  Lochia: minimal    Objective:     Vitals: BP (!) 99/48 (BP Location: Left arm) Comment: was sleeping  Pulse 77   Temp 98 7 °F (37 1 °C) (Oral)   Resp 16   Ht 5' 4" (1 626 m)   Wt 103 kg (227 lb)   LMP 06/18/2017   SpO2 99%   Breastfeeding?  Yes   BMI 38 96 kg/m²       Intake/Output Summary (Last 24 hours) at 03/20/18 0644  Last data filed at 03/20/18 0600   Gross per 24 hour   Intake             1860 ml   Output             1625 ml   Net              235 ml       Lab Results   Component Value Date    WBC 4 07 (L) 03/19/2018    HGB 14 8 03/19/2018    HCT 46 7 (H) 03/19/2018    MCV 90 03/19/2018     03/19/2018       Meds/Allergies   Current Facility-Administered Medications   Medication Dose Route Frequency    acetaminophen (TYLENOL) tablet 650 mg  650 mg Oral Q6H PRN    dexamethasone (DECADRON) injection 8 mg  8 mg Intravenous Once PRN    diphenhydrAMINE (BENADRYL) injection 25 mg  25 mg Intravenous Q6H PRN    docusate sodium (COLACE) capsule 100 mg  100 mg Oral BID    fentaNYL (SUBLIMAZE) injection 50 mcg  50 mcg Intravenous Q3 min PRN    HYDROmorphone (DILAUDID) injection 0 4 mg  0 4 mg Intravenous Q5 Min PRN    ibuprofen (MOTRIN) tablet 600 mg  600 mg Oral Q6H PRN    ketorolac (TORADOL) injection 30 mg  30 mg Intravenous Q6H [address]    lactated ringers infusion  125 mL/hr Intravenous Continuous    metoclopramide (REGLAN) injection 5 mg  5 mg Intravenous Q6H PRN    nalbuphine (NUBAIN) injection 5 mg  5 mg Intravenous Q3H PRN    ondansetron (ZOFRAN) injection 4 mg  4 mg Intravenous Once PRN    ondansetron (ZOFRAN) injection 4 mg  4 mg Intravenous Q4H PRN    ondansetron (ZOFRAN) injection 4 mg  4 mg Intravenous Q8H PRN    oxyCODONE-acetaminophen (PERCOCET) 5-325 mg per tablet 1 tablet  1 tablet Oral Q4H PRN    oxyCODONE-acetaminophen (PERCOCET) 5-325 mg per tablet 2 tablet  2 tablet Oral Q4H PRN    oxyCODONE-acetaminophen (PERCOCET) 5-325 mg per tablet 2 tablet  2 tablet Oral Q4H PRN    simethicone (MYLICON) chewable tablet 80 mg  80 mg Oral 4x Daily PRN       Physical Exam:     Gen: AAOx3, NAD  CV: RRR  Lungs: CTA b/l  Abd: Soft, non-tender, non-distended, no rebound or guarding  Uterine fundus firm and non-tender,  at the umbilicus   Incision: C/D/I with no signs of infection  Lochia: minimal on pad  Lopez catheter in place with 700cc of bambi urine  Ext: Non tender, SCDs in place    Serafin Henriquez MD  OBGYN, PGY-1  3/20/2018 6:48 AM

## 2018-03-20 NOTE — SOCIAL WORK
Pt is requesting a breast pump and has no preference  Referral made to [facility] to deliver to bedside

## 2018-03-20 NOTE — PROGRESS NOTES
Blue phone used for pt communication/education   #[id number], Maria Esther Jarvis  Pt requesting formula supplementation for baby with breast feeding until her milk supply comes in  Risks to supplementation explained as well as the benefits of colostrum  Pt feels that baby is nursing too frequently and would like formula to space out breastfeeds more  Discussed today's plan of care, medications to be given, and post operative care  All questions answered  Pt states verbal understanding

## 2018-03-21 PROCEDURE — 99024 POSTOP FOLLOW-UP VISIT: CPT | Performed by: OBSTETRICS & GYNECOLOGY

## 2018-03-21 RX ADMIN — IBUPROFEN 600 MG: 600 TABLET ORAL at 20:05

## 2018-03-21 RX ADMIN — OXYCODONE HYDROCHLORIDE AND ACETAMINOPHEN 1 TABLET: 5; 325 TABLET ORAL at 16:46

## 2018-03-21 RX ADMIN — IBUPROFEN 600 MG: 600 TABLET ORAL at 06:11

## 2018-03-21 RX ADMIN — IBUPROFEN 600 MG: 600 TABLET ORAL at 00:27

## 2018-03-21 RX ADMIN — DOCUSATE SODIUM 100 MG: 100 CAPSULE, LIQUID FILLED ORAL at 20:03

## 2018-03-21 RX ADMIN — OXYCODONE HYDROCHLORIDE AND ACETAMINOPHEN 2 TABLET: 5; 325 TABLET ORAL at 12:19

## 2018-03-21 RX ADMIN — OXYCODONE HYDROCHLORIDE AND ACETAMINOPHEN 2 TABLET: 5; 325 TABLET ORAL at 21:31

## 2018-03-21 RX ADMIN — DOCUSATE SODIUM 100 MG: 100 CAPSULE, LIQUID FILLED ORAL at 10:30

## 2018-03-21 NOTE — LACTATION NOTE
This note was copied from a baby's chart  Abena planned on feeding her infant both breast milk and formula until her milk "came in"

## 2018-03-21 NOTE — LACTATION NOTE
This note was copied from a baby's chart  CONSULT - LACTATION  Baby Girl  (Abena) Karlynn Kawasaki 1 days female MRN: [id number]    [facility] Room / Bed:  322(N)/(N) Encounter: [id number]    Maternal Information     MOTHER:  Michelle No  Maternal Age: 25 y o    OB History: #: 1, Date: 11, Sex: Male, Weight: 2977 g (6 lb 9 oz), GA: 38w0d, Delivery: , Low Transverse, Apgar1: None, Apgar5: None, Living: Living, Birth Comments: None    #: 2, Date: 05/28/15, Sex: Female, Weight: 3260 g (7 lb 3 oz), GA: 40w0d, Delivery: , Low Transverse, Apgar1: None, Apgar5: None, Living: Living, Birth Comments: None    #: 3, Date: 18, Sex: Female, Weight: 3175 g (7 lb), GA: 39w1d, Delivery: , Low Transverse, Apgar1: 9, Apgar5: 9, Living: Living, Birth Comments: None   Previouse breast reduction surgery?  No    Lactation history:   Has patient previously breast fed: Yes   How long had patient previously breast fed:  8year old for one year, 1year old for 7 months   Previous breast feeding complications: None     Past Surgical History:   Procedure Laterality Date     SECTION      x2    ND  DELIVERY ONLY N/A 3/19/2018    Procedure:  SECTION () REPEAT;  Surgeon: Betzaida Caldera MD;  Location: [facility] ;  Service: Obstetrics       Birth information:  YOB: 2020   Time of birth: 5:04 PM   Sex: female   Delivery type: , Low Transverse   Birth Weight: 3175 g (7 lb)   Percent of Weight Change: -2%     Gestational Age: 36w3d   [unfilled]    Assessment     Breast and nipple assessment: normal assessment     Assessment: normal assessment    Feeding assessment: feeding well  LATCH:  Latch: Grasps breast, tongue down, lips flanged, rhythmic sucking   Audible Swallowing: Spontaneous and intermittent (24 hours old)   Type of Nipple: Everted (After stimulation)   Comfort (Breast/Nipple): Soft/non-tender   Hold (Positioning): No assist from staff, mother able to position/hold infant   LATCH Score: 10          Feeding recommendations:  breast feed on demand     Spent time working on different positions that would facilitate better transfer of breastmilk  Encouraged MOB to call for assistance, questions, and concerns about breastfeeding  Extension provided          Geraldo Smith RN 3/20/2018 8:06 PM

## 2018-03-21 NOTE — PROGRESS NOTES
Progress Note - OB/GYN   Abena Garcia 25 y o  female MRN: [id number]  Unit/Bed#:  437-80 Encounter: [id number]    Assessment:  Post Op Day #2 s/p RLTCS, stable    Plan:  1) Continue routine post partum care   -Encourage ambulation   -Encourage breastfeeding   - Pain control PRN  2) Pre-op hgb: 14 8--> 11 0, VSS, asymptomatic  3) Contraception: Pt and  to use condoms  Declined other options at this time  4) Dispo: VSS, anticipate d/c home on POD #3  Subjective/Objective   Chief Complaint:     Post delivery    Subjective:   No complaints this morning  Pain: no  Tolerating PO: yes  Voiding: yes   Flatus: yes  BM: no  Ambulating: yes  Breastfeeding:  yes  Chest pain: no  Shortness of breath: no  Leg pain: no  Lochia: minimal    Objective:     Vitals: /57   Pulse 66   Temp 98 °F (36 7 °C) (Oral)   Resp 18   Ht 5' 4" (1 626 m)   Wt 103 kg (227 lb)   LMP 06/18/2017   SpO2 98%   Breastfeeding?  Yes   BMI 38 96 kg/m²       Intake/Output Summary (Last 24 hours) at 03/21/18 0634  Last data filed at 03/20/18 2030   Gross per 24 hour   Intake                0 ml   Output              850 ml   Net             -850 ml       Lab Results   Component Value Date    WBC 5 71 03/20/2018    HGB 11 0 (L) 03/20/2018    HCT 32 7 (L) 03/20/2018    MCV 91 03/20/2018     03/20/2018       Meds/Allergies   Current Facility-Administered Medications   Medication Dose Route Frequency    acetaminophen (TYLENOL) tablet 650 mg  650 mg Oral Q6H PRN    diphenhydrAMINE (BENADRYL) injection 25 mg  25 mg Intravenous Q6H PRN    docusate sodium (COLACE) capsule 100 mg  100 mg Oral BID    fentaNYL (SUBLIMAZE) injection 50 mcg  50 mcg Intravenous Q3 min PRN    HYDROmorphone (DILAUDID) injection 0 4 mg  0 4 mg Intravenous Q5 Min PRN    ibuprofen (MOTRIN) tablet 600 mg  600 mg Oral Q6H PRN    lactated ringers infusion  125 mL/hr Intravenous Continuous    ondansetron (ZOFRAN) injection 4 mg  4 mg Intravenous Once PRN    ondansetron (ZOFRAN) injection 4 mg  4 mg Intravenous Q8H PRN    oxyCODONE-acetaminophen (PERCOCET) 5-325 mg per tablet 1 tablet  1 tablet Oral Q4H PRN    oxyCODONE-acetaminophen (PERCOCET) 5-325 mg per tablet 2 tablet  2 tablet Oral Q4H PRN    simethicone (MYLICON) chewable tablet 80 mg  80 mg Oral 4x Daily PRN       Physical Exam:     Gen: AAOx3, NAD  CV: RRR  Lungs: CTA b/l  Abd: Soft, non-tender, non-distended, no rebound or guarding, positive bowel sounds  Uterine fundus firm and non-tender,  at the umbilicus   Incision: C/D/I with no signs of infection  Lochia: minimal on pad  Ext: Non tender    Wenceslao Rodriguez MD  OBGYN, PGY-1  3/21/2018 6:34 AM

## 2018-03-22 VITALS
SYSTOLIC BLOOD PRESSURE: 108 MMHG | DIASTOLIC BLOOD PRESSURE: 69 MMHG | TEMPERATURE: 98.2 F | HEIGHT: 64 IN | HEART RATE: 74 BPM | BODY MASS INDEX: 38.76 KG/M2 | RESPIRATION RATE: 20 BRPM | OXYGEN SATURATION: 97 % | WEIGHT: 227 LBS

## 2018-03-22 PROCEDURE — 99024 POSTOP FOLLOW-UP VISIT: CPT | Performed by: OBSTETRICS & GYNECOLOGY

## 2018-03-22 RX ORDER — OXYCODONE HYDROCHLORIDE AND ACETAMINOPHEN 5; 325 MG/1; MG/1
1-2 TABLET ORAL EVERY 4 HOURS PRN
Qty: 15 TABLET | Refills: 0 | Status: SHIPPED | OUTPATIENT
Start: 2018-03-22 | End: 2018-04-01

## 2018-03-22 RX ORDER — IBUPROFEN 600 MG/1
600 TABLET ORAL EVERY 6 HOURS PRN
Qty: 30 TABLET | Refills: 0
Start: 2018-03-22 | End: 2019-10-02

## 2018-03-22 RX ORDER — SIMETHICONE 80 MG
80 TABLET,CHEWABLE ORAL 4 TIMES DAILY PRN
Qty: 30 TABLET | Refills: 0
Start: 2018-03-22 | End: 2019-10-02

## 2018-03-22 RX ORDER — DOCUSATE SODIUM 100 MG/1
100 CAPSULE, LIQUID FILLED ORAL 2 TIMES DAILY
Qty: 10 CAPSULE | Refills: 0
Start: 2018-03-22 | End: 2019-10-02

## 2018-03-22 RX ORDER — ACETAMINOPHEN 325 MG/1
650 TABLET ORAL EVERY 6 HOURS PRN
Qty: 30 TABLET | Refills: 0
Start: 2018-03-22 | End: 2019-12-04 | Stop reason: ALTCHOICE

## 2018-03-22 RX ADMIN — DOCUSATE SODIUM 100 MG: 100 CAPSULE, LIQUID FILLED ORAL at 09:04

## 2018-03-22 RX ADMIN — OXYCODONE HYDROCHLORIDE AND ACETAMINOPHEN 1 TABLET: 5; 325 TABLET ORAL at 09:04

## 2018-03-22 RX ADMIN — IBUPROFEN 600 MG: 600 TABLET ORAL at 06:30

## 2018-03-22 NOTE — PROGRESS NOTES
Progress Note - OB/GYN   Edith Woods 25 y o  female MRN: [id number]  Unit/Bed#: -38 Encounter: [id number]    Assessment:  Post Op Day #3 s/p RLTCS, stable    Plan:  1) Continue routine post partum care   -Encourage ambulation   -Encourage breastfeeding   - Pain control PRN  2) Pre-op hgb: 14 8--> 11 0, VSS, asymptomatic  3) Contraception: Pt and  to use condoms  Declined other options  4) Dispo: VSS, D/C home today  Subjective/Objective   Chief Complaint:     Post delivery    Subjective:   No complaints this morning  Pain: minimal  Tolerating PO: yes  Voiding: yes   Flatus: yes  BM: no  Ambulating: yes  Breastfeeding:  yes  Chest pain: no  Shortness of breath: no  Leg pain: no  Lochia: minimal    Objective:     Vitals: /64 (BP Location: Right arm)   Pulse 61   Temp 98 4 °F (36 9 °C) (Oral)   Resp 18   Ht 5' 4" (1 626 m)   Wt 103 kg (227 lb)   LMP 06/18/2017   SpO2 97%   Breastfeeding?  Yes   BMI 38 96 kg/m²     No intake or output data in the 24 hours ending 03/22/18 0633    Lab Results   Component Value Date    WBC 5 71 03/20/2018    HGB 11 0 (L) 03/20/2018    HCT 32 7 (L) 03/20/2018    MCV 91 03/20/2018     03/20/2018       Meds/Allergies   Current Facility-Administered Medications   Medication Dose Route Frequency    acetaminophen (TYLENOL) tablet 650 mg  650 mg Oral Q6H PRN    diphenhydrAMINE (BENADRYL) injection 25 mg  25 mg Intravenous Q6H PRN    docusate sodium (COLACE) capsule 100 mg  100 mg Oral BID    fentaNYL (SUBLIMAZE) injection 50 mcg  50 mcg Intravenous Q3 min PRN    HYDROmorphone (DILAUDID) injection 0 4 mg  0 4 mg Intravenous Q5 Min PRN    ibuprofen (MOTRIN) tablet 600 mg  600 mg Oral Q6H PRN    lactated ringers infusion  125 mL/hr Intravenous Continuous    ondansetron (ZOFRAN) injection 4 mg  4 mg Intravenous Once PRN    ondansetron (ZOFRAN) injection 4 mg  4 mg Intravenous Q8H PRN    oxyCODONE-acetaminophen (PERCOCET) 5-325 mg per tablet 1 tablet  1 tablet Oral Q4H PRN    oxyCODONE-acetaminophen (PERCOCET) 5-325 mg per tablet 2 tablet  2 tablet Oral Q4H PRN    simethicone (MYLICON) chewable tablet 80 mg  80 mg Oral 4x Daily PRN       Physical Exam:     Gen: AAOx3, NAD  CV: RRR  Lungs: CTA b/l  Abd: Soft, non-tender, non-distended, no rebound or guarding, positive bowel sounds  Uterine fundus firm and non-tender,  at the umbilicus   Incision: C/D/I with no signs of infection  Lochia: minimal on pad  Ext: Non tender    Leafy MD Chava  OBGYN, PGY-1  3/22/2018 6:33 AM

## 2018-03-22 NOTE — CASE MANAGEMENT
Notification of Maternity Inpatient Admission/Maternity Inpatient Authorization Request  This is a Notification of Maternity Inpatient Admission/Maternity Inpatient Authorization Request to our facility [facility]  Please be advised that this patient is currently in our facility under Inpatient Status  Below you will find the Birth/ Summary, Attending Physician and Facilitys information including NPI# and contact for the Utilization  assigned to the [facility] where the patient is receiving services  Please feel free to contact the Utilization Review Department with any questions  Mothers Information:  Alma Pradhan  MRN: [id number]  YOB: 1990  Admission Date: 3/19/2018 11:41 AM  Discharge Date: 3/22/2018  1:05 PM  Disposition: Home/Self Care  Admitting Diagnosis:   O82  DELIVERY  Lynd Information:  Estimated Date of Delivery: 3/25/18  Information for the patient's :  Rowdy Deras Girl  Marques Mooring) [[id number]]     Lynd Delivery Information:  Sex: female  Delivered 3/19/2018 5:04 PM by , Low Transverse; Gestational Age: 36w3d     Measurements:  Weight: 7 lb (3175 g); Height: 19"    APGAR 1 minute 5 minutes 10 minutes   Totals: 9 9      OB History      Para Term  AB Living    3 3 3 0 0 3    SAB TAB Ectopic Multiple Live Births    0 0 0 0 3        Attending Physician:  DWAYNE Earl  Specialty- Obstetrics and Gynecology  [address] [id number]  [address] [place] [address]  Phone 1: [phone]  Fax: [phone]    [facility] [address]  [phone]  Tax ID: [id number]  NPI: [id number]    [facility] in the [facility] by Logan Meyer for 2017  Network Utilization Review Department  Phone: [phone];  Fax [phone]  ATTENTION: The Network Utilization Review Department is now centralized for our 7 Facilities  Make a note that we have a new phone and fax numbers for our Department  Please call with any questions or concerns to [phone] and carefully follow the prompts so that you are directed to the right person  All voicemails are confidential  Fax any determinations, approvals, denials, and requests for initial or continue stay review clinical to [phone]  Due to HIGH CALL volume, it would be easier if you could please send faxed requests to expedite your requests and in part, help us provide discharge notifications faster

## 2018-03-26 ENCOUNTER — OFFICE VISIT (OUTPATIENT)
Dept: OBGYN CLINIC | Facility: HOSPITAL | Age: 28
End: 2018-03-26
Payer: COMMERCIAL

## 2018-03-26 VITALS
WEIGHT: 213.2 LBS | BODY MASS INDEX: 36.4 KG/M2 | DIASTOLIC BLOOD PRESSURE: 66 MMHG | HEART RATE: 67 BPM | HEIGHT: 64 IN | SYSTOLIC BLOOD PRESSURE: 125 MMHG

## 2018-03-26 DIAGNOSIS — Z98.891 STATUS POST REPEAT LOW TRANSVERSE CESAREAN SECTION: Primary | ICD-10-CM

## 2018-03-26 PROCEDURE — 99213 OFFICE O/P EST LOW 20 MIN: CPT | Performed by: OBSTETRICS & GYNECOLOGY

## 2018-03-26 NOTE — PROGRESS NOTES
Assessment/Plan:  Problem List Items Addressed This Visit        Other    Status post repeat low transverse  section - Primary     -Incision site is healing well  Clean and intact  No erythema or pus drainage    -Patient is currently taking percocet for pain  Advised patient to take Tylenol prn q6 hour if pain persists once percocet is no longer needed  -Advised to keep incision site clean and dry  If any changes in site, patient should return to office    -If pain persists, patient should call the office   -Does not want contraception methods as patient states her  uses condoms and would not like anything else     -follow up in 2 weeks postpartum visit  D/w Dr Giovanni Scheuermann                Subjective:      Patient ID: Bianca Vyas is a 25 y o  female  26 y/o  present for post-op incision site check  Patient delivered on 3/19/18 to healthy infant with apgars 9 and 9  She is currently breastfeeding  She states she does have some pain that is 4/10 which she is using percocet for  She denies any drainage or redness at the incision site  She has minimal vaginal bleeding at this time  She denies any dysuria, flank pain, chest pain, or shortness of breath  She has had appropriate bladder and bowel functions  No fevers as per patient  The following portions of the patient's history were reviewed and updated as appropriate: allergies, current medications, past family history, past medical history, past social history, past surgical history and problem list     Review of Systems   Constitutional: Negative for fever  Respiratory: Negative for shortness of breath  Cardiovascular: Negative for chest pain  Gastrointestinal: Positive for abdominal pain  Negative for abdominal distention and nausea  Genitourinary: Negative for difficulty urinating, dysuria, frequency and urgency  Musculoskeletal: Negative for back pain  Neurological: Negative for dizziness and headaches  Objective:      /66 (BP Location: Left arm)   Pulse 67   Ht 5' 4" (1 626 m)   Wt 96 7 kg (213 lb 3 2 oz)   LMP 06/18/2017   BMI 36 60 kg/m²          Physical Exam   Constitutional: She is oriented to person, place, and time  She appears well-developed and well-nourished  Eyes: EOM are normal  Right eye exhibits no discharge  Left eye exhibits no discharge  Neck: Normal range of motion  Cardiovascular: Normal rate, regular rhythm, normal heart sounds and intact distal pulses  Pulmonary/Chest: Effort normal and breath sounds normal    Abdominal: Soft  Bowel sounds are normal  There is tenderness (pain on palpation near the incision site  )  Musculoskeletal: Normal range of motion  She exhibits no edema  Neurological: She is alert and oriented to person, place, and time  Skin: Skin is warm  Incision site clean and intact  No erythema, swelling, or pus drainage  Psychiatric: She has a normal mood and affect   Her behavior is normal

## 2018-03-26 NOTE — ASSESSMENT & PLAN NOTE
-Incision site is healing well  Clean and intact  No erythema or pus drainage    -Patient is currently taking percocet for pain  Advised patient to take Tylenol prn q6 hour if pain persists once percocet is no longer needed  -Advised to keep incision site clean and dry  If any changes in site, patient should return to office    -If pain persists, patient should call the office   -Does not want contraception methods as patient states her  uses condoms and would not like anything else     -follow up in 2 weeks postpartum visit       D/w Dr Carrol Yousif

## 2018-03-30 LAB — PLACENTA IN STORAGE: NORMAL

## 2018-04-09 ENCOUNTER — PATIENT OUTREACH (OUTPATIENT)
Dept: OBGYN CLINIC | Facility: HOSPITAL | Age: 28
End: 2018-04-09

## 2018-04-09 ENCOUNTER — OFFICE VISIT (OUTPATIENT)
Dept: OBGYN CLINIC | Facility: HOSPITAL | Age: 28
End: 2018-04-09

## 2018-04-09 VITALS — WEIGHT: 200 LBS | SYSTOLIC BLOOD PRESSURE: 119 MMHG | DIASTOLIC BLOOD PRESSURE: 82 MMHG | BODY MASS INDEX: 34.33 KG/M2

## 2018-04-09 DIAGNOSIS — Z98.891 STATUS POST REPEAT LOW TRANSVERSE CESAREAN SECTION: ICD-10-CM

## 2018-04-09 PROBLEM — Z3A.39 39 WEEKS GESTATION OF PREGNANCY: Status: RESOLVED | Noted: 2018-02-14 | Resolved: 2018-04-09

## 2018-04-09 PROCEDURE — PNV: Performed by: OBSTETRICS & GYNECOLOGY

## 2018-04-09 NOTE — PROGRESS NOTES
Assessment/Plan:      Diagnoses and all orders for this visit:    Postpartum care following  delivery    Status post repeat low transverse  section      Incision healing well  Breastfeeding  Condoms for contraception  Last pap  was negative cytology, next one due 2019  RTC in 1 year for annual    Subjective:     Patient ID: Socorro Nix is a 25 y o  female  She underwent an uncomplicated repeat low transverse  section on 3/22  Her post partum course was uncomplicated  She did not receive anything for contraception prior to leaving the hospital  Patient is currently using condoms for contraception  She does not want anything else at this time  Patient has noted a small amount of drainage from her incision on the left side  She states it is watery bloody fluid  She denies any purulent drainage, redness, fevers or chills  She is currently breastfeeding and pumping  She denies any other issues  [place] score was 0          Review of Systems   Constitutional: Negative  Respiratory: Negative  Cardiovascular: Negative  Gastrointestinal: Negative  Genitourinary: Negative  Negative for menstrual problem, pelvic pain, vaginal bleeding and vaginal discharge  Objective:     Physical Exam   Constitutional: She is oriented to person, place, and time  She appears well-developed and well-nourished  No distress  HENT:   Head: Normocephalic and atraumatic  Pulmonary/Chest: No respiratory distress  Abdominal: Soft  She exhibits no distension  There is no tenderness  There is no rebound and no guarding  Neurological: She is alert and oriented to person, place, and time  Skin:   Incision healing well  Small 1cm seroma at the upper edge of the left side of transverse incision  No evidence of erythema, induration, purulent drainage  End of suture noted at right side, cut with scissors at skin level   Vitals reviewed

## 2019-07-29 ENCOUNTER — HOSPITAL ENCOUNTER (EMERGENCY)
Facility: HOSPITAL | Age: 29
Discharge: HOME | End: 2019-07-29
Attending: EMERGENCY MEDICINE

## 2019-07-29 VITALS
RESPIRATION RATE: 18 BRPM | HEIGHT: 64 IN | SYSTOLIC BLOOD PRESSURE: 151 MMHG | OXYGEN SATURATION: 96 % | TEMPERATURE: 98.5 F | HEART RATE: 69 BPM | WEIGHT: 220 LBS | DIASTOLIC BLOOD PRESSURE: 85 MMHG | BODY MASS INDEX: 37.56 KG/M2

## 2019-07-29 DIAGNOSIS — Z34.90 PREGNANCY AT EARLY STAGE: ICD-10-CM

## 2019-07-29 DIAGNOSIS — L23.9 ALLERGIC DERMATITIS: Primary | ICD-10-CM

## 2019-07-29 PROCEDURE — 99283 EMERGENCY DEPT VISIT LOW MDM: CPT | Performed by: PHYSICIAN ASSISTANT

## 2019-07-29 PROCEDURE — 99282 EMERGENCY DEPT VISIT SF MDM: CPT

## 2019-07-29 RX ORDER — DIAPER,BRIEF,INFANT-TODD,DISP
EACH MISCELLANEOUS
Qty: 15 G | Refills: 0 | Status: SHIPPED | OUTPATIENT
Start: 2019-07-29 | End: 2019-10-02

## 2019-07-29 RX ORDER — DIPHENHYDRAMINE HCL 25 MG
25 TABLET ORAL EVERY 6 HOURS
Qty: 20 TABLET | Refills: 0 | Status: SHIPPED | OUTPATIENT
Start: 2019-07-29 | End: 2019-10-02

## 2019-07-29 RX ORDER — FAMOTIDINE 20 MG
1 TABLET ORAL DAILY
Qty: 30 EACH | Refills: 0 | Status: SHIPPED | OUTPATIENT
Start: 2019-07-29

## 2019-07-29 NOTE — ED PROVIDER NOTES
History  Chief Complaint   Patient presents with    Allergies     Patient took a home pregnany test at home and it came back positive, unsure what to take for her allergies now       30 y o  Female presents with poison ivy to b/l forearms, abdomen and legs unsure of what to take because she is currently pregnant  Notes she just took a test and is unsure of how far along she is  Has tried nothing for rash, does not have OBGYN appointment, has not been taking prenatal vitamins  Denies vaginal bleeding, cramping, dysuria  Prior to Admission Medications   Prescriptions Last Dose Informant Patient Reported? Taking?    Calcium 500-100 MG-UNIT CHEW  Self Yes No   Sig: Chew   Prenatal Vit-Fe Fumarate-FA (PRENATAL MULTIVITAMIN) 28-0 8 MG TABS  Self Yes No   Sig: Take 1 tablet by mouth daily   Vitamin E 100 UNIT/GM CREA  Self Yes No   Sig: Apply topically   acetaminophen (TYLENOL) 325 mg tablet  Self No No   Sig: Take 2 tablets (650 mg total) by mouth every 6 (six) hours as needed for mild pain, headaches or fever   docusate sodium (COLACE) 100 mg capsule  Self No No   Sig: Take 1 capsule (100 mg total) by mouth 2 (two) times a day   folic acid (FOLVITE) 1 mg tablet  Self Yes No   Sig: Take by mouth daily   ibuprofen (MOTRIN) 600 mg tablet  Self No No   Sig: Take 1 tablet (600 mg total) by mouth every 6 (six) hours as needed for mild pain (cramping)   simethicone (MYLICON) 80 mg chewable tablet  Self No No   Sig: Chew 1 tablet (80 mg total) 4 (four) times a day as needed for flatulence   vitamin E, tocopherol, 200 units capsule  Self Yes No   Sig: Take 200 Units by mouth daily      Facility-Administered Medications: None       Past Medical History:   Diagnosis Date    Abnormal Pap smear of cervix     Obese     Varicella     vac       Past Surgical History:   Procedure Laterality Date     SECTION      x2,  (Male)  (Female), Last assessed: 14, per Allscripts    [place]  DELIVERY ONLY N/A 3/19/2018    Procedure: Tyler Arthur () REPEAT;  Surgeon: Abdi Vega MD;  Location: [facility] ;  Service: Obstetrics       Family History   Problem Relation Age of Onset    No Known Problems Mother     No Known Problems Father     No Known Problems Other      I have reviewed and agree with the history as documented  Social History     Tobacco Use    Smoking status: Never Smoker    Smokeless tobacco: Never Used   Substance Use Topics    Alcohol use: No    Drug use: No        Review of Systems   Skin: Positive for rash  All other systems reviewed and are negative  Physical Exam  Physical Exam   Constitutional: She is oriented to person, place, and time  She appears well-developed and well-nourished  HENT:   Head: Normocephalic and atraumatic  Right Ear: External ear normal    Left Ear: External ear normal    Nose: Nose normal    Eyes: Conjunctivae and EOM are normal    Neck: Normal range of motion  Neck supple  Cardiovascular: Intact distal pulses  Pulmonary/Chest: Effort normal    Musculoskeletal: Normal range of motion  Neurological: She is alert and oriented to person, place, and time  Skin: Skin is warm  Capillary refill takes less than 2 seconds  Rash noted  Rash is vesicular  Psychiatric: She has a normal mood and affect  Her behavior is normal  Thought content normal    Nursing note and vitals reviewed        Vital Signs  ED Triage Vitals [19 1540]   Temperature Pulse Respirations Blood Pressure SpO2   98 5 °F (36 9 °C) 69 18 151/85 96 %      Temp Source Heart Rate Source Patient Position - Orthostatic VS BP Location FiO2 (%)   Oral Monitor Lying Right arm --      Pain Score       No Pain           Vitals:    19 1540   BP: 151/85   Pulse: 69   Patient Position - Orthostatic VS: Lying         Visual Acuity      ED Medications  Medications - No data to display    Diagnostic Studies  Results Reviewed     None                 No orders to display Procedures  Procedures       ED Course                               MDM  Number of Diagnoses or Management Options  Allergic dermatitis: new and requires workup  Pregnancy at early stage: new and requires workup  Diagnosis management comments: Exam consistent with poison ivy encouraged pt to use topical and oral benadryl as needed for itching  Will give number for OBGYN encouraged follow up with OBGYN and PCP prn      Disposition  Final diagnoses: Allergic dermatitis   Pregnancy at early stage     Time reflects when diagnosis was documented in both MDM as applicable and the Disposition within this note     Time User Action Codes Description Comment    7/29/2019  4:13 PM Freya Flowers [L23 9] Allergic dermatitis     7/29/2019  4:16 PM Freya Flowers [E42 01] Pregnancy at early stage       ED Disposition     ED Disposition Condition Date/Time Comment    Discharge Stable Mon Jul 29, 2019  4:13 PM [facility] discharge to home/self care              Follow-up Information     Follow up With Specialties Details Why [address] Ob[facility]etric[facility] and Gynecology   [address] [facility]  [phone]            Discharge Medication List as of 7/29/2019  4:16 PM      START taking these medications    Details   diphenhydrAMINE (BENADRYL) 25 mg tablet Take 1 tablet (25 mg total) by mouth every 6 (six) hours, Starting Mon 7/29/2019, Normal      diphenhydrAMINE-zinc acetate (BENADRYL) cream Apply topically 3 (three) times a day as needed for itching, Starting Mon 7/29/2019, Normal      hydrocortisone 1 % cream Apply to affected area 2 times daily, Normal      Prenatal Vit-Fe Fumarate-FA (PRENATAL COMPLETE) 14-0 4 MG TABS Take 1 tablet by mouth daily, Starting Mon 7/29/2019, Normal         CONTINUE these medications which have NOT CHANGED    Details   acetaminophen (TYLENOL) 325 mg tablet Take 2 tablets (650 mg total) by mouth every 6 (six) hours as needed for mild pain, headaches or fever, Starting Thu 3/22/2018, No Print      Calcium 500-100 MG-UNIT CHEW Chew, Historical Med      docusate sodium (COLACE) 100 mg capsule Take 1 capsule (100 mg total) by mouth 2 (two) times a day, Starting Thu 3/22/2018, No Print      folic acid (FOLVITE) 1 mg tablet Take by mouth daily, Historical Med      ibuprofen (MOTRIN) 600 mg tablet Take 1 tablet (600 mg total) by mouth every 6 (six) hours as needed for mild pain (cramping), Starting Thu 3/22/2018, No Print      Prenatal Vit-Fe Fumarate-FA (PRENATAL MULTIVITAMIN) 28-0 8 MG TABS Take 1 tablet by mouth daily, Historical Med      simethicone (MYLICON) 80 mg chewable tablet Chew 1 tablet (80 mg total) 4 (four) times a day as needed for flatulence, Starting Thu 3/22/2018, No Print      Vitamin E 100 UNIT/GM CREA Apply topically, Historical Med      vitamin E, tocopherol, 200 units capsule Take 200 Units by mouth daily, Historical Med           No discharge procedures on file      ED Provider  Electronically Signed by           Jabari Yan PA-C  08/02/19 [id number]

## 2019-09-25 ENCOUNTER — ULTRASOUND (OUTPATIENT)
Dept: OBGYN CLINIC | Facility: CLINIC | Age: 29
End: 2019-09-25

## 2019-09-25 VITALS
HEART RATE: 93 BPM | DIASTOLIC BLOOD PRESSURE: 57 MMHG | HEIGHT: 64 IN | WEIGHT: 225 LBS | BODY MASS INDEX: 38.41 KG/M2 | SYSTOLIC BLOOD PRESSURE: 93 MMHG

## 2019-09-25 DIAGNOSIS — N92.6 MISSED MENSES: Primary | ICD-10-CM

## 2019-09-25 LAB — SL AMB POCT URINE HCG: ABNORMAL

## 2019-09-25 PROCEDURE — 81025 URINE PREGNANCY TEST: CPT | Performed by: OBSTETRICS & GYNECOLOGY

## 2019-09-25 PROCEDURE — 99213 OFFICE O/P EST LOW 20 MIN: CPT | Performed by: OBSTETRICS & GYNECOLOGY

## 2019-09-25 NOTE — PROGRESS NOTES
S: 30 y o y o  [id number] who presents for viability scan with LMP of 19  She is 12 weeks and 4 days by her LMP  She reports no issue  She denies cramping or vaginal bleeding  This is a planned and welcomed pregnancy  Her previous pregnancies were uncomplicated  She has had 3 previous c-sections         O:  Vitals:    19 1432   BP: 93/57   BP Location: Right arm   Patient Position: Sitting   Cuff Size: Standard   Pulse: 93   Weight: 102 kg (225 lb)   Height: 5' 4" (1 626 m)       TVUS: viable, osborne IUP    BPD: 2 23cm, 13w5d  AC: 6 58cm 13w2d  FL: 0 98cm 12w6d       A/P:  #1   IUP at 12 weeks and 4 days by LMP  - YARI by LMP 2020  - Viable pregnancy on TVUS  - Final dating TBD by  dating US; referral given  - Prenatal panel, 1hr GTT and Hgb electrophoresis lab slips given  - RTC in 1 week for nurse intake visit      Fide Vasquez MD  OB/GYN PGY-2  2019  2:38 PM

## 2019-09-25 NOTE — PATIENT INSTRUCTIONS
Un embarazo,   CUIDADO AMBULATORIO:   Lo qué necesita saber sobre el Emmett: Un embarazo normal dura alrededor de 40 semanas  El primer trimestre dura desde hammer último periodo hasta la semana 12 de [place]  El yarely trimestre se extiende desde la semana 13 de hammer embarazo hasta la semana 23  El tercer trimestre se extiende desde la semana 24 de embarazo hasta que nazca hammer bebé  Si usted conoce la fecha de hammer último periodo, hammer médico puede calcular la fecha de nacimiento de hammer bebé  Es posible que usted de a radha a hammer bebé en cualquier momento desde la semana 37 hasta 2 semanas después de la fecha calculada de [place]  Busque atención médica de inmediato si:   · Usted presenta un mamie dolor de jarett que no desaparece  · Usted tiene cambios en la visión nuevos o en aumento, cinthya visión borrosa o con manchas  · Usted tiene inflamación nueva o creciente en hammer cesar o mariangel  · Usted tiene dolor o cólicos en el abdomen o la parte baja de la espalda  · Usted tiene sangrado vaginal   Comuníquese con hammer médico o obstreta si:   · Usted tiene calambres, presión o tensión abdominal     · Usted tiene un cambio en la secreción vaginal     · Usted no puede retener alimentos ni líquidos y está perdiendo [place]  · Usted tiene escalofríos o fiebre  · Usted tiene comezón, ardor o dolor vaginal      · Usted tiene gomez secreción vaginal amarillenta, verdosa, tanya o de [employer]  · Usted tiene dolor o ardor al Ana Paula Cervantes, orina menos de lo habitual o tiene [place] rosada o sanguinolenta  · Usted tiene preguntas o inquietudes acerca de hammer condición o cuidado  Cambios corporales que pueden ocurrir eloisa hammer embarazo:   · Los cambios en los senos  que usted Gamal Aas sensibilidad y cosquilleo eloisa la primera parte de hammer [place]  Los senos se volverán más grandes  Es posible que necesite un sostén con soporte   Es posible que usted yony [facility] secreción delgada y SUNSHINE, conocida cinthya calostro, que sale de safia pezones eloisa el yarely trimestre  El calostro es un líquido que se convertirá en [place] alrededor de 3 días después de usted kesha dado a radha  · Cambios en la piel y estrías  podrían ocurrir eloisa hammer embarazo  Es posible que usted tenga marcas fragoso, conocidas cinthya estrías, en hammer piel  Las [employer] se desvanecen después del [facility]  Utilice crema si hammer piel está seca y con comezón  La piel de hammer cesar, alrededor de los pezones y debajo de hammer ombligo podría oscurecerse  La mayoría del [place], hammer piel Erik [place]ic a hammer color normal después del nacimiento de hammer bebé  · El malestar matutino  consiste en náuseas y vómitos que pueden ocurrir en cualquier momento del día  Evite los alimentos grasosos y picantes  Coma comidas pequeñas eloisa el día en vez de porciones grandes  El jengibre puede ayudar a [employer]  Consulte con hammer médico acerca de otras formas para disminuir las náuseas y el vómito  · Acidez estomacal  puede ser causada por los cambios hormonales eloisa hammer [facility]  El [facility] en crecimiento puede empujar hammer estómago hacia arriba y forzar ácido estomacal a acumularse dentro de hammer esófago  [place] 4 o 5 comidas pequeñas cada día en vez de comidas grandes  Evite los alimentos picantes  Evite comer rosalio antes de irse a la cama  · Estreñimiento  puede desarrollarse eloisa hammer embarazo  Para tratar el estreñimiento, coma alimentos altos en fibra cinthya cereales con fibra, frijoles, frutas, verduras, panes integrales y [place]  Peg [place] de [place] regular y tome suficiente agua  Es posible que hammer médico sugiera un suplemento con fibra para ablandar safia evacuaciones intestinales  Consulte con hammer médico antes de usar cualquier medicamento para disminuir el estreñimiento  · Las hemorroides  son Filemon Kidney grandes en el área rectal  Pueden causar dolor, comezón y sangrado de color caba vivo en hammer recto   Para disminuir el riesgo de hemorroides, prevenga el estreñimiento y no se esfuerce cuando tenga gomez evacuación intestinal  Si usted tiene hemorroides, sumérjase en gomez bañera con agua tibia para aliviar la incomodidad  Consulte con marks médico cómo puede tratar las hemorroides  · Los calambres y la hinchazón en las piernas  pueden ser causados por niveles bajos de calcio o por el peso adicional del Valma Phalen  Eleve safia piernas por encima del nivel de marks corazón para disminuir la hinchazón  Eloisa un calambre en la pierna, estire o de un masaje al [employer] que tiene el calambre  El calor puede ayudar a disminuir el dolor y los espasmos musculares  Aplique calor sobre el músculo por 20 a 30 minutos cada 2 horas por la cantidad de días que se le indique  · Dolor en la espalda  puede ocurrir a medida que marks bebé crece  No esté de pie por largos periodos de tiempo ni levante objetos pesados  Use gomez buena postura mientras esté de pie, se agache o se doble  Use zapatos de tacón bajo con un buen soporte  Descansar puede también ayudarla a aliviar el dolor de espalda  Pregunte a marks médico acerca de ejercicios que usted pueda hacer para fortalecer los músculos de marks espalda  Manténgase saludable eloisa marks embarazo:   · Consuma alimentos saludables y variados  Alimentos saludables incluyen frutas, verduras, panes de adair integral, alimentos lácteos bajos en grasa, frijoles, job magras y pescado  [place] líquidos cinthya se le haya indicado  Pregunte cuánto líquido debe moisés cada día y cuáles líquidos son los más adecuados para usted  Limite el consumo de cafeína a menos de [place] [address]  Limite el consumo de pescado a 2 porciones cada semana  Escoja pescado con concentraciones bajas de trung cinthya atún ligero enlatado, camarón, cangrejo, salmón, bacalao o tilapia  No  coma pescado con concentraciones altas de trung cinthya pez charly, mariah jacques, pargo rayado y tiburón  · [address] [place]    Marks necesidad de ciertas vitaminas y [address], cinthya el ácido fólico, aumenta eloisa el [facility]  Las vitaminas prenatales proporcionan algunas de las vitaminas y minerales adicionales que usted necesita  Las vitaminas prenatales también podrían ayudar a disminuir el riesgo de ciertos defectos de nacimiento  · Pregunte cuánto peso usted debe aumentar eloisa hammer embarazo  Demasiado aumento de peso o muy poco puede ser poco saludable para usted y hammer bebé  · Consulte con hammer médico acerca de hacer ejercicio  El ejercicio moderado puede ayudarla a mantenerse en forma  Hammer médico la ayudará a planear un programa de ejercicios que sea seguro para usted eloisa hammer [facility]  · No fume  Si usted fuma, nunca es demasiado tarde para dejar de hacerlo  Fumar aumenta el riesgo de aborto espontáneo y otros problemas de jeronimo eloisa hammer [facility]  Fumar puede causar que hammer bebé nazca antes de tiempo o que pese menos al nacer  Solicite información a hammer médico si usted necesita ayuda para dejar de fumar  · No consuma alcohol  El alcohol pasa de hammer cuerpo al bebé a través de la placenta  Puede afectar el desarrollo del cerebro de hammer bebé y provocar el síndrome de alcoholismo fetal (SAF)  FAS es un vanessa de condiciones que causan [address], de comportamiento y de crecimiento  · Consulte con hammer médico antes de moisés cualquier medicamento  Muchos medicamentos pueden perjudicar a hammer bebé si usted los eunice [address] [place] [address] Avenue  No tome ningún medicamento, vitaminas, hierbas o suplementos sin marilia consultar con hammer Steffi Yarsanism  use drogas ilegales o de la griggs (cinthya marihuana o cocaína) mientras está embarazada  Consejos de seguridad:   · Evite jacuzzis y saunas  No use un jacuzzi o un sauna mientras usted está embarazada, especialmente eloisa el primer trimestre  Los [employer] y los saunas aumentan la temperatura de hammer bebé y el riesgo de defectos de nacimiento  · Evite la toxoplasmosis    Lynn es Jess infección causada por comer carne cruda o estar cerca del excremento de un courtney infectado  [address] puede causar malformaciones congénitas, aborto espontáneo y Farhan Schein  Lávese las mariangel después de tocar carne cruda  Asegúrese de que la carne esté evelyn cocida antes de comerla  Evite los huevos crudos y la Destinee Patch  Use guantes o pida que alguien la ayude a limpiar la caja de arena del courtney mientras usted Olivia Lovelace  · Consulte con marks médico acerca de viajar  El [address] cómodo para viajar es eloisa el yarely trimestre  Pregunte a marks médico si usted puede viajar después de las 36 semanas  Es posible que no pueda viajar en avión después de las 36 [address]  También le puede recomendar que evite largos viajes por carretera  Programe un control con marks médico u obstetra según lo indicado:  Vaya a todas safia citas prenatales eloisa marks embarazo  Anote safia preguntas para que se acuerde de hacerlas eloisa safia visitas  © 2017 [address] Information is for End User's use only and may not be sold, redistributed or otherwise used for commercial purposes  All illustrations and images included in CareNotes® are the copyrighted property of A D A M , Inc  or Logan Meyer  Esta información es sólo para uso en educación  Marks intención no es darle un consejo médico sobre enfermedades o tratamientos  Colsulte con marks [place] farmacéutico antes de seguir cualquier régimen médico para saber si es seguro y efectivo para usted

## 2019-10-02 ENCOUNTER — INITIAL PRENATAL (OUTPATIENT)
Dept: OBGYN CLINIC | Facility: CLINIC | Age: 29
End: 2019-10-02

## 2019-10-02 VITALS — BODY MASS INDEX: 38.76 KG/M2 | HEIGHT: 64 IN | WEIGHT: 227 LBS

## 2019-10-02 DIAGNOSIS — Z34.92 PREGNANT AND NOT YET DELIVERED IN SECOND TRIMESTER: ICD-10-CM

## 2019-10-02 DIAGNOSIS — O99.212 OBESITY AFFECTING PREGNANCY IN SECOND TRIMESTER: ICD-10-CM

## 2019-10-02 DIAGNOSIS — Z78.9 LANGUAGE BARRIER: ICD-10-CM

## 2019-10-02 DIAGNOSIS — Z3A.13 13 WEEKS GESTATION OF PREGNANCY: Primary | ICD-10-CM

## 2019-10-02 DIAGNOSIS — Z98.891 HISTORY OF C-SECTION: ICD-10-CM

## 2019-10-02 PROCEDURE — 99211 OFF/OP EST MAY X REQ PHY/QHP: CPT

## 2019-10-02 NOTE — LETTER
Dentist Letter    Jagruti Almaraz  1990  [address] [facility] [facility] [address]          10/02/19    We have had several requests from local dentist requesting permission to perform procedures on our patients who are pregnant  We wish to respond with this letter regarding some of the more routine procedures that we have been asked about  The following procedures may be performed on our obstetric patients:   1  Administration of local anesthesia   2  Administration of antibiotics such as PCN, Ampicillin, and Erythromycin  3  Administration of pain medications such as Tylenol, Tylenol with codeine, and if needed Percocet  4  Shielded X-rays    Should you have any questions, please do not hesitate to contact at [phone]          Sincerely,    [facility]  [phone]

## 2019-10-02 NOTE — LETTER
[facility] Letter    April Rosa  1990  [address] [facility]       10/02/19          April Rosa is a patient and under our care in our office  Abena Farai's Estimated Date of Delivery: 04/04/2020  Any questions or concerns feel free to contact our office       Thank you,    [address] [facility],[address]  [id number] [place] [address]/Emmanuel Wetzel 15  [facility]/Segun Diego [address] [place] [address]/[address]  [phone]

## 2019-10-02 NOTE — PATIENT INSTRUCTIONS
Señales eloisa el embarazo: Cuando llamar    1  Sangrado vaginal  2  Dolor abdominal kevin que no desaparece  3  Fiebre (más de 100 4 y no se deisi con Tylenol)  4  Vómitos persistentes que jimenez más de 24 horas  5  dolor de pecho  6  Dolor o ardor al orinar  7  Dolor de jarett severo que no se resuelve con Tylenol  8  Visión borrosa o vinay puntos en hammer visión  9  Hinchazón repentina de hammer cesar o mariangel  10  Enrojecimiento, hinchazón o dolor en gomez pierna  11  Un aumento de peso repentino en pocos días  12  Contar los movimientos fetales del bebé  (después de 28 semanas o el sexto mes de embarazo)  15  Gomez pérdida de líquido acuoso de la vagina: puede ser un chorro, un goteo o gomez humedad continua  14  Después de 20 semanas de embarazo, calambres rítmicos (más de 4 por hora) o menstruales cinthya dolor aCrmen Saucedo / Ni Whatley y Embarazo:  La siguiente lista de medicamentos de [facility]3] generalmente se considera sears de moisés eloisa el Arelia Jimenez  Tenga cuidado de no duplicar los productos que contienen acetaminofén (Tylenol)  Resfriados / dolor de garganta   Robitussin DM - Simple (guaifenesina)   Aerosol nasal salino   Gárgaras de agua salada caliente   Cepacol pastillas para la garganta o enjuague bucal (cetilpiridinio)   Sucrets (hexylresoricinol)    Chyna Matte LA D - o DESCONGESANTE   Claritin (loratadine)   Zrytec (cetirizina)   Allerga (fexofenadina)      Natalya de Florian Ahumada / Frank Macias y molestias:   Tylenol (paracetamol) (acetaminophen)  NO exceda más de 3000 mg de Tylenol en un período de 24 horas        Acidez   Mylanta (hidróxido de aluminio / simeticona, hidróxido de magnesio)   Maalaox (hidróxido de aluminio y [place], hidróxido de magnesio)   Tums (carbonato de calcio)   Riopan (magaldrate)      Estreñimiento   Colace (docusato de sodio)   Surfak (docusato de sodio)   MiraLAX   Supositorios de glicerina   Flota enema (fosfato de sodio y bifosfato de sodio)  Náuseas vómitos   Vitamina B6 (piridoxina): puede moisés 50 mg a la hora de acostarse, 25 mg por la mañana, 25 mg por la tarde   Unisom (doxilamina): se puede usar para las náuseas / vómitos (moo gomez tableta de 25 mg por la mitad)  Puede causar somnolencia  Dormir   Benadryl (difenhidramina): tome 1-2 tabletas según sea necesario antes de acostarse   Tableta Unisom (doxilamina) de 25 mg    Tableta de melatonina de 5 mg: según sea necesario antes de acostarse      En general, la forma genérica de la medicina suele ser más económica que la forma de yosi del [place]  Un embarazo  Lo qué necesita saber sobre el embarazo: Un embarazo normal dura alrededor de 40 semanas  El primer trimestre dura desde hammer último periodo hasta la semana 12 de [place]  El yarely trimestre se extiende desde la semana 13 de hammer embarazo hasta la semana 23  El tercer trimestre se extiende desde la semana 24 de embarazo hasta que nazca hammer bebé  Si usted conoce la fecha de hammer último periodo, hammer médico puede calcular la fecha de nacimiento de hammer bebé  Es posible que usted de a radha a hammer bebé en cualquier momento desde la semana 37 hasta 2 semanas después de la fecha calculada de [place]  Busque atención médica de inmediato si:   · Usted presenta un mamie dolor de jarett que no desaparece  · Usted tiene cambios en la visión nuevos o en aumento, cinthya visión borrosa o con manchas  · Usted tiene inflamación nueva o creciente en hammer cesar o mariangel  · Usted tiene dolor o cólicos en el abdomen o la parte baja de la espalda  · Usted tiene sangrado vaginal   Comuníquese con hammer médico o obstreta si:   · Usted tiene calambres, presión o tensión abdominal   · Usted tiene un cambio en la secreción vaginal   · Usted no puede retener alimentos ni líquidos y está perdiendo [place]  · Usted tiene escalofríos o fiebre    · Usted tiene comezón, ardor o dolor vaginal    · Usted tiene gomez secreción vaginal amarillenta, verdosa, tanya o de [employer]  · Usted tiene dolor o ardor al Lossie Boop, orina menos de lo habitual o tiene [place] rosada o sanguinolenta  · Usted tiene preguntas o inquietudes acerca de hammer condición o cuidado  Cambios corporales que pueden ocurrir eloisa hammer embarazo:   · Los cambios en los senos  que usted Leisa Lolling sensibilidad y cosquilleo eloisa la primera parte de hammer Amilcar Pay  Los senos se volverán más grandes  Es posible que necesite un sostén con soporte  Es posible que usted yony [facility] secreción delgada y SUNSHINE, conocida cinthya calostro, que sale de safia pezones eloisa el yarely trimestre  El calostro es un líquido que se convertirá en [place] alrededor de 3 días después de usted kesha dado a radha  · Cambios en la piel y estrías  podrían ocurrir eloisa hammer embarazo  Es posible que usted tenga marcas fragoso, conocidas cinthya estrías, en hammer piel  Las [employer] se desvanecen después del Amilcar Pay  Utilice crema si hammer piel está seca y con comezón  La piel de hammer cesar, alrededor de los pezones y debajo de hammer ombligo podría oscurecerse  La mayoría del [place], hammer piel Tita Dietz a hammer color normal después del nacimiento de hammer bebé  · El malestar matutino  consiste en náuseas y vómitos que pueden ocurrir en cualquier momento del día  Evite los alimentos grasosos y picantes  Coma comidas pequeñas eloisa el día en vez de porciones grandes  El jengibre puede ayudar a [employer]  Consulte con hammer médico acerca de otras formas para disminuir las náuseas y el vómito  · Acidez estomacal  puede ser causada por los cambios hormonales eloisa hammer Amilcar Pay  El [employer] en crecimiento puede empujar hammer estómago hacia arriba y forzar ácido estomacal a acumularse dentro de hammer esófago  Buras 4 o 5 comidas pequeñas cada día en vez de comidas grandes  Evite los alimentos picantes  Evite comer rosalio antes de irse a la cama  · Estreñimiento  puede desarrollarse eloisa hammer embarazo   Para tratar el estreñimiento, coma alimentos altos en fibra cinthya cereales con fibra, frijoles, frutas, verduras, panes integrales y [place]  Gerardine Tanisha de Kirstie regular y tome suficiente agua  Es posible que hammer médico sugiera un suplemento con fibra para ablandar safia evacuaciones intestinales  Consulte con hammer médico antes de usar cualquier medicamento para disminuir el estreñimiento  · Las hemorroides  son Barbara Punches grandes en el área rectal  Pueden causar dolor, comezón y sangrado de color caba vivo en hammer recto  Para disminuir el riesgo de hemorroides, prevenga el estreñimiento y no se esfuerce cuando tenga gomez evacuación intestinal  Si usted tiene hemorroides, sumérjase en gomez bañera con agua tibia para aliviar la incomodidad  Consulte con hammer médico cómo puede tratar las hemorroides  · Los calambres y la hinchazón en las piernas  pueden ser causados por niveles bajos de calcio o por el peso adicional del [facility]  Eleve safia piernas por encima del nivel de hammer corazón para disminuir la hinchazón  Eloisa un calambre en la pierna, estire o de un masaje al [employer] que tiene el calambre  El calor puede ayudar a disminuir el dolor y los espasmos musculares  Aplique calor sobre el músculo por 20 a 30 minutos cada 2 horas por la cantidad de días que se le indique  · Dolor en la espalda  puede ocurrir a medida que hammer bebé crece  No esté de pie por largos periodos de tiempo ni levante objetos pesados  Use gomez buena postura mientras esté de pie, se agache o se doble  Use zapatos de tacón bajo con un buen soporte  Descansar puede también ayudarla a aliviar el dolor de espalda  Pregunte a hammer médico acerca de ejercicios que usted pueda hacer para fortalecer los músculos de hammer espalda  Manténgase saludable eloisa hammer embarazo:   · Consuma alimentos saludables y variados  Alimentos saludables incluyen frutas, verduras, panes de adair integral, alimentos lácteos bajos en grasa, frijoles, job magras y pescado   [place] líquidos cinthya se [place] indicado  Pregunte cuánto líquido debe moisés cada día y cuáles líquidos son los más adecuados para usted  Limite el consumo de cafeína a menos de [place] [address]  Limite el consumo de pescado a 2 porciones cada semana  Escoja pescado con concentraciones bajas de trung cinthya atún ligero enlatado, camarón, cangrejo, salmón, bacalao o tilapia  No  coma pescado con concentraciones altas de trung cinthya pez charly, caballa gigante, pargo rayado y tiburón  · [id number]  Hammer necesidad de ciertas vitaminas y [facility], cinthya el ácido fólico, aumenta eloisa el [facility]  Las vitaminas prenatales proporcionan algunas de las vitaminas y minerales adicionales que usted necesita  Las vitaminas prenatales también podrían ayudar a disminuir el riesgo de ciertos defectos de nacimiento  · Pregunte cuánto peso usted debe aumentar eloisa hammer embarazo  Demasiado aumento de peso o muy poco puede ser poco saludable para usted y hammer bebé  · Consulte con hammer médico acerca de hacer ejercicio  El ejercicio moderado puede ayudarla a mantenerse en forma  Hammer médico la ayudará a planear un programa de ejercicios que sea seguro para usted eloisa hammer [facility]  · No fume  Si usted fuma, nunca es demasiado tarde para dejar de hacerlo  Fumar aumenta el riesgo de aborto espontáneo y otros problemas de jeronimo eloisa hammer [facility]  Fumar puede causar que hammer bebé nazca antes de tiempo o que pese menos al nacer  Solicite información a hammer médico si usted necesita ayuda para dejar de fumar  · No consuma alcohol  El alcohol pasa de hammer cuerpo al bebé a través de la placenta  Puede afectar el desarrollo del cerebro de hammer bebé y provocar el síndrome de alcoholismo fetal (SAF)  FAS es un vanessa de condiciones que causan [address], de comportamiento y de crecimiento  · Consulte con hammer médico antes de moisés cualquier medicamento    Muchos medicamentos pueden perjudicar a hammer bebé si usted los eunice mientras está embarazada  No tome ningún medicamento, vitaminas, hierbas o suplementos sin marilia consultar con hammer Delbra Reema  use drogas ilegales o de la griggs (cinthya marihuana o cocaína) mientras está embarazada  Consejos de seguridad:   · Evite jacuzzis y saunas  No use un jacuzzi o un sauna mientras usted está embarazada, especialmente eloisa el primer trimestre  Los [facility] y los saunas aumentan la temperatura de hammer bebé y el riesgo de defectos de nacimiento  · Evite la toxoplasmosis  [place] es gomez infección causada por comer carne cruda o estar cerca del excremento de un courtney infectado  [place] puede causar malformaciones congénitas, aborto espontáneo y Farhan Schein  Lávese las mariangel después de tocar carne cruda  Asegúrese de que la carne esté evelyn cocida antes de comerla  Evite los huevos crudos y la Arvil Else  Use guantes o pida que alguien la ayude a limpiar la caja de arena del courtney mientras usted Stacia Caputo  · Consulte con hammer médico acerca de viajar  El [address] cómodo para viajar es eloisa el yarely trimestre  Pregunte a hammer médico si usted puede viajar después de las 36 semanas  Es posible que no pueda viajar en avión después de las 36 [address]  También le puede recomendar que evite largos viajes por carretera  Programe un control con hammer médico u obstetra según lo indicado:  Vaya a todas safia citas prenatales eloisa hammer embarazo  Anote safia preguntas para que se acuerde de hacerlas eloisa safia visitas  [place] y vómito en el embarazo   La náusea y el vómito en el embarazo  pueden suceder a cualquier hora del día  Estos síntomas usualmente comienzan antes de la semana 9 del embarazo y terminan para la semana 14 (yarely trimestre)  Algunas mujeres pueden tener náusea o vómito por un tiempo prolongado  Estos síntomas pueden afectar a algunas mujeres eloisa el embarazo  La náusea y el vómito no dañan a hammer bebé  Estos síntomas pueden dificultarle safia actividades diarias  Busque atención médica de inmediato si:   · Usted presenta signos de deshidratación  Por ejemplo, orina de color amarillo oscuro, boca y labios resecos, piel reseca, latido cardíaco acelerado y orinar menos de lo normal   · Usted tiene dolor abdominal intenso  · Usted se siente demasiado débil o mareado cinthya para ponerse de pie  · Usted nota jesica en hammer vómito o en randa deposiciones  Pregúntele a hammer Vangie Alpha vitaminas y minerales son adecuados para usted  · Usted vomita más de 4 veces en 1 día  · Usted no ha podido retener líquidos en el estómago por más de 1 día  · Usted pierde más de 2 libras  · Usted tiene fiebre  · Randa náuseas y vómito continúan por más de 14 semanas  · Usted tiene preguntas o inquietudes acerca de hammer condición o cuidado  El tratamiento  para la náusea y el vómito en el embarazo generalmente no es necesario  Usted puede [employer] alimentos que come y en randa actividades para ayudar a controlar randa síntomas  Es posible que usted necesite probar varias cosas para determinar qué funciona mejor para usted  Hable con hammer médico si randa síntomas no mejoran con los cambios que se recomiendan a continuación  Es posible que usted necesite vitamina B6 y medicamento si estos cambios no ayudan o si randa síntomas se vuelven graves  Cambios de nutrición que usted puede realizar para controlar la náusea y el vómito:   · Coma porciones pequeñas eloisa el día en vez de 3 comidas con porciones grandes  Es más probable que usted tenga náusea y vómito cuando hammer estómago está vacío  Consuma alimentos bajos en grasa y ricos en proteínas  Ejemplos son Sharma Solum, frijoles, pavo y jefferson sin jose Schneider, cinthya galletas saladas, cereal seco o un sandwich chico antes de [place]  · Coma galletas saladas o pan miah antes de levantarse de hammer cama por la mañana  Levántese de la cama lentamente  Los movimientos repentinos podrían provocarle mareos y [place]     · Consuma alimentos blandos cuando se sienta con náuseas  Ejemplos de alimentos blandos son el pan miah, cereal seco, pasta sin Signa Lorena y sanchez  Otros alimentos blandos incluyen a las [employer], plátanos, gelatina y pretzels  Evite los alimentos condimentados, grasosos y fritos  Evite otros alimentos que le provoquen náuseas  · [place] líquidos que contengan gengibre  [place] refresco de gengibre hecho con gengibre real o té de gengibre hecho con gengibre fresco rallado  Las [employer] o dulces de gengibre también podrían ayudar a aliviar la náusea y el vómito  · [place] líquidos entre alimentos en vez de tomarlos con los alimentos  Espere al menos 30 minutos después de comer para moisés líquidos  [place] cantidades pequeñas de líquidos con frecuencia eloisa el día para evitar la deshidratación  Consulte cuál es la cantidad de líquido que usted debería consumir al día  Otros cambios que usted puede realizar para controlar la náusea y el vómito:   · Evite los olores que la [place]  Los olores margarita podrían provocar que [employer] náuseas y el vómito, o podrían empeorarlo  Camine un poco, prenda un ventilador o trate de dormir con la ventana abierta para respirar aire fresco  Cuando esté cocinando, jo ann las ventanas para eliminar el olor que podría provocarle náuseas  · No se cepille safia dientes inmediatamente después de comer  si eso le provoca náuseas  · Descanse cuando lo necesite  Comience gomez actividad lentamente y vuelva a hammer rutina normal conforme se empiece a sentir mejor  · Hable con hammer médico acerca de las vitaminas prenatales  Las vitaminas prenatales pueden provocar náuseas a algunas mujeres  Trate de tomárselas por la noche o con un bocadillo  Si trinity cambio no le [place], hammer médico podría recomendarle un tipo de vitamina diferente     · No use ningún medicamento, vitamina o suplemento para controlar safia síntomas sin antes consultarlo con hammer médico   Varios medicamentos pueden dañar a hammer bebé que no ha nacido  · El ejercicio de ligero a moderado  podría ayudar a aliviar safia síntomas  También podría ayudarla a dormir mejor por la noche  Pregunte a hammer médico acerca del mejor plan de ejercicio para usted  Dieta para el embarazo   LO QUE NECESITA SABER:   ¿Qué es gomez dieta saludable eloisa el Chayito Earthly? Noemi Ray saludable eloisa el embarazo es un plan alimenticio que proporciona la cantidad de calorías y nutrientes que usted necesita eloisa el embarazo  El cuerpo necesita calorías y nutrientes adicionales para apoyar el desarrollo del bebé  Usted necesita aumentar la cantidad correcta de peso para tener un bebé y un embarazo saludables  Los bebés que nacen con un peso saludable tienen un riesgo fe de ciertos problemas cardíacos al nacer y eloisa hammer jp  Noemi Ray saludable podría ayudarlo a evitar que aumente mucho de [place]  El aumento excesivo de peso le podría provocar problemas eloisa el Chayito Earthly y Shauna  ¿Qué debería evitar eloisa el Chayito Earthly? · Alcohol:  No keena alcohol eloisa el embarazo  El alcohol puede aumentar el riesgo de sufrir un aborto espontáneo (perder el bebé)  El bebé también podría nacer muy pequeño y Merlinda Pavon otros problemas de [place], cinthya problemas de aprendizaje eloisa hammer jp  · Cafeína:  No se conoce cuales son Marnell Bury  Limite el consumo de cafeína para evitar posibles problemas de jeronimo  La cafeína puede encontrase en el café, té, gaseosas, bebidas deportivas y chocolate  · Alimentos que contienen trung:  El trung se encuentra de Kirstie natural en bindu todos los tipos de pescados y mariscos  Algunos tipos de pescados absorben niveles más altos de trung que pueden ser nocivos para un bebé antes de nacer  Consuma solamente pescados y mariscos bajos en trung  Coma un james de 12 onzas a la semana de pescado o [employer] tengan un nivel bajo de trung   Entre éstos se incluyen los [place], el [facility] enlatado en agua, el salmón, el abadejo y el bagre  Coma sólo 6 onzas de atún gore por semana  El atún albacora tiene más trung que el atún [place]  No  coma tiburón, pez charly, caballa ni blanquillo  · Alimentos crudos o poco cocidos:  Usted no debería comer carne, aves, huevos, pescado o mariscos (camarón, cangrejo, langosta) crudos o poco cocidos  Cocine los alimentos sobrantes y listos para comer cinthya los hot dogs hasta que estén evelyn calientes  · Alimentos sin pasteurizar:  Los alimentos sin pasteurizar son aquéllos que no pasaron por el proceso de calentamiento (pasteurización) que destruye la bacteria  Usted no debe tona [place], [place] [address] que no haya sido pasteurizado  Estos [employer], feta, [place], blue y [place]  ¿Qué alimentos se pueden comer eloisa el embarazo? Consuma gomez variedad de alimentos de cada bridger de los grupos que se enumeran abajo  El [place]'s dirá cuántas porciones de cada vanessa usted debería comer diariamente para obtener las calorías suficientes  La cantidad de calorías que usted necesita depende de la actividad diaria, el peso antes del Emmett y Lauren actual  Los médicos dividen el embarazo en 3 periodos de tiempo que se conocen cinthya trimestres  En el primer trimestre, usualmente usted no necesita calorías adicionales  En el yarely y tercer trimestre, la mayoría de las mujeres deberían consumir alrededor de 300 calorías adicionales cada día  · Frutas y verduras:  La mitad del plato debería contener frutas y verduras  · Frutas:  Seleccione frutas frescas, enlatadas o secas tan seguido cinthya sea posible  ¨ 1 taza de [place], picada, cocida o enlatada (enlatada en almíba o 100% de [facility])  ¨ Un durazno, naranja o plátano mike  ¨ ½ taza de fruta seca  ¨ 1 taza de jugo de fruta  ¨ Verduras:  Consuma mas verduras de color harley oscuro, caba o anaranjado  Los vegetales harley oscuro incluyen la fabiocowei [facility] y repollo harley  Ejemplos de verduras de color anaranjado y caba son las zanahorias, el camote, la calabaza de invierno, naranjas y chile caba  ¨ 1 taza de vegetales cocidos o crudos  ¨ 1 taza de jugo de verduras  ¨ 2 tazas de hojas verdes crudas  · Granos:  La mitad de los granos que consume cada día deberían ser granos enteros  ¨ Granos integrales    ¨ ½ taza de arroz integral o sarath cocidos  ¨ 1 taza (1 onza) de cereal seco de grano entero  ¨ 1 rebanada de pan de 100% de grano entero o de hall  ¨ 3 tazas de palomitas de maíz  ¨ Otros granos:    ¨ ½ taza de arroz gore o pasta cocidos  ¨ ½ de un pan inglés  ¨ 1 tortilla pequeña de harina o de maíz  ¨ 1 rosquilla pequeña  · Productos lácteos:  Elija productos lácteos sin grasa o bajos en grasa:  ¨ 1½ onzas de queso firme ([place], suizo, chedar)   ¨ 1 taza (8 onzas) de leche o yogurt sin o bajo en grasa  ¨ 1 taza de yogurt o pudín congelado bajo en grasa  · Carne y otras pérez de proteínas:  Elija job magras y de ave  Hornee, ase y cocine la carne a la hilda en vez de freírla  Incluya gomez variedad de mariscos en lugar de algunas job y aves cada semana  Consuma gomez variedad de alimentos con proteínas:  ¨ ½ onza de ziggy secos (12 almendras, 24 pistachos, 7 mitades de nueces) o 1 cucharada de crema de cacahuate (1 onza)  ¨ ¼ de taza de soya tofu o tempeh (1 onza)  ¨ 1 huevo  ¨ ¼ de taza de frijoles, chícharos o lentejas cocidos (1 onza)  ¨ 1 pechuga de jefferson pequeña o 1 trucha pequeña (alrededor de 3 onzas)  ¨ 1 trozo de salmón (4 a 6 onzas)  ¨ 1 hamburguesa pequeña de carne magra (2 a 3 onzas)  · Grasas:  Limite las grasas saturadas, trans y el colesterol   Estas grasas no son saludables y se encuentran en la manteca, la [facility] york, la margarina de obed y en la grasa animal  Elija grasas saludables cinthya la poliinsaturada y la monoinsaturada:  ¨ 1 cucharada de aceite de can[place], [place], [place], [place] o de soya  ¨ 1 cucharada de margarina suave  ¨ 1 cucharadita de mayonesa  ¨ 2 cucharadas de aderezo para ensaladas  ¨ ½ de un aguacate  ¿Qué suplementos vitamínicos y minerales podría necesitar? El médico le indicará si usted necesita un suplemento y qué tipo debería moisés  Hable con marks médico antes de moisés cualquier otra clase de suplemento, incluyendo los herbales (naturales)  · Vitaminas prenatales:  Consuma gomez variedad de alimentos saludables, aun si usted está tomando vitaminas prenatales  Si olvida moisés la vitamina, no se tome el doble al siguiente día  · Ácido fólico:  Usted necesita al menos 815 mcg de ácido fólico por día antes de embarazarse  El ácido fólico ayuda a formar el cerebro y la médula reilly del bebé en el comienzo del [place]ola [place]  Eloisa el [place], marks necesidad diaria de ácido fólico aumenta a alrededor de 600 mcg  Obtenga diariamente ácido fólico, consumiendo frutas y jugos cítricos, verduras de hojas verdes, hígado o frijoles secos  El ácido fólico también se agrega a ciertos [employer] cereales para el desayuno, los productos de sanchez, las harinas y las pastas  · Lata:  El lata es un mineral que el cuerpo necesita para producir hemoglobina, que es gomez parte de los glóbulos rojos  La hemoglobina ayuda a que la jesica transporte oxígeno de los pulmones al fili del cuerpo  Los alimentos que son [place] [employer] buena domingo de lata son la carne, la carne de ave, pescado, frijoles, espinaca y cereales y panes fortificados  [place] cuerpo absorberá el lata de pérez de carne, si tiene gomez domingo de vitamina C al [employer]  Gertrudis té y café lejos de alimentos fortificados con lata y suplementos de lata  Usted necesita alrededor de 30 miligramos de lata cada día eloisa el embarazo  · Calcio y vitamina D:  Las mujeres que no consumen productos lácteos pueden necesitar un suplemento de calcio y vitamina D  Hable con marks médico sobre suplementos de calcio si no come regularmente buenas pérez de calcio   La cantidad de calcio que usted necesita es de 1,300 mg si tiene entre 14 y 25 años de edad y de 1,000 mg si tiene entre 23 y 48 años de edad  ¿Qué cambios en la dieta podrían ayudar si tengo malestar matutino? El [address] matutino es común [employer] primeros meses de [facility]  Usted podría sentirse con náuseas y vomitar varias veces al día  Para mejorar los síntomas del malestar matutino, coma poco y varias veces en vez de 3 comidas grandes  Los [employer] en carbohidratos cinthya las galletas saladas, el pan miah y la pasta podrían ser mas fáciles de comer  Gertrudis líquidos [employer] comidas en vez de hacerlo con las comidas  ¿Qué cambios en la dieta podrían disminuir el estreñimiento? Carin dieta manolo en fibra puede mejorar los síntomas de estreñimiento  Los cereales integrales para desayunar, los panes integrales y los jugos de [place] pasa son altos en Angie  Mita Setter y verduras crudas, así cinthya los frijoles cocidos también son Melonie Hidden buena domingo de Angie  También podría ayudar el aumentar el consumo de líquidos y realizar actividad física regularmente  Consulte con hammer médico antes de empezar un régimen de ejercicios  ¿Qué cambios en la dieta podrían disminuir la Maria Victoria Last? Para mejorar los síntomas de la Maria Victoria Last, no se acueste después de comer  Cuando se acueste, duerma con la jarett un poco elevada  Coma poco y con frecuencia, en vez de 3 comidas grandes  También podría ser de [employer] evitar la cafeína, el chocolate y las comidas condimentadas  ¿Cómo puedo obtener suficiente calcio si no puedo tolerar los productos lácteos? Si usted no puede beber AT[employer]T o consumir productos lácteos, trate la AT&T sin lactosa o baja en lactosa, o la leche de soya fortificada con calcio  Pregunte a hmamer médico acerca de píldoras que pueda moisés para ayudar a digerir los productos lácteos  Consuma otras bebidas y comidas que estén fortificadas con calcio, cinthya el [place]  ¿Qué otras pautas saludables estrella seguir?    · Vegetarianos y veganos:  Si usted es Shellye Cos, consuma suficiente proteína, vitamina B12 y lata eloisa el embarazo  Algunas pérez de estos nutrientes que no son carne, son los cereales fortificados, la mantequilla de Gaming, productos de soya (tofu y [place] de soya), nueces, granos y legumbres  Estos nutrientes también se [employer] Auto y los productos lácteos  · Antojos:  Usted podría tener antojos de ciertos alimentos eloisa el [facility]  Los alimentos que son altos en calorías, grasa y azúcar, no deben reemplazar a los alimentos que son saludables  Algunas mujeres tienen antojos por sustancias inusuales cinthya el Andrae solorzano almidón para ropa, hielo y KRISTI[place] S  Esta condición se conoce cinthya pica  [place] podría conllevar a problemas de jeronimo cinthya anemia y provocar otros [address] [place] [address] Pkwy  ¿Cuándo estrella comunicarme con mi médico?   · Usted pierde peso sin proponérselo  · Tiene antojos por sustancias cinthya el Andrae solorzano almidón para ropa o hielo  · Usted tiene preguntas o inquietudes acerca de hammer condición o cuidado  Embarazo de la semana 7 a la 10   Qué cambios están ocurriendo en hammer cuerpo:  La hormonas del embarazo podrían provocar que hammer cuerpo pase por varios cambios eloisa esta etapa del [facility]  Es posible que usted se sienta más cansado de lo normal y que tenga cambios de humor, náuseas y vómitos, y timi de [place]  Randa senos podrían sentirse sensibles e inflamados y usted podría orinar con más frecuencia  Busque atención médica de inmediato si:   · Usted tiene dolor o cólicos en el abdomen o la parte baja de la espalda  · Usted tiene sangrado vaginal abundante o coágulos  · Le sale un material que parece tejido o coágulos grandes  Recolecte el material y tráigalo con usted  Pregúntele a hammer Cody Wilfrido vitaminas y minerales son adecuados para usted  · Usted tiene un sangrado leve  · Usted tiene escalofríos o fiebre    · Usted tiene comezón, ardor o dolor vaginal    · Usted tiene gomez secreción vaginal amarillenta, verdosa, atnya o de [employer]  · Usted tiene dolor o ardor al [facility], orina menos de lo habitual o tiene [place] rosada o sanguinolenta  · Usted tiene preguntas o inquietudes acerca de hammer condición o cuidado  Cómo cuidarse en esta etapa de hammer embarazo:   · Controle la náusea y el vómito  Evite los alimentos grasosos y picantes  Coma comidas pequeñas eloisa el día en vez de porciones grandes  El jengibre puede ayudar a [employer]  Consulte con hammer médico acerca de otras formas para disminuir las náuseas y el vómito  · Consuma alimentos saludables y variados  Alimentos saludables incluyen frutas, verduras, panes de adair integral, alimentos lácteos bajos en grasa, frijoles, job magras y pescado  [place] líquidos cinthya se le haya indicado  Pregunte cuánto líquido debe moisés cada día y cuáles líquidos son los más adecuados para usted  Limite el consumo de cafeína a menos de [place] [address]  Limite el consumo de pescado a 2 porciones cada semana  Escoja pescado con concentraciones bajas de trung cinthya atún al natural enlatado, camarón, salmón, bacalao o tilapia  No  coma pescado con concentraciones altas de trung cinthya pez charly, caballa gigante, pargo rayado y tiburón  · [id number] [place]  Hammer necesidad de ciertas vitaminas y [address] [facility], cinthya el ácido fólico, aumenta eloisa el [facility]  Las vitaminas prenatales proporcionan algunas de las vitaminas y minerales adicionales que usted necesita  Las vitaminas prenatales también podrían ayudar a disminuir el riesgo de ciertos defectos de nacimiento  · Pregunte cuánto peso usted debería aumentar cada mes  Demasiado aumento de peso o muy poco puede ser poco saludable para usted y hammer bebé  · No fume  Si usted fuma, nunca es demasiado tarde para dejar de hacerlo  Fumar aumenta el riesgo de aborto espontáneo y otros problemas de jeronimo eloisa hammer [place]   Fumar puede causar que hammer bebé nazca antes de tiempo o que pese menos al nacer  Solicite información a hammer médico si usted necesita ayuda para dejar de fumar  · No consuma alcohol  El alcohol pasa de hammer cuerpo al bebé a través de la placenta  Puede afectar el desarrollo del cerebro de hammer bebé y provocar el síndrome de alcoholismo fetal (SAF)  FAS es un vanessa de condiciones que causan [address], de comportamiento y de crecimiento  · Consulte con hammer médico antes de moisés cualquier medicamento  Muchos medicamentos pueden perjudicar a hammer bebé si usted los eunice [address]  No tome ningún medicamento, vitaminas, hierbas o suplementos sin marilia consultar con hammer Jossie Skeens  use drogas ilegales o de la griggs (cinthya marihuana o cocaína) mientras está embarazada  Consejos de seguridad eloisa el embarazo:   · Evite jacuzzis y saunas  No use un jacuzzi o un sauna mientras usted está embarazada, especialmente eloisa el primer trimestre  Los [facility] y los saunas aumentan la temperatura de hammer bebé y el riesgo de defectos de nacimiento  · Evite la toxoplasmosis  Simla es gomez infección causada por comer carne cruda o estar cerca del excremento de un courtney infectado  Simla puede causar malformaciones congénitas, aborto espontáneo y Farhan Schein  Lávese las mariangel después de tocar carne cruda  Asegúrese de que la carne esté evelyn cocida antes de comerla  Evite los huevos crudos y la Laverda Cosier  Use guantes o pida que alguien la ayude a limpiar la caja de arena del courtney mientras usted Yelena Knows  Cambios que están ocurriendo con hammer bebé:  Para las 10 semanas, hammer bebé medirá alrededor de 2 ½ pulgadas desde la marizol hasta la rabadilla (parte inferior o cóccix del bebé)  Hammer bebé pesa alrededor de ½ onza  Los [employer] del cuerpo, cinthya el cerebro, corazón y pulmones se están formando  Las características faciales de hammer bebé también están comenzando a formarse    Lo que necesita saber acerca del cuidado prenatal: El cuidado prenatal se trata de gomez serie de visitas con hammer médico a lo leia del embarazo  Eloisa las primeras 28 semanas de hammer Chayito Earthly, usted tendrá citas mensuales con hammer médico  El cuidado prenatal puede ayudar a evitar problemas eloisa el Chayito Earthly y Shauna  Hammer médico le hará preguntas acerca de hammer jeronimo y de cualquier embarazo previo que usted haya tenido  Él también le preguntará acerca de cualquier medicamento que esté tomando  Es posible que también necesite alguno de los siguientes tratamientos:  · Gomez prueba de [place]  se realiza para revisar si hammer bushra uterino tiene células anormales  El bushra uterino es la apertura angosta que está en la parte inferior de [place]  El bushra uterino se junta con la parte superior de la vagina  · Un examen pélvico  le permite a hammer médico observar hammer bushra uterino (la parte inferior de hammer [place])  Hammer médico utiliza un espéculo para abrir hammer vagina suavemente  Él examinará el tamaño y forma de hammer útero  · Los análisis de jesica:  podrían realizarse para revisar signos de anemia o el tipo de [place]  Hammer médico también podría ordenarle otros exámenes de jesica para revisar si usted es inmune a ciertas enfermedades cinthya la Hepatitis B  También podría recomendarle un examen del VIH  · Análisis de orina  también podrían realizarse para revisar si hay signos de infección  · Hammer presión arterial y peso  será revisado  El embarazo de la semana 11 a la 14   Cambios que están ocurriendo con hammer bebé: Hammer bebé tiene completamente formadas las uñas de safia mariangel y pies  Ahora hammer latido se puede escuchar  Pregunte a hammer médico si usted puede escuchar el latido cardíaco de hammer bebé  Para la semana 14, hammer bebé mide más de 4 pulgadas desde la punta de la jarett hasta la rabadilla (parte inferior del bebé)  Hammer bebé pesa más de 3 onzas     Lo que necesita saber acerca del cuidado prenatal:  Eloisa las primeras 28 semanas de hammer embarazo, usted tendrá citas mensuales con hammer Marie More prenatal puede ayudar a evitar problemas eloisa [facility] y [place]  Marks médico le revisará marks presión arterial y [place]  Es posible que también necesite alguno de los siguientes tratamientos:  · Un examen de orina  también podría realizarse para revisarle el azúcar y la proteína  Estas son señales de diabetes gestacional o de infección  · Se le pueden ofrecer  pruebas de detección de trastornos genéticos  Estos exámenes de detección revisan el riesgo de marks bebé de trastornos genéticos cinthya el síndrome de [place]  Los exámenes de detección incluyen un examen de jesica y un ultrasonido  · El ritmo cardíaco de marks bebé  será revisado  Vacuna de refuerzo contra la Difteria/tos ferina/tétanos (Por inyección)   Protege contra las infecciones causadas por el tétanos (trismo), la difteria o la pertusis (tos [place])  Esta es gomez vacuna de refuerzo  Farzana(s) : Adacel, Boostrix   Existen muchas otras marcas de Jesús  Trinity medicamento no debe ser usado cuando:   Usted no debe recibir esta vacuna si alguna vez ha tenido gomez reacción alérgica a la vacuna contra el tétanos, la difteria o tos ferina por separado o en combinación  Usted no debe recibir esta vacuna si ha tenido convulsiones, cambios del Avery mental o cualquier otra reacción grave dentro de los 7 días de kesha recibido gomez vacuna contra la tos [place]  Forma de usar trinity medicamento:   Inyectable  · Joneen Fly enfermera u otro médico le administrará trinity medicamento  · Marks médico le recetará marks dosis exacta y le indicará la frecuencia con la que debe administrarse  Trinity medicamento se administra mediante gomez inyección en bridger de safia músculos  · Usted podría recibir otras vacunas al mismo tiempo que reciba Daphnie, kevin en gomez matty distinta del cuerpo  Usted debe recibir las instrucciones para el paciente para todas las vacunas  Coméntele a marks médico o enfermera cualquier pregunta que tenga al respecto    · Galina y [place] instrucciones para el paciente que vienen con el medicamento  Hable con hammer médico o farmacéutico si tiene alguna pregunta  Medicamentos y Eusebio Tire que debe evitar:   Consulte con hammer médico o farmacéutico antes de usar cualquier medicamento, incluyendo los que compra sin receta médica, las vitaminas y los productos herbales  · Asegúrese de informarle a hammer médico si usted está recibiendo un tratamiento o medicamento que debilita hammer sistema inmunológico  Ésto incluye la radioterapia, medicamentos esteroides (cinthya dexametasona, hidrocortisona, metilprednisolona, prednisolona, prednisona, Medrol®), o medicamentos contra el cáncer  Precauciones eloisa el uso de trinity medicamento:   · Asegúrese que hammer médico sepa si usted está embarazada o lactando o sufre de epilepsia, un sistema inmunológico débil o un antecedente de un derrame cerebral  Informe a hammer médico si usted está enfermo o tiene fiebre  · Avísele a hammer médico acerca de cualquier reacción que usted tenga después de kesha recibido gomez vacuna  Ésto incluye desmayos, convulsiones, gomez fiebre que sobrepasa los 40 5º C (105° F), o enrojecimiento o inflamación severa donde se le aplicó la inyección  Dígale a hammer médico si usted tiene antecedentes del síndrome de Guillain-Barré después de kesha recibido gomez vacuna antitetánica  · Llame a hammer médico inmediatamente si usted se desmaya o tiene [employer] vista, entumecimiento o cosquilleo en safia brazos, mariangel o pies o sufre gomez convulsión después de recibir esta vacuna  · Informe a hammer médico si tiene gomez alergia al látex  Las jeringas pueden que contengan caucho de látex natural seco   · Esta vacuna no sirve para tratar [employer]  Si usted tiene Pitney Chun de difteria, tétanos, o tos ferina, necesitará un medicamento para tratar la infección    Efectos secundarios que pueden presentarse eloisa el uso de trinity medicamento:   Consulte inmediatamente con el médico si nota cualquiera de estos efectos secundarios:  · Reacción alérgica: Comezón o ronchas, hinchazón del stephanie o las mariangel, hinchazón u hormigueo en la boca o garganta, opresión en el pecho, dificultad para respirar  · Cambios en la visión  · Fiebre por encima de los 39 4 grados C (105 grados F)  · Desvanecimientos o desmayos  · Pérdida de la sensación, hormigueo o ardor en las mariangel, los brazos, las piernas o los pies  · Convulsiones  · TXU Carisa entumecimiento o debilidad en safia brazos o piernas  · Dolor intenso, enrojecimiento o inflamación donde le aplicaron la inyección  Consulte con el médico si nota los siguientes efectos secundarios menos graves:   · Dolor de jarett  · Dolor moderado, enrojecimiento o inflamación donde le aplicaron la inyección  · [place], vómito, diarrea o dolor de estómago  · Cansancio  Consulte con el médico si nota otros efectos secundarios que yue son causados por trinity medicamento  Llame a hammer médico para consultarle Elaine Hall puede notificar safia efectos secundarios al [facility] al [phone]  Vacuna contra la gripe   La vacuna contra la influenza  es gomez inyección que se aplica para ayudar en la prevención de la influenza (gripe)  La influenza es causada por un virus  El virus se propaga de persona a persona por medio de la tos y los estornudos  Varios tipos de virus causan la influenza  Debido a que los virus [place] con el [place], es necesaria la producción de nuevas vacunas cada año  La vacuna comienza la protección contra la influenza aproximadamente 2 semanas después de recibirla  La vacuna antigripal suele inyectarse en el brazo  Podría aplicarse en hammer muslo  Es posible que le administren gomez vacuna hecha con virus débiles o muertos  Llame al 911 en maria e de presentar lo siguiente:   · Hammer boca y garganta están inflamadas  · Usted tiene sibilancias o dificultad para respirar  · Usted tiene dolor en el pecho o hammer corazón está latiendo más rápido de lo que es normal para usted    · Usted siente que se va a desmayar  Busque atención médica de inmediato si:   · Hammer stephanie está caba o inflamado  · Usted tiene urticaria que se propaga por todo hammer cuerpo  · Usted se siente débil o mareado  Pregúntele a hammer Miya Hefty vitaminas y minerales son adecuados para usted  · Usted tiene [employer], enrojecimiento o inflamación alrededor del área donde le aplicaron la inyección  · Usted tiene preguntas o inquietudes sobre la vacuna contra la influenza  Cuándo ponerse la vacuna contra la influenza:  La vacuna antigripal se ofrece cada año empezando en septiembre u octubre  Se recomienda ser vacunado contra la gripe tan pronto esté disponible  Los [employer] 6 meses y 6 años de edad deben recibir 2 dosis eloisa el primer año después de kesha recibido la vacuna  Las 2 dosis deben recibirse con gomez diferencia de 4 semanas cinthya mínimo  Es mejor si se aplica el mismo tipo de vacuna ambas veces  Luego, el nikki puede recibir 1 dosis [employer]  Los niños de 9 años o mayores deben recibir 1 dosis [employer]  Jolie Binder recibir la vacuna contra la influenza:   · Bebés de 6 meses o más  · Cualquier adulto saludable a quien le gustaría reducir el riesgo de contraer la gripe  · Cualquier persona que vive con, o provee cuidado a niños menores de 5 años de edad   · Personal en el [place] de la jeronimo  · Cualquier persona que viva en centros de convalecencia de leia plazo  · Cualquier persona que sufra de problemas de jeronimo crónicos, cinthya asma, diabetes, o trastornos en la jesica  · Cualquier persona con un sistema inmunitario débil  · Mujeres embarazadas o que están planificando quedar embarazadas eloisa la temporada de la gripe  Yañez Has no deben recibir la vacuna contra la influenza:  Si tiene alergia a los [place], pregúntele a hammer médico si es seguro recibir la inyección contra la influenza  Un médico deberá controlarlo de cerca mientras recibe la vacuna y eloisa gomez hora o más tras haberla recibido   Yañez Has no deben recibir la vacuna contra la influenza:  · Bebés menores de 6 meses   · Cualquier persona que haya sufrido gomez reacción alérgica a la vacuna contra la gripe  · Cualquier persona que esté enferma o tenga fiebre  · Cualquier persona que haya recibido un diagnóstico del síndrome de Guillain-[place] dentro de las primeras 6 semanas de kesha recibido gomez vacuna contra la gripe  · Cualquier persona alérgica al timerosal (trung)  Los riesgos de la vacuna contra la gripe:  La vacuna contra la influenza podría causar síntomas leves, cinthya fiebre, dolor de [place] y timi musculares  También es posible que provoque sensibilidad o enrojecimiento de leve a moderado en el área donde le aplicaron la inyección  El aerosol nasal podría causar fiebre, congestión o flujo nasal, dolor de jarett, timi musculares o vómito  Usted aún podría contraer la gripe después de recibir la vacuna contra la influenza  Si usted es alérgico a los [place], pregunte acerca de gomez vacuna sin huevo  Usted podría tener gomez reacción alérgica a la vacuna  [place] puede poner en peligro hammer jp  Acuda a safia consultas de control con hammer médico según le indicaron  Anote safia preguntas para que se acuerde de hacerlas eloisa safia visitas

## 2019-10-02 NOTE — PROGRESS NOTES
OB INTAKE INTERVIEW  Pt presents for OB intake  [id number]  OB History    Para Term  AB Living   4 3 3 0 0 3   SAB TAB Ectopic Multiple Live Births   0 0 0 0 3      # Outcome Date GA Lbr Ameya/2nd Weight Sex Delivery Anes PTL Lv   4 Current            3 Term 18 39w1d  3175 g (7 lb) F CS-LTranv Spinal N RUTHIE   2 Term 13 40w0d  3260 g (7 lb 3 oz) F CS-LTranv  N RUTHIE   1 Term 11 38w0d  2977 g (6 lb 9 oz) M CS-LTranv EPI N RUTHIE     Hx of  delivery prior to 36 weeks 6 days:  No     Last Menstrual Period:    Patient's last menstrual period was 2019 (exact date)  Ultrasound date: 2019    13 weeks 5 days   13 weeks 2 days  12 weeks 6 days     Estimated Date of Delivery: 2020 (NEEDS DATING ULTRASOUND FOR CONFIRMATION)   confirmed by LMP     H&P visit scheduled  2019 @ 10 am Dr Gumaro Puri pap smear: 2017        Findings; lab pap smear results: no abnormalities    Current Issues:  Constipation :   No  Headaches :   No  Cramping:  No  Spotting :   No  PICA cravings :  No  FOB Involved:  Yes  Planned pregnancy:  No  I have these concerns about this prenatal patient:     Pt arrived to interview with 3year old daughter  She began to cry when she sat down  Pt "feels confused with appointment times and floors in the hospital " Reassured pt she was on time for her appointment and many get lost in hospitals  Offered  after today's visit, declined  Pt walks to her appointments, when  is off work he provids transportation  1  13 weeks gestation of pregnancy  Labs were reprinted and encouraged to complete prior to H&P on Oct  8th  Message was sent to [facility] clerical team for dating ultrasound  Per pt she called and left a message with [facility] office  2  Pregnant and not yet delivered in second trimester    3  Obesity affecting pregnancy in second trimester  Body mass index is 38 96 kg/m²  1 hr glucola ordered     4  History of   C/S x3   Desires tubal ligation with repeat C/S  5  Language barrier  Telugu speaking       Interview education   [facility] Pregnancy Ess[facility] Book reviewed and discussed    Baby and Me Handout   Baby and Me Support [facility] Handout   [facility] Handout   Discussed genetic testing   Prenatal lab work: Scripts printed and given to pt   Influenza vaccine given today: No   Discussed Tdap vaccine  Immunizations:   Immunization History   Administered Date(s) Administered    Influenza Quadrivalent Preservative Free 3 years and older IM 10/25/2017    Tdap 2013, 01/10/2018     Depression Screening Follow-up Plan: Patient's depression screening was negative with an [place] score of  0  Clinically patient does not have depression  No treatment is required     Nurse/Family Partnership- referral placed:  N/A     BMI Counseling: Body mass index is 38 96 kg/m²  Discussed the patient's BMI with her  Tobacco Cessation Counseling: Never smoked  Substance Abuse Screening 4Ps         Presently:  No         Past:   No         Partner:   No         Parents/Family:  No  Infection Screening: Does the pt have a hx of MRSA? No    The patient was oriented to our practice and all questions were answered    Interviewed by: Frank Barrios RN 10/02/19

## 2019-10-02 NOTE — LETTER
Proof of Pregnancy Letter    Misty Montelongo  1990  [address] [facility]        10/02/19      Misty Almeidas is a patient at our facility  Misty Montelongo Estimated Date of Delivery: 04/04/2020  Any questions or concerns, please feel free to contact our office      Sincerely,     [facility]   [address]   [place] [facility]   [phone]

## 2019-10-02 NOTE — LETTER
Work Letter    Israel Todd  1990  [address] [facility] [address]    Dear Israel Todd,      10/02/19        Your employee is a patient at [facility]  We recommend that all pregnant women:    1  Have a well-ventilated workspace  2  Wear low-heeled shoes  3  Work no more than 40 hours per week  4  Have a 15 minute break every 2 hours and at least 30 minutes for a meal break  5  Use good body mechanics by bending at your knees to avoid back strain and lift no more than 20 pounds without assistance  Will need assistance with lifting over 20 lbs  6  Have ready access to bathrooms and water  This is not a disability letter, only recommendations by the OBGYN  She may continue to work until her due date unless medical complications arise  We anticipate she may return to work in 6-8 weeks after delivery       Sincerely,    [facility]

## 2019-10-03 ENCOUNTER — TRANSCRIBE ORDERS (OUTPATIENT)
Dept: LAB | Facility: HOSPITAL | Age: 29
End: 2019-10-03

## 2019-10-03 ENCOUNTER — APPOINTMENT (OUTPATIENT)
Dept: LAB | Facility: HOSPITAL | Age: 29
End: 2019-10-03

## 2019-10-03 DIAGNOSIS — N92.6 MISSED MENSES: ICD-10-CM

## 2019-10-03 LAB
ABO GROUP BLD: NORMAL
BASOPHILS # BLD AUTO: 0.02 THOUSANDS/ΜL (ref 0–0.1)
BASOPHILS NFR BLD AUTO: 0 % (ref 0–1)
BILIRUB UR QL STRIP: NEGATIVE
BLD GP AB SCN SERPL QL: NEGATIVE
CLARITY UR: CLEAR
COLOR UR: YELLOW
EOSINOPHIL # BLD AUTO: 0.1 THOUSAND/ΜL (ref 0–0.61)
EOSINOPHIL NFR BLD AUTO: 2 % (ref 0–6)
ERYTHROCYTE [DISTWIDTH] IN BLOOD BY AUTOMATED COUNT: 12.6 % (ref 11.6–15.1)
GLUCOSE 1H P 50 G GLC PO SERPL-MCNC: 155 MG/DL
GLUCOSE UR STRIP-MCNC: NEGATIVE MG/DL
HBV SURFACE AG SER QL: NORMAL
HCT VFR BLD AUTO: 36.5 % (ref 34.8–46.1)
HGB BLD-MCNC: 12 G/DL (ref 11.5–15.4)
HGB UR QL STRIP.AUTO: NEGATIVE
IMM GRANULOCYTES # BLD AUTO: 0.02 THOUSAND/UL (ref 0–0.2)
IMM GRANULOCYTES NFR BLD AUTO: 0 % (ref 0–2)
KETONES UR STRIP-MCNC: NEGATIVE MG/DL
LEUKOCYTE ESTERASE UR QL STRIP: NEGATIVE
LYMPHOCYTES # BLD AUTO: 1.27 THOUSANDS/ΜL (ref 0.6–4.47)
LYMPHOCYTES NFR BLD AUTO: 21 % (ref 14–44)
MCH RBC QN AUTO: 30.4 PG (ref 26.8–34.3)
MCHC RBC AUTO-ENTMCNC: 32.9 G/DL (ref 31.4–37.4)
MCV RBC AUTO: 92 FL (ref 82–98)
MONOCYTES # BLD AUTO: 0.4 THOUSAND/ΜL (ref 0.17–1.22)
MONOCYTES NFR BLD AUTO: 7 % (ref 4–12)
NEUTROPHILS # BLD AUTO: 4.26 THOUSANDS/ΜL (ref 1.85–7.62)
NEUTS SEG NFR BLD AUTO: 70 % (ref 43–75)
NITRITE UR QL STRIP: NEGATIVE
NRBC BLD AUTO-RTO: 0 /100 WBCS
PH UR STRIP.AUTO: 6 [PH]
PLATELET # BLD AUTO: 191 THOUSANDS/UL (ref 149–390)
PMV BLD AUTO: 10.9 FL (ref 8.9–12.7)
PROT UR STRIP-MCNC: NEGATIVE MG/DL
RBC # BLD AUTO: 3.95 MILLION/UL (ref 3.81–5.12)
RH BLD: POSITIVE
RUBV IGG SERPL IA-ACNC: 170.2 IU/ML
SP GR UR STRIP.AUTO: 1.01 (ref 1–1.03)
SPECIMEN EXPIRATION DATE: NORMAL
UROBILINOGEN UR QL STRIP.AUTO: 0.2 E.U./DL
WBC # BLD AUTO: 6.07 THOUSAND/UL (ref 4.31–10.16)

## 2019-10-03 PROCEDURE — 81003 URINALYSIS AUTO W/O SCOPE: CPT

## 2019-10-03 PROCEDURE — 83020 HEMOGLOBIN ELECTROPHORESIS: CPT

## 2019-10-03 PROCEDURE — 82950 GLUCOSE TEST: CPT

## 2019-10-03 PROCEDURE — 80081 OBSTETRIC PANEL INC HIV TSTG: CPT

## 2019-10-03 PROCEDURE — 36415 COLL VENOUS BLD VENIPUNCTURE: CPT

## 2019-10-03 PROCEDURE — 87086 URINE CULTURE/COLONY COUNT: CPT

## 2019-10-04 ENCOUNTER — ULTRASOUND (OUTPATIENT)
Dept: PERINATAL CARE | Facility: CLINIC | Age: 29
End: 2019-10-04

## 2019-10-04 VITALS
DIASTOLIC BLOOD PRESSURE: 78 MMHG | HEART RATE: 93 BPM | SYSTOLIC BLOOD PRESSURE: 118 MMHG | BODY MASS INDEX: 38.86 KG/M2 | WEIGHT: 227.6 LBS | HEIGHT: 64 IN

## 2019-10-04 DIAGNOSIS — Z3A.13 13 WEEKS GESTATION OF PREGNANCY: ICD-10-CM

## 2019-10-04 DIAGNOSIS — O99.810 ABNORMAL GLUCOSE TOLERANCE TEST (GTT) DURING PREGNANCY, ANTEPARTUM: Primary | ICD-10-CM

## 2019-10-04 DIAGNOSIS — O99.210 MATERNAL MORBID OBESITY, ANTEPARTUM (HCC): ICD-10-CM

## 2019-10-04 DIAGNOSIS — Z36.82 ENCOUNTER FOR (NT) NUCHAL TRANSLUCENCY SCAN: Primary | ICD-10-CM

## 2019-10-04 DIAGNOSIS — O34.219 PREVIOUS CESAREAN SECTION COMPLICATING PREGNANCY: ICD-10-CM

## 2019-10-04 DIAGNOSIS — E66.01 MATERNAL MORBID OBESITY, ANTEPARTUM (HCC): ICD-10-CM

## 2019-10-04 DIAGNOSIS — N92.6 MISSED MENSES: ICD-10-CM

## 2019-10-04 PROBLEM — Z98.891 STATUS POST REPEAT LOW TRANSVERSE CESAREAN SECTION: Status: RESOLVED | Noted: 2018-03-19 | Resolved: 2019-10-04

## 2019-10-04 PROBLEM — F32.A MILD DEPRESSION: Status: RESOLVED | Noted: 2018-01-24 | Resolved: 2019-10-04

## 2019-10-04 LAB
BACTERIA UR CULT: NORMAL
HIV 1+2 AB+HIV1 P24 AG SERPL QL IA: NORMAL
RPR SER QL: NORMAL

## 2019-10-04 PROCEDURE — 76801 OB US < 14 WKS SINGLE FETUS: CPT | Performed by: OBSTETRICS & GYNECOLOGY

## 2019-10-04 PROCEDURE — 99243 OFF/OP CNSLTJ NEW/EST LOW 30: CPT | Performed by: OBSTETRICS & GYNECOLOGY

## 2019-10-04 PROCEDURE — 76813 OB US NUCHAL MEAS 1 GEST: CPT | Performed by: OBSTETRICS & GYNECOLOGY

## 2019-10-04 NOTE — LETTER
October 4, 2019     Kain Flaherty MD  [address] [place] [id number]    Patient: Hubert Molina   YOB: 1990   Date of Visit: 10/4/2019       Dear Dr Alissa Robb:    Thank you for referring Hubert Molina to me for evaluation  Below are my notes for this consultation  If you have questions, please do not hesitate to call me  I look forward to following your patient along with you           Sincerely,        Scottie Zaman MD        CC: No Recipients

## 2019-10-04 NOTE — PROGRESS NOTES
[address] [address]: Ms Feliz Solo was seen today at 13w6d for nuchal translucency ultrasound  See ultrasound report under "OB Procedures" tab  My recommendations are as follows:    1  We reviewed the availability of genetic screening, as well as diagnostic testing, which are available to all pregnant women  We reviewed limitations, risks, and benefits of screening and testing  She does not wish to pursue genetic screening or diagnostic testing at this time  2   A detailed anatomic survey as well as transvaginal cervical length screening are recommended between 18-22 weeks gestation  3  3 prior  deliveries: We reviewed her history of 3 prior  deliveries, which increases the risk of morbidly adherent placenta, as well as adhesive disease and operative complications  We discussed that placenta previa would significantly increase the risk of placental adherence disorder, and I discussed the importance of evaluation of placenta at her level 2 ultrasound, transvaginally if needed  4   Obesity in pregnancy (defined as body mass index > 30 kg/m2) is associated with an increased risk of several pregnancy complications, including hypertensive disorders, diabetes, abnormal fetal growth, fetal malformations  The risk of  delivery is also increased, as are wound complications in the event of  delivery  A healthy diet and exercise, as well as appropriate gestational weight gain (no more than 11-20 pounds) can help reduce risk of these complications  150 minutes of moderate exercise per week is recommended for all pregnant women  Nutrition counseling is also available if desired  Early screening for gestational diabetes is recommended, as well as routine re-screening at 24-28 weeks if early screening results are normal   fetal surveillance is also recommended as follows:BMI 30-40:  Evaluation of fetal growth at 32 weeks gestation  BMI >40: Evaluation of fetal growth at 28, 34 weeks gestation, as well as antepartum fetal surveillance once weekly beginning at 36 weeks gestation      Please don't hesitate to contact our office with any concerns or questions     -Dina Mathew MD

## 2019-10-07 ENCOUNTER — APPOINTMENT (OUTPATIENT)
Dept: LAB | Facility: HOSPITAL | Age: 29
End: 2019-10-07

## 2019-10-07 DIAGNOSIS — O99.810 ABNORMAL GLUCOSE TOLERANCE TEST (GTT) DURING PREGNANCY, ANTEPARTUM: ICD-10-CM

## 2019-10-07 LAB
GLUCOSE 1H P 100 G GLC PO SERPL-MCNC: 166 MG/DL (ref 65–179)
GLUCOSE 2H P 100 G GLC PO SERPL-MCNC: 140 MG/DL (ref 65–154)
GLUCOSE 3H P 100 G GLC PO SERPL-MCNC: 84 MG/DL (ref 65–139)
GLUCOSE P FAST SERPL-MCNC: 72 MG/DL (ref 65–99)

## 2019-10-07 PROCEDURE — 36415 COLL VENOUS BLD VENIPUNCTURE: CPT

## 2019-10-07 PROCEDURE — 82951 GLUCOSE TOLERANCE TEST (GTT): CPT

## 2019-10-07 PROCEDURE — 82952 GTT-ADDED SAMPLES: CPT

## 2019-10-08 ENCOUNTER — ROUTINE PRENATAL (OUTPATIENT)
Dept: OBGYN CLINIC | Facility: CLINIC | Age: 29
End: 2019-10-08

## 2019-10-08 VITALS
WEIGHT: 222 LBS | SYSTOLIC BLOOD PRESSURE: 113 MMHG | BODY MASS INDEX: 37.9 KG/M2 | HEART RATE: 108 BPM | HEIGHT: 64 IN | DIASTOLIC BLOOD PRESSURE: 75 MMHG

## 2019-10-08 DIAGNOSIS — Z72.51 HIGH RISK HETEROSEXUAL BEHAVIOR: Primary | ICD-10-CM

## 2019-10-08 DIAGNOSIS — Z11.3 SCREEN FOR SEXUALLY TRANSMITTED DISEASES: ICD-10-CM

## 2019-10-08 DIAGNOSIS — Z23 NEED FOR INFLUENZA VACCINATION: ICD-10-CM

## 2019-10-08 DIAGNOSIS — Z3A.14 14 WEEKS GESTATION OF PREGNANCY: ICD-10-CM

## 2019-10-08 LAB
DEPRECATED HGB OTHER BLD-IMP: 0 %
HGB A MFR BLD: 2.4 % (ref 1.8–3.2)
HGB A MFR BLD: 97.6 % (ref 96.4–98.8)
HGB C MFR BLD: 0 %
HGB F MFR BLD: 0 % (ref 0–2)
HGB FRACT BLD-IMP: NORMAL
HGB S BLD QL SOLY: NEGATIVE
HGB S MFR BLD: 0 %
SL AMB  POCT GLUCOSE, UA: NORMAL
SL AMB POCT KETONES,UA: NORMAL
SL AMB POCT URINE PROTEIN: NORMAL

## 2019-10-08 PROCEDURE — 87591 N.GONORRHOEAE DNA AMP PROB: CPT | Performed by: OBSTETRICS & GYNECOLOGY

## 2019-10-08 PROCEDURE — 90471 IMMUNIZATION ADMIN: CPT | Performed by: OBSTETRICS & GYNECOLOGY

## 2019-10-08 PROCEDURE — 81002 URINALYSIS NONAUTO W/O SCOPE: CPT | Performed by: OBSTETRICS & GYNECOLOGY

## 2019-10-08 PROCEDURE — 87491 CHLMYD TRACH DNA AMP PROBE: CPT | Performed by: OBSTETRICS & GYNECOLOGY

## 2019-10-08 PROCEDURE — 99213 OFFICE O/P EST LOW 20 MIN: CPT | Performed by: OBSTETRICS & GYNECOLOGY

## 2019-10-08 PROCEDURE — 90686 IIV4 VACC NO PRSV 0.5 ML IM: CPT | Performed by: OBSTETRICS & GYNECOLOGY

## 2019-10-08 RX ORDER — GLUCOSAMINE/D3/BOSWELLIA SERRA 1500MG-400
TABLET ORAL
COMMUNITY

## 2019-10-08 RX ORDER — OMEGA-3-ACID ETHYL ESTERS 1 G/1
2 CAPSULE, LIQUID FILLED ORAL 2 TIMES DAILY
COMMUNITY
End: 2020-03-19

## 2019-10-08 NOTE — PATIENT INSTRUCTIONS
El embarazo de la semana 11 a la 14   CUIDADO AMBULATORIO:   Qué cambios están ocurriendo en hammer cuerpo: Ahora usted está al término del primer trimestre y entrando al yarely trimestre  Los [employer] matutinos por lo general desaparecen para [employer]  Es posible que usted tenga otros síntomas cinthya fatiga, orinar con frecuencia y timi de [place]  Usted podría kesha [employer] 2 a 4 libras hasta ahora  Busque atención médica de inmediato si:   · Usted tiene dolor o cólicos en el abdomen o la parte baja de la espalda  · Usted tiene sangrado vaginal abundante o coágulos  · Le sale un material que parece tejido o coágulos grandes  Recolecte el material y tráigalo con usted  Pregúntele a hammer Starlette Mems vitaminas y minerales son adecuados para usted  · Usted no puede retener alimentos ni líquidos y está perdiendo [place]  · Usted tiene un sangrado leve  · Usted tiene escalofríos o fiebre  · Usted tiene comezón, ardor o dolor vaginal      · Usted tiene gomez secreción vaginal amarillenta, verdosa, tanya o de [employer]  · Usted tiene dolor o ardor al Charlanne Freida, orina menos de lo habitual o tiene [place] rosada o sanguinolenta  · Usted tiene preguntas o inquietudes acerca de hammer condición o cuidado  Cómo cuidarse en esta etapa de hammer embarazo:   · Descanse lo suficiente  Es posible que usted se sienta más cansada de lo normal  Usted podría necesitar moisés siestas o acostarse más temprano  · Controle la náusea y el vómito  Evite los alimentos grasosos y picantes  Coma comidas pequeñas eloisa el día en vez de porciones grandes  El jengibre puede ayudar a [employer]  Consulte con hammer médico acerca de otras formas para disminuir las náuseas y el vómito  · Consuma alimentos saludables y variados  Alimentos saludables incluyen frutas, verduras, panes de adair integral, alimentos lácteos bajos en grasa, frijoles, job magras y pescado  [place] líquidos cinthya se le haya indicado  Pregunte cuánto líquido debe moisés cada día y cuáles líquidos son los más adecuados para usted  Limite el consumo de cafeína a menos de [address]  Limite el consumo de pescado a 2 porciones cada semana  Escoja pescado con concentraciones bajas de trung cinthya atún al natural enlatado, camarón, salmón, bacalao o tilapia  No  coma pescado con concentraciones altas de trung cinthya pez charly, caballa gigante, pargo rayado y tiburón  · [id number] [address]  Hammer necesidad de ciertas vitaminas y [address] [facility], cinthya el ácido fólico, aumenta eloisa el Jammie Host  Las vitaminas prenatales proporcionan algunas de las vitaminas y minerales adicionales que usted necesita  Las vitaminas prenatales también podrían ayudar a disminuir el riesgo de ciertos defectos de nacimiento  · No fume  Si usted fuma, nunca es demasiado tarde para dejar de hacerlo  Fumar aumenta el riesgo de aborto espontáneo y otros problemas de jeronimo eloisa hammer Jammie Host  Fumar puede causar que hammer bebé nazca antes de tiempo o que pese menos al nacer  Solicite información a hammer médico si usted necesita ayuda para dejar de fumar  · No consuma alcohol  El alcohol pasa de hammer cuerpo al bebé a través de la placenta  Puede afectar el desarrollo del cerebro de hammer bebé y provocar el síndrome de alcoholismo fetal (SAF)  FAS es un vanessa de condiciones que causan [address], de comportamiento y de crecimiento  · Consulte con hammer médico antes de moisés cualquier medicamento  Muchos medicamentos pueden perjudicar a hammer bebé si usted los eunice [address]  No tome ningún medicamento, vitaminas, hierbas o suplementos sin marilia consultar con hammer Terri John Paul  use drogas ilegales o de la griggs (cinthya marihuana o cocaína) mientras está embarazada  Consejos de seguridad eloisa el embarazo:   · Evite jacuzzis y saunas    No use un jacuzzi o un sauna mientras usted está embarazada, especialmente eloisa el primer trimestre  Los [facility] y los saunas aumentan la temperatura de hammer bebé y el riesgo de defectos de nacimiento  · Evite la toxoplasmosis  Gambier es gomez infección causada por comer carne cruda o estar cerca del excremento de un courtney infectado  Gambier puede causar malformaciones congénitas, aborto espontáneo y Farhan Schein  Lávese las mariangel después de tocar carne cruda  Asegúrese de que la carne esté evelyn cocida antes de comerla  Evite los huevos crudos y la Mert Froilan  Use guantes o pida que alguien la ayude a limpiar la caja de arena del courtney mientras usted Patsi Blades  Cambios que están ocurriendo con hammer bebé: Hammer bebé tiene completamente formadas las uñas de safia mariangel y pies  Ahora hammer latido se puede escuchar  Pregunte a hammer médico si usted puede escuchar el latido cardíaco de hammer bebé  Para la semana 14, hammer bebé mide más de 4 pulgadas desde la punta de la jarett hasta la rabadilla (parte inferior del bebé)  Hammer bebé pesa más de 3 onzas  Lo que necesita saber acerca del cuidado prenatal:  Eloisa las primeras 29 semanas de hammer embarazo, usted tendrá citas mensuales con hammer médico  El cuidado prenatal puede ayudar a evitar problemas eloisa el Alana Ijeoma y Shauna  Hammer médico le revisará hammer presión arterial y [place]  Es posible que también necesite alguno de los siguientes tratamientos:  · Un examen de orina  también podría realizarse para revisarle el azúcar y la proteína  Estas son señales de diabetes gestacional o de infección  · Se le pueden ofrecer  pruebas de detección de trastornos genéticos  Estos exámenes de detección revisan el riesgo de hammer bebé de trastornos genéticos cinthya el síndrome de [place]  Los exámenes de detección incluyen un examen de jesica y un ultrasonido  · El ritmo cardíaco de hammer bebé  será revisado  © 2017 [address] [employer] is for End User's use only and may not be sold, redistributed or otherwise used for commercial purposes   All illustrations and images included in CareNotes® are the copyrighted property of A DAVE A M , Inc  or Logan Meyer  Esta información es sólo para uso en educación  Hammer intención no es darle un consejo médico sobre enfermedades o tratamientos  Colsulte con hammer Viva Hint farmacéutico antes de seguir cualquier régimen médico para saber si es seguro y efectivo para usted

## 2019-10-08 NOTE — PROGRESS NOTES
13 weeks gestation of pregnancy  2nd trimester precautions reviewed  Reviewed appropriate weight gain expectations extensively, with recommendation for 11-20 pound weight gain given current BMI  Reviewed abnormal early 1 hour glucola, normal 3 hour glucola  Declines genetic screen, flu shot  Pap due , Follow-up GC/CT  Planning RLTCS, ongoing discussion of contraception  RTC 4 weeks routine prenatal care     Abdelrahman Albarado, DO    OB/GYN  PRENATAL H&P VISIT  Nava Maharaj  10/8/2019  12:14 PM     Armenian  Used for Encounter     SUBJECTIVE  Patient is a [id number] at 14w3d here for initial prenatal H&P  Unplanned but desired pregnancy  Without complaints  Voices concern over need for early diabetic screening, states she is anxious to find out results as her blood sugars had never been elevated in prior pregnancies  HX RLTCS x 3  Unsure about permanent sterilization  Denies planned travel  Declines genetic screen  Denies etoh, tobacco, drug use  OB History    Para Term  AB Living   4 3 3 0 0 3   SAB TAB Ectopic Multiple Live Births   0 0 0 0 3      # Outcome Date GA Lbr Ameya/2nd Weight Sex Delivery Anes PTL Lv   4 Current            3 Term 18 39w1d  3175 g (7 lb) F CS-LTranv Spinal N RUTHIE      Name: Florencia Leone  (CIERRA)      Apgar1: 9  Apgar5: 9   2 Term 13 40w0d  3260 g (7 lb 3 oz) F CS-LTranv  N RUTHIE   1 Term 11 38w0d  2977 g (6 lb 9 oz) M CS-LTranv EPI N RUTHIE       Review of Systems   Constitutional: Negative for chills, diaphoresis, fatigue and fever  Respiratory: Negative for cough, choking, chest tightness and shortness of breath  Cardiovascular: Negative for chest pain, palpitations and leg swelling  Gastrointestinal: Negative for abdominal pain, constipation, diarrhea, nausea and vomiting  Genitourinary: Negative for dysuria, hematuria, pelvic pain, vaginal bleeding, vaginal discharge and vaginal pain     Musculoskeletal: Negative for arthralgias, back pain, myalgias and neck pain  Neurological: Negative for dizziness, weakness, light-headedness and headaches  Psychiatric/Behavioral: Negative for self-injury, sleep disturbance and suicidal ideas  The patient is not nervous/anxious          Past Medical History:   Diagnosis Date    Migraine     Obese     Varicella     vac       Past Surgical History:   Procedure Laterality Date     SECTION      x2,  (Male)  (Female), Last assessed: 14, per Allscripts    IN  DELIVERY ONLY N/A 3/19/2018    Procedure:  SECTION () REPEAT;  Surgeon: Whitney Elizabeth MD;  [facility];  Service: Obstetrics       Social History     Socioeconomic History    Marital status: /Civil Union     Spouse name: Not on file    Number of children: 2    Years of education: Not on file    Highest education level: Not on file   Occupational History    Occupation: Houswife or homemaker   Social Needs    Financial resource strain: Not on file    Food insecurity:     Worry: Not on file     Inability: Not on file    Transportation needs:     Medical: Not on file     Non-medical: Not on file   Tobacco Use    Smoking status: Never Smoker    Smokeless tobacco: Never Used   Substance and Sexual Activity    Alcohol use: No    Drug use: No    Sexual activity: Yes     Partners: Male     Birth control/protection: None   Lifestyle    Physical activity:     Days per week: Not on file     Minutes per session: Not on file    Stress: Not on file   Relationships    Social connections:     Talks on phone: Not on file     Gets together: Not on file     Attends Mormonism service: Not on file     Active member of club or organization: Not on file     Attends meetings of clubs or organizations: Not on file     Relationship status: Not on file    Intimate partner violence:     Fear of current or ex partner: Not on file     Emotionally abused: Not on file     Physically abused: Not on file     Forced sexual activity: Not on file   Other Topics Concern    Not on file   Social History Narrative    Not on file       OBJECTIVE  Vitals:    10/08/19 1000   BP: 113/75   Pulse: (!) 108     Physical Exam   Constitutional: She is oriented to person, place, and time  She appears well-developed and well-nourished  No distress  Genitourinary: Vagina normal and uterus normal  There is no rash, tenderness or lesion on the right labia  There is no rash, tenderness or lesion on the left labia  Right adnexum does not display mass, does not display tenderness and does not display fullness  Left adnexum does not display mass, does not display tenderness and does not display fullness  Cervix is not nulliparous  Cervix does not exhibit motion tenderness, discharge or friability  Cardiovascular: Normal rate, regular rhythm, normal heart sounds and intact distal pulses  Pulmonary/Chest: Effort normal and breath sounds normal  No stridor  No respiratory distress  Abdominal: Soft  Bowel sounds are normal  She exhibits no distension  There is no tenderness  Neurological: She is alert and oriented to person, place, and time  Skin: Skin is warm and dry  She is not diaphoretic  Psychiatric: She has a normal mood and affect   Her behavior is normal        30 y o , , with /75   Pulse (!) 108   Ht 5' 4" (1 626 m)   Wt 101 kg (222 lb)   LMP 2019 (Exact Date) ,      by MARJORIE

## 2019-10-08 NOTE — ASSESSMENT & PLAN NOTE
2nd trimester precautions reviewed  Reviewed appropriate weight gain expectations extensively, with recommendation for 11-20 pound weight gain given current BMI  Reviewed abnormal early 1 hour glucola, normal 3 hour glucola  Declines genetic screen, flu shot  Pap due 2020, Follow-up GC/CT  Planning RLTCS, ongoing discussion of contraception  RTC 4 weeks routine prenatal care

## 2019-10-10 LAB
C TRACH DNA SPEC QL NAA+PROBE: NEGATIVE
N GONORRHOEA DNA SPEC QL NAA+PROBE: NEGATIVE

## 2019-10-23 ENCOUNTER — TELEPHONE (OUTPATIENT)
Dept: OBGYN CLINIC | Facility: CLINIC | Age: 29
End: 2019-10-23

## 2019-10-23 NOTE — TELEPHONE ENCOUNTER
----- Message from Eder Tipton sent at 10/15/2019  2:35 PM EDT -----  Regarding: Normal results  Hello :)  Can you please call this pt with normal results , she is Arabic speaking    Thank You

## 2019-11-04 PROBLEM — Z3A.18 18 WEEKS GESTATION OF PREGNANCY: Status: ACTIVE | Noted: 2019-10-04

## 2019-11-08 ENCOUNTER — ROUTINE PRENATAL (OUTPATIENT)
Dept: OBGYN CLINIC | Facility: CLINIC | Age: 29
End: 2019-11-08

## 2019-11-08 VITALS
BODY MASS INDEX: 38.07 KG/M2 | DIASTOLIC BLOOD PRESSURE: 76 MMHG | HEART RATE: 104 BPM | WEIGHT: 223 LBS | SYSTOLIC BLOOD PRESSURE: 136 MMHG | HEIGHT: 64 IN

## 2019-11-08 DIAGNOSIS — O34.219 PREVIOUS CESAREAN SECTION COMPLICATING PREGNANCY: Primary | ICD-10-CM

## 2019-11-08 DIAGNOSIS — O99.210 OBESITY IN PREGNANCY: ICD-10-CM

## 2019-11-08 DIAGNOSIS — Z3A.18 18 WEEKS GESTATION OF PREGNANCY: ICD-10-CM

## 2019-11-08 PROCEDURE — 99213 OFFICE O/P EST LOW 20 MIN: CPT | Performed by: NURSE PRACTITIONER

## 2019-11-08 NOTE — PROGRESS NOTES
Patient is primarily Faroese-speaking A  Verlin Foot at bedside to assist with translation  Denies loss of fluid, vaginal bleeding and abdominal pain  Confirms fetal movement, flutters  Tolerating prenatal vitamin and supplements well  Reviewed options for genetic screening-patient declines  Has level 2 ultrasound scheduled  Early 1 hour Glucola abnormal 155-3 hour WNL  Patient complains of numbness and tingling of fingers intermittently throughout the day  Denies injury, trauma, loss of strength  Plan:  1  Continue prenatal vitamins and supplements daily  2  Early 1 hour elevated -3 hour normal will need 3 hour GTT between 24-28 gestational weeks  3  Level 2 ultrasound as scheduled  4  Carpal tunnel exercises provided  Patient encouraged to stay well hydrated    5  Common discomforts of pregnancy and precautions reviewed  RTO 4 weeks f/u US

## 2019-11-08 NOTE — PATIENT INSTRUCTIONS
El embarazo de la semana 19 a la 22   LO QUE NECESITA SABER:   ¿Qué cambios están ocurriendo en mi cuerpo? Ahora que usted está en hammer yarely trimestre, tiene Phanijamaal  Es posible que también sienta más [place] de lo normal  Es posible que usted esté aumentando de ½ a 1 ming por [place], y que hammer embarazo se empiece a notar  Posiblemente usted necesite comenzar a usar ropa de maternidad  Conforme hammer bebé está creciendo, usted podría presentar otros síntomas  Estos podrían incluir dolor corporal o estrías en hammer abdomen, senos, muslos y glúteos  ¿Cómo me estrella cuidar en esta etapa de mi embarazo? · Consuma alimentos saludables y variados  Alimentos saludables incluyen frutas, verduras, panes de adair integral, alimentos lácteos bajos en grasa, frijoles, job magras y pescado  [place] líquidos cinthya se le haya indicado  Pregunte cuánto líquido debe moisés cada día y cuáles líquidos son los más adecuados para usted  Limite el consumo de cafeína a menos de [address]  Limite el consumo de pescado a 2 porciones cada semana  Escoja pescado con concentraciones bajas de trung cinthya atún al natural enlatado, camarón, salmón, bacalao o tilapia  No  coma pescado con concentraciones altas de trung cinthya pez charly, caballa gigante, pargo rayado y tiburón  · [address]  Hammer necesidad de ciertas vitaminas y [facility], cinthya el ácido fólico, aumenta eloisa el [facility]  Las vitaminas prenatales proporcionan algunas de las vitaminas y minerales adicionales que usted necesita  Las vitaminas prenatales también podrían ayudar a disminuir el riesgo de ciertos defectos de nacimiento  · Consulte con hammer médico acerca de hacer ejercicio  El ejercicio moderado puede ayudarla a mantenerse en forma  Hammer médico la ayudará a planear un programa de ejercicios que sea seguro para usted eloisa hammer [place]  · No fume    Si usted fuma, nunca es demasiado tarde para dejar de hacerlo  Fumar aumenta el riesgo de aborto espontáneo y otros problemas de jeronimo eloisa hammer [place]  Fumar puede causar que hammer bebé nazca antes de tiempo o que pese menos al nacer  Solicite información a hammer médico si usted necesita ayuda para dejar de fumar  · No consuma alcohol  El alcohol pasa de hammer cuerpo al bebé a través de la placenta  Puede afectar el desarrollo del cerebro de hammer bebé y provocar el síndrome de alcoholismo fetal (SAF)  FAS es un vanessa de condiciones que causan [address], de comportamiento y de crecimiento  · Consulte con hammer médico antes de moisés cualquier medicamento  Muchos medicamentos pueden perjudicar a hammer bebé si usted los eunice [facility] [address]  No tome ningún medicamento, vitaminas, hierbas o suplementos sin marilia consultar con hammer Shahid Sciara  use drogas ilegales o de la griggs (cinthya marihuana o cocaína) mientras está embarazada  ¿Cuáles son Robby Jeferson consejos de seguridad eloisa el embarazo? · Evite jacuzzis y saunas  No use un jacuzzi o un sauna mientras usted está embarazada, especialmente eloisa el primer trimestre  [facility] y los saunas aumentan la temperatura de hammer bebé y el riesgo de defectos de nacimiento  · Evite la toxoplasmosis  [place] es gomez infección causada por comer carne cruda o estar cerca del excremento de un courtney infectado  [place] puede causar malformaciones congénitas, aborto espontáneo y Farhan Schein  Lávese las mariangel después de tocar carne cruda  Asegúrese de que la carne esté evelyn cocida antes de comerla  Evite los huevos crudos y la [place]  Use guantes o pida que alguien la ayude a limpiar la caja de arena del courtney mientras usted Barb Melinda  ¿Qué cambios están ocurriendo con mi bebé? Para las 22 semanas, hammer bebé mide alrededor de 8 pulgadas de leia desde la punta de la jarett hasta la rabadilla (parte inferior del bebé)  Hammer bebé también pesa alrededor de 1 ming   Hammer bebé se está volviendo [facility] activo  Es posible que pueda sentir a hammer bebé moviéndose dentro de usted para [place]  Podría ser que los primeros movimientos no mark tan notorios  Los movimientos podrían sentirse cinthya gomez sensación de revoloteo  Conforme pasa el tiempo, los movimientos de hammer bebé podrían volverse más margarita y notorios  ¿Qué necesito saber acerca del cuidado prenatal?  Eloisa las primeras 29 semanas de hammer embarazo, usted tendrá citas mensuales con hammer médico  Hammer médico le revisará hammer presión arterial y peso  Es posible que también necesite lo siguiente:  · Un examen de orina  también podría realizarse para revisarle el azúcar y la proteína  Estas son señales de diabetes gestacional o de infección  La proteína en hammer orina también podría ser gomez señal de preeclampsia  La preeclampsia es gomez condición que puede desarrollarse eloisa la semana 21 o después en hammer embarazo  Esta provoca presión arterial manolo y [employer] con safia riñones y [place]  · La altura uterina  es gomez medición del útero para controlar el desarrollo de hammer bebé  [employer] por lo general es igual al número de [address] que usted tiene de embarazo  · [place] ecografía del feto  [place] imágenes del bebé dentro de hammer Zeb Ee  Muestra el desarrollo de hammer bebé  El movimiento y posición del bebé también se pueden observar  Es posible que hammer médico pueda decirle cuál es el sexo de hammer bebé eloisa la ecografía  · El ritmo cardíaco de hammer bebé  será revisado  ¿Cuándo estrella buscar atención inmediata? · Usted presenta un mamie dolor de jarett que no desaparece  · Usted tiene cambios en la visión nuevos o en aumento, cinthya visión borrosa o con manchas  · Usted tiene inflamación nueva o creciente en ahmmer cesar o mariangel  · Usted tiene manchado o sangrado vaginal     · Usted rompe domnigo o siente un chorro o gotas de agua tibia que le está bajando por hammer vagina    ¿Cuándo estrella comunicarme con mi médico?   · Usted tiene calambres, presión o tensión abdominal     · Usted tiene un cambio en la secreción vaginal     · Usted no puede retener alimentos ni líquidos y está perdiendo [place]  · Usted tiene escalofríos o fiebre  · Usted tiene comezón, ardor o dolor vaginal      · Usted tiene gomez secreción vaginal amarillenta, verdosa, tanya o de [employer]  · Usted tiene dolor o ardor al [place], orina menos de lo habitual o tiene [place] rosada o sanguinolenta  · Usted tiene preguntas o inquietudes acerca de branch condición o cuidado  ACUERDOS SOBRE BRANCH CUIDADO:   Usted tiene el derecho de ayudar a planear branch cuidado  Aprenda todo lo que pueda sobre branch condición y cinthya darle tratamiento  Discuta safia opciones de tratamiento con safia médicos para decidir el cuidado que usted desea recibir  Usted siempre tiene el derecho de rechazar el tratamiento  Esta información es sólo para uso en educación  Branch intención no es darle un consejo médico sobre enfermedades o tratamientos  Colsulte con branch Argelia Robel farmacéutico antes de seguir cualquier régimen médico para saber si es seguro y efectivo para usted  © 2017 [address]  Information is for End User's use only and may not be sold, redistributed or otherwise used for commercial purposes  All illustrations and images included in CareNotes® are the copyrighted property of A D A M , Inc  or Logan Meyer  Ejercicios para el síndrome del túnel carpiano   LO QUE NECESITA SABER:   ¿Qué necesito saber sobre los ejercicios para síndrome del túnel carpiano? Los ejercicios para el síndrome del túnel carpiano podrían ayudar a aliviar el dolor y aumentar branch arco de movilidad  Branch médico o terapeuta físico le puede indicar con que frecuencia necesita hacer los ejercicios  ¿Qué ejercicios puedo hacer? · Extensión de los dedos:  Junte el pulgar y el fili de safia dedos  Manténgalos rectos  Coloque gomez goma elástica alredishmael [employer] dedos y pulgar  Separe los dedos   Yaniv Marquez lentamente sin permitir que la goma elástica se caiga  Repita 40 veces  · Estiramiento del flexor de la serenity:  Mantenga el Nii Fess recto  Agarre safia dedos con la otra mano  Lentamente doble los dedos hacia atrás (con la graf hacia fuera) hasta que sienta un estiramiento en la [place] [address]  Sostenga está posición por 10 segundos  Repita 5 veces  · Estiramiento del extensor de la serenity:  Mantenga el Nii Fess recto  Agarre safia dedos con la otra mano  Lentamente doble los dedos y la mano hacia abajo (con la graf hacia usted) hasta que siente el estiramiento encima de la [place]  Sostenga está posición por 10 segundos  Repita 5 veces  · Rotación de la serenity sin usar pesas:  Siéntese en gomez silla con branch antebrazo apoyado sobre branch muslo o sobre gomez stephens  Con la graf de branch mano hacia abajo, flexione la [place] [address] 3 pulgadas hacia arriba y luego bájela lentamente  [place] el antebrazo y repita con la graf [place] arriba  Celestino cada ejercicio 20 veces  · Encogimiento de hombros:  Párese con los brazos a los lados  Levante safia hombros hacia safia oídos y sosténgalos eloisa 1 yarely  Luego mueva safia hombros hacia atrás, cinthya si fuera a juntar los omóplatos  Mantenga esta posición eloisa 1 yarely  Luego relaje los hombros  Repita 20 veces  ACUERDOS SOBRE BRANCH CUIDADO:   Usted tiene el derecho de ayudar a planear branch cuidado  Aprenda todo lo que pueda sobre branch condición y cinthya darle tratamiento  Discuta safia opciones de tratamiento con safia médicos para decidir el cuidado que usted desea recibir  Usted siempre tiene el derecho de rechazar el tratamiento  Esta información es sólo para uso en educación  Branch intención no es darle un consejo médico sobre enfermedades o tratamientos  Colsulte con branch Nato Older farmacéutico antes de seguir cualquier régimen médico para saber si es seguro y efectivo para usted    © 2017 [address] Information is for End User's use only and may not be sold, redistributed or otherwise used for commercial purposes  All illustrations and images included in CareNotes® are the copyrighted property of A D A M , Inc  or Logan Meyer

## 2019-12-04 ENCOUNTER — ROUTINE PRENATAL (OUTPATIENT)
Dept: PERINATAL CARE | Facility: CLINIC | Age: 29
End: 2019-12-04
Payer: COMMERCIAL

## 2019-12-04 VITALS
WEIGHT: 228.2 LBS | HEIGHT: 64 IN | DIASTOLIC BLOOD PRESSURE: 72 MMHG | HEART RATE: 92 BPM | SYSTOLIC BLOOD PRESSURE: 105 MMHG | BODY MASS INDEX: 38.96 KG/M2

## 2019-12-04 DIAGNOSIS — E66.01 MATERNAL MORBID OBESITY, ANTEPARTUM (HCC): Primary | ICD-10-CM

## 2019-12-04 DIAGNOSIS — Z36.86 ENCOUNTER FOR ANTENATAL SCREENING FOR CERVICAL LENGTH: ICD-10-CM

## 2019-12-04 DIAGNOSIS — Z3A.22 22 WEEKS GESTATION OF PREGNANCY: ICD-10-CM

## 2019-12-04 DIAGNOSIS — O99.210 MATERNAL MORBID OBESITY, ANTEPARTUM (HCC): Primary | ICD-10-CM

## 2019-12-04 DIAGNOSIS — O34.219 PREVIOUS CESAREAN SECTION COMPLICATING PREGNANCY: ICD-10-CM

## 2019-12-04 PROCEDURE — 76817 TRANSVAGINAL US OBSTETRIC: CPT | Performed by: OBSTETRICS & GYNECOLOGY

## 2019-12-04 PROCEDURE — 76811 OB US DETAILED SNGL FETUS: CPT | Performed by: OBSTETRICS & GYNECOLOGY

## 2019-12-04 NOTE — LETTER
December 4, 2019     Henri Cole, [address]    Patient: Angie Muñoz   YOB: 1990   Date of Visit: 12/4/2019       Dear Dr Marley Cook: Thank you for referring Angie Muñoz to me for evaluation  Below are my notes for this consultation  If you have questions, please do not hesitate to call me  I look forward to following your patient along with you  Sincerely,        Severiano Hughs, MD        CC: No Recipients  Severiano Hughs, MD  12/4/2019  8:23 PM  Sign at close encounter    Please refer to the [facility] ultrasound report in Ob Procedures for additional information regarding the visit to the [facility]  today    Severiano Hughs, MD

## 2019-12-04 NOTE — PROGRESS NOTES
A transvaginal ultrasound was performed  Sonographer note on use of High Level Disinfection Process (Trophon) for transvaginal probe# 4 used, serial [id number]    [id number] Melia

## 2019-12-05 NOTE — PROGRESS NOTES
Please refer to the [facility] ultrasound report in Ob Procedures for additional information regarding the visit to the [facility]  today    Trung March MD

## 2019-12-06 ENCOUNTER — ROUTINE PRENATAL (OUTPATIENT)
Dept: OBGYN CLINIC | Facility: CLINIC | Age: 29
End: 2019-12-06

## 2019-12-06 VITALS
DIASTOLIC BLOOD PRESSURE: 66 MMHG | HEIGHT: 64 IN | HEART RATE: 106 BPM | SYSTOLIC BLOOD PRESSURE: 100 MMHG | WEIGHT: 227 LBS | BODY MASS INDEX: 38.76 KG/M2

## 2019-12-06 DIAGNOSIS — O34.219 PREVIOUS CESAREAN SECTION COMPLICATING PREGNANCY: Primary | ICD-10-CM

## 2019-12-06 DIAGNOSIS — Z3A.22 22 WEEKS GESTATION OF PREGNANCY: ICD-10-CM

## 2019-12-06 DIAGNOSIS — O99.212 OBESITY AFFECTING PREGNANCY IN SECOND TRIMESTER: ICD-10-CM

## 2019-12-06 PROCEDURE — 99213 OFFICE O/P EST LOW 20 MIN: CPT | Performed by: NURSE PRACTITIONER

## 2019-12-06 NOTE — PATIENT INSTRUCTIONS
El embarazo de la semana 19 a la 22   LO QUE NECESITA SABER:   ¿Qué cambios están ocurriendo en mi cuerpo? Ahora que usted está en hammer yarely trimestre, tiene Phanijamaal  Es posible que también sienta más [place] de lo normal  Es posible que usted esté aumentando de ½ a 1 ming por [place], y que hammer embarazo se empiece a notar  Posiblemente usted necesite comenzar a usar ropa de maternidad  Conforme hammer bebé está creciendo, usted podría presentar otros síntomas  Estos podrían incluir dolor corporal o estrías en hammer abdomen, senos, muslos y glúteos  ¿Cómo me estrella cuidar en esta etapa de mi embarazo? · Consuma alimentos saludables y variados  Alimentos saludables incluyen frutas, verduras, panes de adair integral, alimentos lácteos bajos en grasa, frijoles, job magras y pescado  [place] líquidos cinthya se le haya indicado  Pregunte cuánto líquido debe moisés cada día y cuáles líquidos son los más adecuados para usted  Limite el consumo de cafeína a menos de [address]  Limite el consumo de pescado a 2 porciones cada semana  Escoja pescado con concentraciones bajas de trung cinthya atún al natural enlatado, camarón, salmón, bacalao o tilapia  No  coma pescado con concentraciones altas de trung cinthya pez charly, caballa gigante, pargo rayado y tiburón  · [id number] [place]  Hammer necesidad de ciertas vitaminas y [facility], cinthya el ácido fólico, aumenta eloisa el [facility]  Las vitaminas prenatales proporcionan algunas de las vitaminas y minerales adicionales que usted necesita  Las vitaminas prenatales también podrían ayudar a disminuir el riesgo de ciertos defectos de nacimiento  · Consulte con hammer médico acerca de hacer ejercicio  El ejercicio moderado puede ayudarla a mantenerse en forma  Hammer médico la ayudará a planear un programa de ejercicios que sea seguro para usted eloisa hammer [place]  · No fume    Si usted fuma, nunca es demasiado tarde para dejar de hacerlo  Fumar aumenta el riesgo de aborto espontáneo y otros problemas de jeronimo eloisa hammer [place]  Fumar puede causar que hammer bebé nazca antes de tiempo o que pese menos al nacer  Solicite información a hammer médico si usted necesita ayuda para dejar de fumar  · No consuma alcohol  El alcohol pasa de hammer cuerpo al bebé a través de la placenta  Puede afectar el desarrollo del cerebro de hammer bebé y provocar el síndrome de alcoholismo fetal (SAF)  FAS es un vanessa de condiciones que causan [address], de comportamiento y de crecimiento  · Consulte con hammer médico antes de moisés cualquier medicamento  Muchos medicamentos pueden perjudicar a hammer bebé si usted los eunice [address] [place] [address]  No tome ningún medicamento, vitaminas, hierbas o suplementos sin marilia consultar con hammer Luray Sages  use drogas ilegales o de la griggs (cinthya marihuana o cocaína) mientras está embarazada  ¿Cuáles son [facility] consejos de seguridad eloisa el embarazo? · Evite jacuzzis y saunas  No use un jacuzzi o un sauna mientras usted está embarazada, especialmente eloisa el primer trimestre  Los [facility] y los saunas aumentan la temperatura de hammer bebé y el riesgo de defectos de nacimiento  · Evite la toxoplasmosis  [place] es gomez infección causada por comer carne cruda o estar cerca del excremento de un courtney infectado  [place] puede causar malformaciones congénitas, aborto espontáneo y Farhan Smallese las mariangel después de tocar carne cruda  Asegúrese de que la carne esté evelyn cocida antes de comerla  Evite los huevos crudos y la Carmen Naseem  Use guantes o pida que alguien la ayude a limpiar la caja de arena del courtney mientras usted Davee Candido  ¿Qué cambios están ocurriendo con mi bebé? Para las 22 semanas, hammer bebé mide alrededor de 8 pulgadas de leia desde la punta de la jarett hasta la rabadilla (parte inferior del bebé)  Hammer bebé también pesa alrededor de 1 ming   Hammer bebé se está volviendo [facility] activo  Es posible que pueda sentir a hammer bebé moviéndose dentro de usted para [employer]  Podría ser que los primeros movimientos no mark tan notorios  Los movimientos podrían sentirse cinthya gomez sensación de revoloteo  Conforme pasa el tiempo, los movimientos de hammer bebé podrían volverse más margarita y notorios  ¿Qué necesito saber acerca del cuidado prenatal?  Eloisa las primeras 29 semanas de hammer embarazo, usted tendrá citas mensuales con hammer médico  Hammer médico le revisará hammer presión arterial y peso  Es posible que también necesite lo siguiente:  · Un examen de orina  también podría realizarse para revisarle el azúcar y la proteína  Estas son señales de diabetes gestacional o de infección  La proteína en hammer orina también podría ser gomez señal de preeclampsia  La preeclampsia es gomez condición que puede desarrollarse eloisa la semana 21 o después en hammer embarazo  Esta provoca presión arterial manolo y [employer] con safia riñones y [place]  · La altura uterina  es gomez medición del útero para controlar el desarrollo de hammer bebé  [employer] por lo general es igual al número de [address] que usted tiene de embarazo  · [place] ecografía del feto  [place] imágenes del bebé dentro de hammer Ashley [place]  Muestra el desarrollo de hammer bebé  El movimiento y posición del bebé también se pueden observar  Es posible que hammer médico pueda decirle cuál es el sexo de hammer bebé eloisa la ecografía  · El ritmo cardíaco de hammer bebé  será revisado  ¿Cuándo estrella buscar atención inmediata? · Usted presenta un mamie dolor de jarett que no desaparece  · Usted tiene cambios en la visión nuevos o en aumento, cinthya visión borrosa o con manchas  · Usted tiene inflamación nueva o creciente en hammer cesar o mariangel  · Usted tiene manchado o sangrado vaginal     · Usted rompe domingo o siente un chorro o gotas de agua tibia que le está bajando por hammer vagina    ¿Cuándo estrella comunicarme con mi médico?   · Usted tiene calambres, presión o tensión abdominal     · Usted tiene un cambio en la secreción vaginal     · Usted no puede retener alimentos ni líquidos y está perdiendo [place]  · Usted tiene escalofríos o fiebre  · Usted tiene comezón, ardor o dolor vaginal      · Usted tiene gomez secreción vaginal amarillenta, verdosa, tanya o de [employer]  · Usted tiene dolor o ardor al [employer] [place], orina menos de lo habitual o tiene [place] rosada o sanguinolenta  · Usted tiene preguntas o inquietudes acerca de branch condición o cuidado  ACUERDOS SOBRE BRANCH CUIDADO:   Usted tiene el derecho de ayudar a planear branch cuidado  Aprenda todo lo que pueda sobre branch condición y cinthya darle tratamiento  Discuta safia opciones de tratamiento con safia médicos para decidir el cuidado que usted desea recibir  Usted siempre tiene el derecho de rechazar el tratamiento  Esta información es sólo para uso en educación  Branch intención no es darle un consejo médico sobre enfermedades o tratamientos  Colsulte con branch Ronalee Mon farmacéutico antes de seguir cualquier régimen médico para saber si es seguro y efectivo para usted  © 2017 [address] Information is for End User's use only and may not be sold, redistributed or otherwise used for commercial purposes  All illustrations and images included in CareNotes® are the copyrighted property of A D A M , Inc  or Logan Meyer

## 2019-12-06 NOTE — PROGRESS NOTES
Patient is British speaking - JORGE Nuñez at bedside to assist with translation  Denies loss fluid, vaginal bleeding and abdominal pain  Confirms frequent fetal movement  Tolerating prenatal vitamin supplements well  Numbness tingling of fingers have improved  No questions or concerns today's visit  Her   Center ultrasound reviewed-19-vertex presentation, normal appearing fetal growth, posterior placenta, no placenta previa, BREANN-WNL and EFW 608g/1 lb 5 oz (57%)  Recommendations for repeat ultrasound in 10 weeks  Patient states she has this scheduled  Plan:  1  Continue prenatal vitamins and supplements daily  2  Follow-up  Center as scheduled  3  Obesity in pregnancy-no weight gain  4  Common discomforts of pregnancy and precautions including  labor reviewed  RTO 4 weeks

## 2020-01-03 ENCOUNTER — ROUTINE PRENATAL (OUTPATIENT)
Dept: OBGYN CLINIC | Facility: CLINIC | Age: 30
End: 2020-01-03

## 2020-01-03 VITALS
DIASTOLIC BLOOD PRESSURE: 76 MMHG | HEIGHT: 61 IN | SYSTOLIC BLOOD PRESSURE: 109 MMHG | BODY MASS INDEX: 43.23 KG/M2 | HEART RATE: 101 BPM | WEIGHT: 229 LBS

## 2020-01-03 DIAGNOSIS — Z3A.26 26 WEEKS GESTATION OF PREGNANCY: Primary | ICD-10-CM

## 2020-01-03 DIAGNOSIS — Z23 NEED FOR TDAP VACCINATION: ICD-10-CM

## 2020-01-03 PROCEDURE — 90715 TDAP VACCINE 7 YRS/> IM: CPT | Performed by: OBSTETRICS & GYNECOLOGY

## 2020-01-03 PROCEDURE — 99213 OFFICE O/P EST LOW 20 MIN: CPT | Performed by: OBSTETRICS & GYNECOLOGY

## 2020-01-03 PROCEDURE — 90471 IMMUNIZATION ADMIN: CPT | Performed by: OBSTETRICS & GYNECOLOGY

## 2020-01-03 NOTE — PROGRESS NOTES
PRENATAL VISIT  26-28 WEEKS  Abena Samson Fend  1/3/2020  12:03 PM  Dr Mariella Mendez, DO      Assessment/Plan     30 y o , [id number] with YARI of 2020 by LMP    [address] by doppler    Anticipating a  delivery  Patient considering tubal ligation, has not signed consent  PN Labs   Initial interview with nurse on 10/02/2019   HIV NR   UA and urine culture mixed contaminants   Hep B NR   CBC Hg 12 0   Rubella immune   Blood type O+, antibody neg   RPR NR   Glucose 1H P, 3hr 80   GC chlamydia neg      1  Pregnancy: ORDERED TODAY   CBC with platelets   RPR   GCT 3 Hr- patient already failed 1h glucose test    2  Counseling: Encouraged patient to call office if she experiences a gush of fluids, vaginal bleeding, a decrease in fetal movement, or more than 4 contractions in 1 hour  Discussed the advantages of breast feeding (for at least 1 year and exclusively for the first 6 months): low incidence of infantile colic, easier to digest than formula, does not induce allergic response, lower incidence of infection, and helps mother return to pre-pregnancy weight faster  3  Immunizations: Tdap (27-36w) : TODAY    4  Follow up: RTO in 2 weeks     ------------------------------------------------------------------------------------------------------------------------------------------------------------------------------------------------------------------------------------------------------    SUBJECTIVE    Patient is a [id number] at 26w6d here for her prenatal visits  She is currently doing well  She has no complaints  She denies vaginal bleeding, cramping, leakage, and abnormal discharge  She reports good fetal movement  Review of Systems   Constitutional: Negative for chills and fever  Respiratory: Negative for shortness of breath  Cardiovascular: Negative for chest pain  Gastrointestinal: Negative for abdominal pain, diarrhea, nausea and vomiting     Genitourinary: Negative for dysuria, vaginal bleeding and vaginal pain  OB History    Para Term  AB Living   4 3 3 0 0 3   SAB TAB Ectopic Multiple Live Births   0 0 0 0 3      # Outcome Date GA Lbr Ameya/2nd Weight Sex Delivery Anes PTL Lv   4 Current            3 Term 18 39w1d  3175 g (7 lb) F CS-LTranv Spinal N RUTHIE      Name: Tung Bateman  (CIERRA)      Apgar1: 9  Apgar5: 9   2 Term 13 40w0d  3260 g (7 lb 3 oz) F CS-LTranv  N RUTHIE   1 Term 11 38w0d  2977 g (6 lb 9 oz) M CS-LTranv EPI N RUTHIE     __________________________________________________________________________________________________________________________________________________    OBJECTIVE  Vitals:    20 1154   BP: 109/76   Pulse: 101       Physical Exam   Constitutional: She appears well-developed and well-nourished  Cardiovascular: Normal rate, regular rhythm and normal heart sounds     Pulmonary/Chest: Effort normal and breath sounds normal    Abdominal:   Gravid       [address] [facility]   PGY1

## 2020-01-09 ENCOUNTER — APPOINTMENT (OUTPATIENT)
Dept: LAB | Facility: HOSPITAL | Age: 30
End: 2020-01-09

## 2020-01-09 DIAGNOSIS — Z3A.26 26 WEEKS GESTATION OF PREGNANCY: ICD-10-CM

## 2020-01-09 LAB
ERYTHROCYTE [DISTWIDTH] IN BLOOD BY AUTOMATED COUNT: 13.2 % (ref 11.6–15.1)
GLUCOSE 1H P 100 G GLC PO SERPL-MCNC: 163 MG/DL (ref 65–179)
GLUCOSE 2H P 100 G GLC PO SERPL-MCNC: 117 MG/DL (ref 65–154)
GLUCOSE 3H P 100 G GLC PO SERPL-MCNC: 80 MG/DL (ref 65–139)
GLUCOSE P FAST SERPL-MCNC: 81 MG/DL (ref 65–99)
HCT VFR BLD AUTO: 35.1 % (ref 34.8–46.1)
HGB BLD-MCNC: 11.6 G/DL (ref 11.5–15.4)
MCH RBC QN AUTO: 30.2 PG (ref 26.8–34.3)
MCHC RBC AUTO-ENTMCNC: 33 G/DL (ref 31.4–37.4)
MCV RBC AUTO: 91 FL (ref 82–98)
PLATELET # BLD AUTO: 198 THOUSANDS/UL (ref 149–390)
PMV BLD AUTO: 11 FL (ref 8.9–12.7)
RBC # BLD AUTO: 3.84 MILLION/UL (ref 3.81–5.12)
WBC # BLD AUTO: 6.23 THOUSAND/UL (ref 4.31–10.16)

## 2020-01-09 PROCEDURE — 85027 COMPLETE CBC AUTOMATED: CPT

## 2020-01-09 PROCEDURE — 82952 GTT-ADDED SAMPLES: CPT

## 2020-01-09 PROCEDURE — 82951 GLUCOSE TOLERANCE TEST (GTT): CPT

## 2020-01-09 PROCEDURE — 36415 COLL VENOUS BLD VENIPUNCTURE: CPT

## 2020-01-09 PROCEDURE — 86592 SYPHILIS TEST NON-TREP QUAL: CPT

## 2020-01-10 LAB — RPR SER QL: NORMAL

## 2020-01-16 ENCOUNTER — PATIENT OUTREACH (OUTPATIENT)
Dept: OBGYN CLINIC | Facility: CLINIC | Age: 30
End: 2020-01-16

## 2020-01-16 ENCOUNTER — ROUTINE PRENATAL (OUTPATIENT)
Dept: OBGYN CLINIC | Facility: CLINIC | Age: 30
End: 2020-01-16

## 2020-01-16 VITALS
BODY MASS INDEX: 40.12 KG/M2 | DIASTOLIC BLOOD PRESSURE: 70 MMHG | WEIGHT: 235 LBS | SYSTOLIC BLOOD PRESSURE: 106 MMHG | HEIGHT: 64 IN | HEART RATE: 92 BPM

## 2020-01-16 DIAGNOSIS — O34.219 PREVIOUS CESAREAN SECTION COMPLICATING PREGNANCY: ICD-10-CM

## 2020-01-16 DIAGNOSIS — Z30.09 STERILIZATION EDUCATION: ICD-10-CM

## 2020-01-16 DIAGNOSIS — O99.213 OBESITY AFFECTING PREGNANCY IN THIRD TRIMESTER: ICD-10-CM

## 2020-01-16 DIAGNOSIS — Z3A.28 28 WEEKS GESTATION OF PREGNANCY: Primary | ICD-10-CM

## 2020-01-16 DIAGNOSIS — Z78.9 NEED FOR FOLLOW-UP BY SOCIAL WORKER: Primary | ICD-10-CM

## 2020-01-16 LAB
SL AMB  POCT GLUCOSE, UA: NORMAL
SL AMB POCT KETONES,UA: NORMAL
SL AMB POCT URINE PROTEIN: NORMAL

## 2020-01-16 PROCEDURE — 99213 OFFICE O/P EST LOW 20 MIN: CPT | Performed by: NURSE PRACTITIONER

## 2020-01-16 PROCEDURE — 81002 URINALYSIS NONAUTO W/O SCOPE: CPT | Performed by: NURSE PRACTITIONER

## 2020-01-16 NOTE — PROGRESS NOTES
Patient is primarily Yakut-speaking Sina Raphael at bedside to assist translation    Denies loss of fluids, vaginal bleeding and abdominal pain  Confirms frequent fetal movement  Tolerating prenatal vitamin well  Twenty-eight week labs reviewed  Desires permanent female sterilization  No questions or concerns today  BP:  106/70  Weight: +8 lb    Plan:  1  Continue prenatal vitamin and supplements daily  2  Fetal kick counts reviewed, encouraged daily and written information provided  3  Follow-up  Center as scheduled  4  Obesity in pregnancy-8 lb waking  5  Patient desires permanent female sterilization     - mA 31 signed   6   Common discomforts of pregnancy and precautions including  labor reviewed  RTO 2 weeks

## 2020-01-16 NOTE — PROGRESS NOTES
JESUS/BRITTANY met with 31 y/o-M- G4:P3- Amharic speaking woman for psychosocial assess  Pt resides with spouse and their 3 kids  Pt reported pregnancy was planned  Pt denies any usage of drug, alcohol or smoking  No mental health or domestic violence history  Pt has [address] [facility] Jax and [address]  Pt present cooperative  Denies nay question or concern at [facility] time  JESUS/BRITTANY explained her role and gave contact information  Pt will call as needed

## 2020-01-29 PROBLEM — Z3A.30 30 WEEKS GESTATION OF PREGNANCY: Status: ACTIVE | Noted: 2019-10-04

## 2020-01-29 NOTE — PATIENT INSTRUCTIONS
Conteo de patadas en el embarazo   CUIDADO AMBULATORIO:   El conteo de patadas  mide cuánto se está moviendo hammer bebé en el útero  Gomez patada de hammer bebé podría sentirse cinthya gomez torcedura, gomez vuelta, un crujido, un meneo o un golpe  Es común sentir a tu bebé patear a las 32 a 29 semanas de Jez Rouleau  Es posible que sienta al bebé patear ya a las 20 semanas de Jez Rouleau  Busque atención médica de inmediato si:   · Usted siente que hammer bebé patea menos conforme pasa el día  · Usted no siente ninguna patada en un día  Pregúntele a hammer Fiore Salvia vitaminas y minerales son adecuados para usted  · Usted siente un cambio en el número de patadas o movimientos de hammer bebé  · Usted siente menos de 10 patadas dentro de 2 horas después de contar 2 veces  · Usted tiene preguntas o inquietudes acerca de los movimientos de hammer bebé  Por qué realizar el conteo de patadas:  Los movimientos de hammer bebé podrían proporcionar información de la jeronimo de hammer bebé  Es posible que si hay problemas, hammer bebé se mueva menos o nada en lo absoluto  Él podría moverse menos si no tiene suficiente espacio para desarrollarse en hammer útero (vientre), o si no está obteniendo suficiente oxígeno o nutrientes de la placenta  Informe a hammer médico tan pronto cinthya usted sienta un cambio en los movimientos de hammer bebé  Los problemas que se encuentran en gomez etapa más temprana son más fáciles de tratar  ¿Cuándo tengo que realizar el conteo de patadas? Cuándo realizar el conteo de patadas:   · Cuente las patadas en el mismo horario todos los GRASSE  · Realice el conteo de las patadas cuando hammer bebé esté despierto y [place]  Hammer bebé podría estar más activo en la tarde  · Realice el conteo de las patadas después de comer o moisés un refrigerio  Hammer bebé podría estar más activo después de que usted coma  Espere 2 horas después de beber líquidos que contengan cafeína   La cafeína puede hacer que hammer bebé esté más activo que lo normal     · Usted no debe fumar mientras está embarazada  El fumar aumenta el riesgo de problemas de jeronimo para usted y para marks bebé eloisa el Edna Olga  Si usted fuma, espere 2 horas para realizar el conteo de patadas  La nicotina puede hacer que marks bebé sea más activo de lo usual   Cómo realizar el conteo de patadas:  Revise que marks bebé esté despierto antes de realizar el conteo de patadas  Usted puede despertar a marks bebé empujando marks estómago suavemente, caminando o tomando algo frío  Marks médico podría indicarle diferentes maneras de realizar el conteo  Él podría decirle que connie lo siguiente:  · Use gomez gráfica o un reloj para mantener un registro de la hora en que comienza y termina de contar  · Siéntese en gomez silla o acuéstese en marks costado derecho  · Coloque safia mariangel en la parte más mike de marks BJURHOLM  · Cuente hasta que llegue a las 10 patadas  Escriba cuánto tiempo le lleva contar las 10 patadas  · Podría moisés de 30 minutos a 2 horas para contar 10 patadas  No debería de moisés más de 2 horas para contar 10 patadas  · Si usted no siente las 10 patadas dentro de 2 horas, espere 1 hora y cuente de [place]  Marks bebé puede dormir hasta por 40 minutos a la vez  Acuda a safia consultas de control con marks médico según le indicaron  Anote safia preguntas para que se acuerde de hacerlas eloisa safia visitas  © 2017 [address] [employer] Information is for End User's use only and may not be sold, redistributed or otherwise used for commercial purposes  All illustrations and images included in CareNotes® are the copyrighted property of A D A M , Inc  or Logan Meyer  Esta información es sólo para uso en educación  Marks intención no es darle un consejo médico sobre enfermedades o tratamientos  Colsulte con marks Viva Hint farmacéutico antes de seguir cualquier régimen médico para saber si es seguro y efectivo para usted    El embarazo de la semana 31 a la 34   LO QUE NECESITA SABER:   Ruiz Jaimes están ocurriendo en mi cuerpo? Es posible que usted continúe teniendo síntomas tales cinthya falta de Knebel, Butts, contracciones o inflamación en safia tobillos y pies  Usted podría estar aumentando 1 ming por semana ahora  ¿Cómo me estrella cuidar en esta etapa de mi embarazo? · Consuma alimentos saludables y variados  Alimentos saludables incluyen frutas, verduras, panes de adair integral, alimentos lácteos bajos en grasa, frijoles, job magras y pescado  [place] líquidos cinthya se le haya indicado  Pregunte cuánto líquido debe moisés cada día y cuáles líquidos son los más adecuados para usted  Limite el consumo de cafeína a menos de [address]  Limite el consumo de pescado a 2 porciones cada semana  Escoja pescado con concentraciones bajas de trung cinthya atún al natural enlatado, camarón, salmón, bacalao o tilapia  No  coma pescado con concentraciones altas de trung cinthya pez charly, caballa gigante, pargo rayado y tiburón  · Controle la acidez  comiendo 4 o 5 comidas pequeñas cada día en vez de comidas grandes  Evite los alimentos picantes  · Controle la inflamación  al The   Jason [employer] y poner los pies en alto o elevados sobre [place]  · [id number] [place]  Hammer necesidad de ciertas vitaminas y [facility], cinthya el ácido fólico, aumenta eloisa el [facility]  Las vitaminas prenatales proporcionan algunas de las vitaminas y minerales adicionales que usted necesita  Las vitaminas prenatales también podrían ayudar a disminuir el riesgo de ciertos defectos de nacimiento  · Consulte con hammer médico acerca de hacer ejercicio  El ejercicio moderado puede ayudarla a mantenerse en forma  Hammer médico la ayudará a planear un programa de ejercicios que sea seguro para usted eloisa hammer [place]  · No fume  Si usted fuma, nunca es demasiado tarde para dejar de hacerlo  Fumar aumenta el riesgo de aborto espontáneo y otros problemas de jeronimo eloisa hammer [place]   Fumar puede causar que hammer bebé nazca antes de tiempo o que pese menos al [facility]  Solicite información a hammer médico si usted necesita ayuda para dejar de fumar  · No consuma alcohol  El alcohol pasa de hammer cuerpo al bebé a través de la placenta  Puede afectar el desarrollo del cerebro de hammer bebé y provocar el síndrome de alcoholismo fetal (SAF)  FAS es un vanessa de condiciones que causan [address], de comportamiento y de crecimiento  · Consulte con hammer médico antes de moisés cualquier medicamento  Muchos medicamentos pueden perjudicar a hammer bebé si usted los eunice [facility] [address]  No tome ningún medicamento, vitaminas, hierbas o suplementos sin marilia consultar con hammer Robbert Soda  use drogas ilegales o de la griggs (cinthya marihuana o cocaína) mientras está embarazada  ¿Cuáles son Lattie Blew consejos de seguridad eloisa el embarazo? · Evite jacuzzis y saunas  No use un jacuzzi o un sauna mientras usted está embarazada, especialmente eloisa el primer trimestre  Los [facility] y los saunas aumentan la temperatura de hammer bebé y el riesgo de defectos de nacimiento  · Evite la toxoplasmosis  [place] es gomez infección causada por comer carne cruda o estar cerca del excremento de un courtney infectado  [place] puede causar malformaciones congénitas, aborto espontáneo y Farhan Schein  Lávese las mariangel después de tocar carne cruda  Asegúrese de que la carne esté evelyn cocida antes de comerla  Evite los huevos crudos y la Vinh Franca  Use guantes o pida que alguien la ayude a limpiar la caja de arena del courtney mientras usted Cyndi Kasper  ¿Qué cambios están ocurriendo con mi bebé? Para las [address], hammer bebé podría pesar más de 5 libras  Hammer bebé medirá alrededor de 12 ½ pulgadas de leia desde el dina de la jarett hasta la rabadilla (parte inferior del bebé)  Hammer bebé está aumentando alrededor de ½ ming por semana  Ahora hammer bebé puede abrir y cerrar safia ojos   Las patadas y movimientos de hammer bebé son Alexia Ro en trinity momento  ¿Qué necesito saber acerca del cuidado prenatal?  Hammer médico le revisará hammer presión arterial y [place]  Es posible que también necesite lo siguiente:  · Un examen de orina  también podría realizarse para revisarle el azúcar y la proteína  Estas son señales de diabetes gestacional o de infección  La proteína en hammer orina también podría ser gomez señal de preeclampsia  La preeclampsia es gomez condición que puede desarrollarse eloisa la semana 21 o después en hammer embarazo  Esta provoca presión arterial manolo y [employer] con safia riñones y [place]  · La vacuna Tdap  podría ser recomendado por hammer médico      · La altura uterina  es gomez medición del útero para controlar el desarrollo de hammer bebé  [employer] por lo general es igual al número de [facility] que usted tiene de embarazo  Hammer médico también podría revisar la posición de hammer bebé  · El ritmo cardíaco de hammer bebé  será revisado  ¿Cuándo estrella buscar atención inmediata? · Usted presenta un mamie dolor de jarett que no desaparece  · Usted tiene cambios en la visión nuevos o en aumento, cinthya visión borrosa o con manchas  · Usted tiene inflamación nueva o creciente en hammer cesar o mariangel  · Usted tiene manchado o sangrado vaginal     · Usted rompe domingo o siente un chorro o gotas de agua tibia que le está bajando por hammer vagina  ¿Cuándo estrella comunicarme con mi médico?   · Usted tiene más de 5 contracciones en 1 hora  · Usted nota algún cambio en los movimientos de hammer bebé  · Usted tiene calambres, presión o tensión abdominal     · Usted tiene un cambio en la secreción vaginal     · Usted tiene escalofríos o fiebre  · Usted tiene comezón, ardor o dolor vaginal      · Usted tiene gomez secreción vaginal amarillenta, verdosa, tanya o de [employer]  · Usted tiene dolor o ardor al Regina Carrington, oriolvin menos de lo habitual o tiene [facility] rosada o sanguinolenta      · Usted tiene preguntas o inquietudes acerca de hammer Shy Rile  ACUERDOS SOBRE BRANCH CUIDADO:   Usted tiene el derecho de ayudar a planear branch cuidado  Aprenda todo lo que pueda sobre branch condición y cinthya darle tratamiento  Discuta safia opciones de tratamiento con safia médicos para decidir el cuidado que usted desea recibir  Usted siempre tiene el derecho de rechazar el tratamiento  Esta información es sólo para uso en educación  Branch intención no es darle un consejo médico sobre enfermedades o tratamientos  Colsulte con branch Claudette Mcgowan farmacéutico antes de seguir cualquier régimen médico para saber si es seguro y efectivo para usted  © 2017 [address] Information is for End User's use only and may not be sold, redistributed or otherwise used for commercial purposes  All illustrations and images included in CareNotes® are the copyrighted property of A DAVE A DWAYNE , Inc  or Logan Meyer

## 2020-01-30 ENCOUNTER — PATIENT OUTREACH (OUTPATIENT)
Dept: OBGYN CLINIC | Facility: CLINIC | Age: 30
End: 2020-01-30

## 2020-01-30 ENCOUNTER — ROUTINE PRENATAL (OUTPATIENT)
Dept: OBGYN CLINIC | Facility: CLINIC | Age: 30
End: 2020-01-30

## 2020-01-30 VITALS
SYSTOLIC BLOOD PRESSURE: 105 MMHG | DIASTOLIC BLOOD PRESSURE: 72 MMHG | HEIGHT: 64 IN | WEIGHT: 238 LBS | BODY MASS INDEX: 40.63 KG/M2 | HEART RATE: 103 BPM

## 2020-01-30 DIAGNOSIS — O99.213 OBESITY AFFECTING PREGNANCY IN THIRD TRIMESTER: Primary | ICD-10-CM

## 2020-01-30 DIAGNOSIS — Z3A.30 30 WEEKS GESTATION OF PREGNANCY: ICD-10-CM

## 2020-01-30 PROCEDURE — 99213 OFFICE O/P EST LOW 20 MIN: CPT | Performed by: NURSE PRACTITIONER

## 2020-01-30 NOTE — PROGRESS NOTES
Patient is primarily [facility] bedside to assist translation  Denies loss of fluid, vaginal bleeding and abdominal pain  Confirms frequent fetal movement  Tolerating prenatal vitamin well  No questions or concerns at today's visit  BP: 105/72 weight: +11 lb urine:  Unable to provide specimen at today's visit  Plan:  1  Continue prenatal vitamin and supplements daily  2  Encouraged to continue fetal kick counts daily  3  Follow-up  Center as scheduled  4  Obesity in pregnancy-11 lb weight gain  5  Desires repeat  section and permanent female sterilization     - repeat  section and permanent female sterilization scheduled for 3/30/2020 @10am   Preoperative showering instructions and soap provided  6   Common discomforts of pregnancy and precautions including  labor reviewed  RTO 2 weeks

## 2020-01-30 NOTE — PROGRESS NOTES
JESUS met with Pt and Provider to assist with interpretation during prenatal visit  Pt reported she is doing well  No complaints at this time  Pt was advice to call JESUS [facility] with any VB,LOF, Decreased fetal movement, Ctxs  Pt will return in to weeks  C/S scheduled today for 3/30/2020  Pt verbalized understanding  Denies question or concern

## 2020-02-13 ENCOUNTER — ULTRASOUND (OUTPATIENT)
Dept: PERINATAL CARE | Facility: CLINIC | Age: 30
End: 2020-02-13
Payer: COMMERCIAL

## 2020-02-13 ENCOUNTER — ROUTINE PRENATAL (OUTPATIENT)
Dept: OBGYN CLINIC | Facility: CLINIC | Age: 30
End: 2020-02-13

## 2020-02-13 VITALS
HEIGHT: 64 IN | BODY MASS INDEX: 40.8 KG/M2 | WEIGHT: 239 LBS | HEART RATE: 98 BPM | DIASTOLIC BLOOD PRESSURE: 70 MMHG | SYSTOLIC BLOOD PRESSURE: 126 MMHG

## 2020-02-13 DIAGNOSIS — E66.01 MATERNAL MORBID OBESITY, ANTEPARTUM (HCC): Primary | ICD-10-CM

## 2020-02-13 DIAGNOSIS — Z3A.32 32 WEEKS GESTATION OF PREGNANCY: Primary | ICD-10-CM

## 2020-02-13 DIAGNOSIS — O99.210 MATERNAL MORBID OBESITY, ANTEPARTUM (HCC): Primary | ICD-10-CM

## 2020-02-13 DIAGNOSIS — Z3A.32 32 WEEKS GESTATION OF PREGNANCY: ICD-10-CM

## 2020-02-13 PROCEDURE — 99212 OFFICE O/P EST SF 10 MIN: CPT | Performed by: OBSTETRICS & GYNECOLOGY

## 2020-02-13 PROCEDURE — 76816 OB US FOLLOW-UP PER FETUS: CPT | Performed by: OBSTETRICS & GYNECOLOGY

## 2020-02-13 PROCEDURE — 99213 OFFICE O/P EST LOW 20 MIN: CPT | Performed by: OBSTETRICS & GYNECOLOGY

## 2020-02-13 NOTE — PROGRESS NOTES
The patient was seen today for an ultrasound  Please see ultrasound report (located under Ob Procedures) for additional details  Thank you very much for allowing us to participate in the care of this very nice patient  Should you have any questions, please do not hesitate to contact me  Jm Fiore MD [place]  Attending Physician, Mari[place]

## 2020-02-13 NOTE — PROGRESS NOTES
Subjective     Abena Redd is a 30 y o  female being seen today for her obstetrical visit  She is at 32w5d gestation  Patient reports no bleeding, no contractions and no leaking  Fetal movement: normal     Menstrual History:  OB History        4    Para   3    Term   3       0    AB   0    Living   3       SAB   0    TAB   0    Ectopic   0    Multiple   0    Live Births   3                Patient's last menstrual period was 2019 (exact date)  The following portions of the patient's history were reviewed and updated as appropriate: allergies, current medications, past family history, past medical history, past social history, past surgical history and problem list     Review of Systems  Pertinent items are noted in HPI  Objective     /70 (BP Location: Right arm, Patient Position: Sitting, Cuff Size: Large)   Pulse 98   Ht 5' 4" (1 626 m)   Wt 108 kg (239 lb)   LMP 2019 (Exact Date)   BMI 41 02 kg/m²   FHT:  150 BPM   Uterine Size: unable to accurately assess given her large panus    Presentation: unsure        Assessment     Pregnancy 32 and 5/7 weeks     Plan     28-week labs reviewed, normal  Follow up in 2 Weeks      Patient scheduled for RLTCS and permanent sterilization on 3/30/2020

## 2020-02-17 ENCOUNTER — ROUTINE PRENATAL (OUTPATIENT)
Dept: PERINATAL CARE | Facility: CLINIC | Age: 30
End: 2020-02-17
Payer: COMMERCIAL

## 2020-02-17 VITALS
DIASTOLIC BLOOD PRESSURE: 64 MMHG | HEIGHT: 64 IN | BODY MASS INDEX: 41.69 KG/M2 | HEART RATE: 96 BPM | SYSTOLIC BLOOD PRESSURE: 124 MMHG | WEIGHT: 244.2 LBS

## 2020-02-17 DIAGNOSIS — E66.01 MATERNAL MORBID OBESITY, ANTEPARTUM (HCC): Primary | ICD-10-CM

## 2020-02-17 DIAGNOSIS — Z3A.33 33 WEEKS GESTATION OF PREGNANCY: ICD-10-CM

## 2020-02-17 DIAGNOSIS — O99.213 OBESITY AFFECTING PREGNANCY IN THIRD TRIMESTER: ICD-10-CM

## 2020-02-17 DIAGNOSIS — O99.210 MATERNAL MORBID OBESITY, ANTEPARTUM (HCC): Primary | ICD-10-CM

## 2020-02-17 PROCEDURE — 59025 FETAL NON-STRESS TEST: CPT | Performed by: OBSTETRICS & GYNECOLOGY

## 2020-02-17 NOTE — LETTER
February 17, 2020     Radha Presley, [address] [facility] [address] [facility]    Patient: Munir Valencia   YOB: 1990   Date of Visit: 2/17/2020       Dear Dr Eaton Linker: Thank you for referring Munir Valencia to me for evaluation  Below are my notes for this consultation  If you have questions, please do not hesitate to call me  I look forward to following your patient along with you  Sincerely,        Isabel Thorne MD        CC: No Recipients  Isabel Thorne MD  2/17/2020  7:34 PM  Sign at close encounter  Patient was supposed to be scheduled for a follow-up growth scan and to start her NSTs/BREANN is at 36 weeks as per Dr Carol Tan recommendation  A miss interpretation of his recommendation though occurred and she was brought in today  NST is reactive  She will return in 3 weeks for a growth scan and to restart her fetal testing       Isabel Thorne MD

## 2020-02-17 NOTE — LETTER
NST sleeve cover sheet    Patient name: Cheryl Adamson  : 1990  MRN: [id number]    YARI: Estimated Date of Delivery: 20    Obstetrician: _______SWB_________    Reason(s) for testing:  _______Obesity_____________      Testing frequency:    ___ 2x/wk  __X_ 1x/wk  ___ Dopplers  ___ BPP?       Last growth scan: __________________________________________

## 2020-02-17 NOTE — LETTER
Abena Dennison  MRN: [id number] MRN: [id number] MRN: [id number]  : [birth year]  YARI/GA: YARI/GA: YARI/GA:  Date:  Date:  Date:     Jordan Vogel  MRN: [id number] MRN: [id number] MRN: [id number]  : [birth year]  YARI/GA: YARI/GA: YARI/GA:  Date:  Date:  Date:     Jordan Vogel  MRN: [id number] MRN: [id number] MRN: [id number]  : [birth year]  YARI/GA: YARI/GA: YARI/GA:  Date:  Date:  Date:     Jordan Vogel  MRN: [id number] MRN: [id number] MRN: [id number]  : [birth year]  YARI/GA: YARI/GA: YARI/GA:  Date:  Date:  Date:     Jordan Vogel  MRN: [id number] MRN: [id number] MRN: [id number]  : [birth year]  YARI/GA: YARI/GA: YARI/GA:  Date:  Date:  Date:     Jordan Vogel  MRN: [id number] MRN: [id number] MRN: [id number]  : [birth year]  YARI/GA: YARI/GA: YARI/GA:  Date:  Date:  Date:     Jordan Vogel  MRN: [id number] MRN: [id number] MRN: [id number]  : [birth year]  YARI/GA: YARI/GA: YARI/GA:  Date:  Date:  Date:

## 2020-02-17 NOTE — PATIENT INSTRUCTIONS
Conteo de patadas en el embarazo   CUIDADO AMBULATORIO:   El conteo de patadas  mide cuánto se está moviendo hammer bebé en el útero  Gomez patada de hammer bebé podría sentirse cinthya gomez torcedura, gomez vuelta, un crujido, un meneo o un golpe  Es común sentir a tu bebé patear a las 32 a 29 semanas de [place]  Es posible que sienta al bebé patear ya a las 20 semanas de [place]  Busque atención médica de inmediato si:   · Usted siente que hammer bebé patea menos conforme pasa el día  · Usted no siente ninguna patada en un día  Pregúntele a hammer Miya Hefty vitaminas y minerales son adecuados para usted  · Usted siente un cambio en el número de patadas o movimientos de hammer bebé  · Usted siente menos de 10 patadas dentro de 2 horas después de contar 2 veces  · Usted tiene preguntas o inquietudes acerca de los movimientos de hammer bebé  Por qué realizar el conteo de patadas:  Los movimientos de hammer bebé podrían proporcionar información de la jeronimo de hammer bebé  Es posible que si hay problemas, hammer bebé se mueva menos o nada en lo absoluto  Él podría moverse menos si no tiene suficiente espacio para desarrollarse en hammer útero (vientre), o si no está obteniendo suficiente oxígeno o nutrientes de la placenta  Informe a hammer médico tan pronto cinthya usted sienta un cambio en los movimientos de hammer bebé  Los problemas que se encuentran en gomez etapa más temprana son más fáciles de tratar  ¿Cuándo tengo que realizar el conteo de patadas? Cuándo realizar el conteo de patadas:   · Cuente las patadas en el mismo horario todos los GRASSE  · Realice el conteo de las patadas cuando hammer bebé esté despierto y [place]  Hammer bebé podría estar más activo en la tarde  · Realice el conteo de las patadas después de comer o moisés un refrigerio  Hammer bebé podría estar más activo después de que usted coma  Espere 2 horas después de beber líquidos que contengan cafeína   La cafeína puede hacer que hammer bebé esté más activo que lo normal     · Usted no debe fumar mientras está embarazada  El fumar aumenta el riesgo de problemas de jeronimo para usted y para hammer bebé eloisa el Danae Mcbride  Si usted fuma, espere 2 horas para realizar el conteo de patadas  La nicotina puede hacer que hammer bebé sea más activo de lo usual   Cómo realizar el conteo de patadas:  Revise que hammer bebé esté despierto antes de realizar el conteo de patadas  Usted puede despertar a hammer bebé empujando hammer estómago suavemente, caminando o tomando algo frío  Hammer médico podría indicarle diferentes maneras de realizar el conteo  Él podría decirle que connie lo siguiente:  · Use gomez gráfica o un reloj para mantener un registro de la hora en que comienza y termina de contar  · Siéntese en gomez silla o acuéstese en hammer costado derecho  · Coloque safia mariangel en la parte más mike de hammer BJURHOLM  · Cuente hasta que llegue a las 10 patadas  Escriba cuánto tiempo le lleva contar las 10 patadas  Podría moisés de 30 minutos a 2 horas para contar 10 patadas  No debería de moisés más de 2 horas para contar 10 patadas  Acuda a safia consultas de control con hammer médico según le indicaron  Anote safia preguntas para que se acuerde de hacerlas eloisa safia visitas  © 2017 [address] [employer]  Information is for End User's use only and may not be sold, redistributed or otherwise used for commercial purposes  All illustrations and images included in CareNotes® are the copyrighted property of A D A M , Inc  or Logan Meyer  Esta información es sólo para uso en educación  Hammer intención no es darle un consejo médico sobre enfermedades o tratamientos  Colsulte con hammer Arben Hu farmacéutico antes de seguir cualquier régimen médico para saber si es seguro y efectivo para usted

## 2020-02-18 NOTE — PROGRESS NOTES
Patient was supposed to be scheduled for a follow-up growth scan and to start her NSTs/BREANN is at 36 weeks as per Dr Luz Elena Saldivar recommendation  A miss interpretation of his recommendation though occurred and she was brought in today  NST is reactive  She will return in 3 weeks for a growth scan and to restart her fetal testing       Aidan Faith MD

## 2020-02-27 ENCOUNTER — ROUTINE PRENATAL (OUTPATIENT)
Dept: OBGYN CLINIC | Facility: CLINIC | Age: 30
End: 2020-02-27

## 2020-02-27 VITALS
WEIGHT: 242.4 LBS | HEART RATE: 100 BPM | DIASTOLIC BLOOD PRESSURE: 71 MMHG | HEIGHT: 64 IN | BODY MASS INDEX: 41.38 KG/M2 | SYSTOLIC BLOOD PRESSURE: 121 MMHG

## 2020-02-27 DIAGNOSIS — Z3A.34 34 WEEKS GESTATION OF PREGNANCY: Primary | ICD-10-CM

## 2020-02-27 PROCEDURE — 99213 OFFICE O/P EST LOW 20 MIN: CPT | Performed by: OBSTETRICS & GYNECOLOGY

## 2020-02-27 NOTE — PROGRESS NOTES
OB/GYN prenatal visit    S: 30 y o  [id number] 34w5d here for PN visit  She has no obstetric complaints, including pelvic pain, contractions, vaginal bleeding, loss of fluid, or decreased fetal movement  Patient is well appearing and without any current complaints  O:  Vitals:    20 1344   BP: 121/71   Pulse: 100     Physical Examination:   General appearance - alert, well appearing, and in no distress  Chest - no tachypnea, retractions or cyanosis  Heart - normal rate and regular rhythm, no JVD  Abdomen - soft, nontender, nondistended, FH: 34cm - limited by obesity  Skin - normal coloration and turgor, no rashes, no suspicious skin lesions noted    FHR: 135bpm    A/P:  Hubert Molina 30 y o   presenting for routine prenatal care      Patient scheduled for RLTCS and permanent sterilization on 3/30/2020    Obie Rader MD  2020  1:57 PM

## 2020-03-05 ENCOUNTER — APPOINTMENT (OUTPATIENT)
Dept: LAB | Facility: HOSPITAL | Age: 30
End: 2020-03-05
Attending: FAMILY MEDICINE

## 2020-03-05 ENCOUNTER — TRANSCRIBE ORDERS (OUTPATIENT)
Dept: LAB | Facility: HOSPITAL | Age: 30
End: 2020-03-05

## 2020-03-05 DIAGNOSIS — Z11.9 SCREENING EXAMINATION FOR UNSPECIFIED INFECTIOUS DISEASE: Primary | ICD-10-CM

## 2020-03-05 DIAGNOSIS — Z11.9 SCREENING EXAMINATION FOR UNSPECIFIED INFECTIOUS DISEASE: ICD-10-CM

## 2020-03-05 LAB — RUBV IGG SERPL IA-ACNC: 134.9 IU/ML

## 2020-03-05 PROCEDURE — 86592 SYPHILIS TEST NON-TREP QUAL: CPT

## 2020-03-05 PROCEDURE — 86787 VARICELLA-ZOSTER ANTIBODY: CPT

## 2020-03-05 PROCEDURE — 87491 CHLMYD TRACH DNA AMP PROBE: CPT

## 2020-03-05 PROCEDURE — 86762 RUBELLA ANTIBODY: CPT

## 2020-03-05 PROCEDURE — 87591 N.GONORRHOEAE DNA AMP PROB: CPT

## 2020-03-05 PROCEDURE — 86735 MUMPS ANTIBODY: CPT

## 2020-03-05 PROCEDURE — 36415 COLL VENOUS BLD VENIPUNCTURE: CPT

## 2020-03-05 PROCEDURE — 86480 TB TEST CELL IMMUN MEASURE: CPT

## 2020-03-05 PROCEDURE — 86765 RUBEOLA ANTIBODY: CPT

## 2020-03-06 LAB
C TRACH DNA SPEC QL NAA+PROBE: NEGATIVE
GAMMA INTERFERON BACKGROUND BLD IA-ACNC: 0.04 IU/ML
M TB IFN-G BLD-IMP: NEGATIVE
M TB IFN-G CD4+ BCKGRND COR BLD-ACNC: 0.05 IU/ML
M TB IFN-G CD4+ BCKGRND COR BLD-ACNC: 0.08 IU/ML
MITOGEN IGNF BCKGRD COR BLD-ACNC: >10 IU/ML
N GONORRHOEA DNA SPEC QL NAA+PROBE: NEGATIVE
RPR SER QL: NORMAL

## 2020-03-10 LAB
MEV IGG SER QL: NORMAL
MUV IGG SER QL: ABNORMAL
VZV IGG SER IA-ACNC: ABNORMAL

## 2020-03-12 ENCOUNTER — ROUTINE PRENATAL (OUTPATIENT)
Dept: OBGYN CLINIC | Facility: CLINIC | Age: 30
End: 2020-03-12

## 2020-03-12 VITALS
HEART RATE: 98 BPM | WEIGHT: 245 LBS | SYSTOLIC BLOOD PRESSURE: 126 MMHG | BODY MASS INDEX: 41.83 KG/M2 | HEIGHT: 64 IN | DIASTOLIC BLOOD PRESSURE: 84 MMHG

## 2020-03-12 DIAGNOSIS — Z3A.36 36 WEEKS GESTATION OF PREGNANCY: Primary | ICD-10-CM

## 2020-03-12 DIAGNOSIS — O34.219 PREVIOUS CESAREAN SECTION COMPLICATING PREGNANCY: ICD-10-CM

## 2020-03-12 PROCEDURE — 87653 STREP B DNA AMP PROBE: CPT | Performed by: OBSTETRICS & GYNECOLOGY

## 2020-03-12 PROCEDURE — 99213 OFFICE O/P EST LOW 20 MIN: CPT | Performed by: OBSTETRICS & GYNECOLOGY

## 2020-03-12 NOTE — PROGRESS NOTES
Assessment & Plan  26 y o  [id number] at 36w5d presenting for routine prenatal visit  - Repeat  section and tubal ligation scheduled for 3/30/20  - MA-31 signed 20  - GBS collected today  - Return in 1 week for next prenatal visit    Problem List Items Addressed This Visit        Other    Previous  section complicating pregnancy      Other Visit Diagnoses     36 weeks gestation of pregnancy    -  Primary    Relevant Orders    Strep B DNA probe, amplification        ____________________________________________________________  Subjective  She is without complaint  She denies contractions, loss of fluid, or vaginal bleeding  She feels regular fetal movements  She still desires a tuba ligation at the time of her  section  Objective  /84 (BP Location: Left arm)   Pulse 98   Ht 5' 4" (1 626 m)   Wt 111 kg (245 lb)   LMP 2019 (Exact Date)   BMI 42 05 kg/m²   FHR: 134 bpm    Physical Exam   Constitutional: She is oriented to person, place, and time  She appears well-developed and well-nourished  Genitourinary: No vaginal discharge found  HENT:   Head: Normocephalic and atraumatic  Eyes: Conjunctivae and EOM are normal    Neck: Normal range of motion  Neck supple  Cardiovascular: Normal rate, regular rhythm and normal heart sounds  Pulmonary/Chest: Effort normal and breath sounds normal  No respiratory distress  Abdominal: Soft  There is no tenderness  There is no rebound and no guarding  Musculoskeletal: Normal range of motion  She exhibits no edema  Neurological: She is alert and oriented to person, place, and time  Skin: Skin is warm and dry  Psychiatric: She has a normal mood and affect   Her behavior is normal  Thought content normal         Patient's Active Problem List  Patient Active Problem List   Diagnosis    Kazakh speaking patient    Maternal morbid obesity, antepartum ([address] )    Vitamin D deficiency    Previous  section complicating pregnancy    32 weeks gestation of pregnancy    Obesity affecting pregnancy in third trimester    Sterilization education           Ravi Chavez MD  OB/GYN PGY-1  3/12/2020  2:34 PM

## 2020-03-13 ENCOUNTER — HOSPITAL ENCOUNTER (OUTPATIENT)
Facility: HOSPITAL | Age: 30
Discharge: HOME | End: 2020-03-14
Attending: OBSTETRICS & GYNECOLOGY | Admitting: OBSTETRICS & GYNECOLOGY

## 2020-03-13 ENCOUNTER — NURSE TRIAGE (OUTPATIENT)
Dept: OTHER | Facility: OTHER | Age: 30
End: 2020-03-13

## 2020-03-13 ENCOUNTER — TELEPHONE (OUTPATIENT)
Dept: OTHER | Facility: OTHER | Age: 30
End: 2020-03-13

## 2020-03-14 VITALS
OXYGEN SATURATION: 98 % | TEMPERATURE: 98.2 F | DIASTOLIC BLOOD PRESSURE: 70 MMHG | BODY MASS INDEX: 41.83 KG/M2 | HEIGHT: 64 IN | WEIGHT: 245 LBS | SYSTOLIC BLOOD PRESSURE: 122 MMHG | HEART RATE: 66 BPM | RESPIRATION RATE: 18 BRPM

## 2020-03-14 PROBLEM — Z3A.37 37 WEEKS GESTATION OF PREGNANCY: Status: ACTIVE | Noted: 2020-03-14

## 2020-03-14 LAB — GP B STREP DNA SPEC QL NAA+PROBE: NORMAL

## 2020-03-14 PROCEDURE — 99213 OFFICE O/P EST LOW 20 MIN: CPT

## 2020-03-14 NOTE — TELEPHONE ENCOUNTER
Patient is calling because she is 36 weeks and she is having some bloody discharged and thinks water broke and she is having some pain also      Paged pt Dr Delgado via TT @ 21:45p

## 2020-03-14 NOTE — PROGRESS NOTES
L&D Triage Note - OB/GYN  Abena Beltran 26 y o  female MRN: [id number]  Unit/Bed#: LD Triage  Encounter: [id number]      Assessment:  26 y o   at 37w0d with blood tinged mucous, no active bleeding, one contraction noted in 30 minutes    Plan:  1  Stable for discharge to home  2  Blood type O+  3  F/u in [facility] clinic next week      ______________________________________________________________________      Chief Compliant: I see blood when I wipe    TIME: 12:58 AM    Subjective:  26 y o  [place] at 37w0d with history of 3 prior c/s presents to L and D this evening with c/o seeing bloody mucous when wiped this evening  Also reports feeling contractions every 30-60 minutes since 3pm this afternoon  Denies loss of fluid, painful regular contractions or heavy vaginal bleeding  Feeling good movement    Patient scheduled for repeat c/s 3/30/2020      Objective:  Vitals:    20 0027   BP: 122/70   Pulse: 66   Resp: 18   Temp: 98 2 °F (36 8 °C)   SpO2: 98%     Sterile Speculum Exam - cervix visually closed with small amount of blood tinged mucous In os  SVE: 0 / 0% / -3  FHT:  Baseline 140 / Moderate 6 - 25 bpm / accels present, decels absent  [place]: q 30 minutes          Willaim El MD 3/14/2020 12:56 AM

## 2020-03-14 NOTE — DISCHARGE INSTRUCTIONS
Signos del parto temprano   LO QUE NECESITA SABER:   Los primeros signos del trabajo de parto pueden ocurrir semanas, días u horas antes de que el bebé esté listo para nacer  Usted podría tener signos de [place] de parto falso, que también se conocen cinthya contracciones de [place]  El [address] es común y puede que suceda varias semanas o días antes de hammer parto actual  Las contracciones no son regulares, y no suceden a corto tiempo la gomez de la [place]  El dolor es generalmente leve, no empeora y se siente solo en el frente  Puede que las contracciones [place] sucedan al final del día y paren después que usted cambie de posición, camine o descanse  INSTRUCCIONES SOBRE EL TAE HOSPITALARIA:   Llame al 911 en maria e de presentar lo siguiente:   · Usted tiene sangrado vaginal abundante  · No puede llegar al hospital antes de que el bebé comience a salir  Regrese a la caty de emergencias si:   · Usted tiene contracciones regulares y dolorosas con menos de 5 minutos de diferencia la gomez de la otra y que rodas de 30 a 79 segundos cada gomez  · Usted tiene un goteo haley o un desborde repentino de líquido missy [employer] de la vagina  · Usted nota gomez disminución repentina en los movimientos de hammer bebé  Comuníquese con hammer obstreta o hammer médico si:   · Usted tiene [employer] parte inferior de la espalda o el abdomen que no mejora cuando cambia de posición  · Usted nota hammer tapón mucoso o muestras  · Usted tiene preguntas o inquietudes acerca de hammer condición o cuidado  Signos y síntomas del trabajo de parto temprano:   · Iluminación  ocurre cuando hammer bebé baja por dentro de hammer pelvis  Puede ser que usted sienta un aumento de presión en hammer pelvis  Puede que esto suceda de unas cuantas semanas a unas horas antes que [employer]  · Las contracciones  son calambres y endurecimiento que ocurren en hammer útero para ayudar a  al bebé a través de hammer canal de parto   Las contracciones ocurren con regularidad y con Valdene Budge  Cada gomez dura aproximadamente de 30 a 79 segundos y se hacen cada vez más margarita hasta que dé a radha a hammer bebé  Las contracciones no se alivian con el movimiento  El dolor suele empezar en la parte inferior de la espalda y se irradia hacia el abdomen  · El borramiento del útero (adelgazamiento)  ocurre cuando hammer útero se suaviza y adelgaza para que pueda abrirse para el bebé con facilidad  No podrá sentir el borramiento  Hammer médico le examinará el bushra uterino para vinay si hay borramiento  · La dilatación  es la ampliación del bushra uterino  Hammer médico le examinará el bushra uterino para vinay si hay dilatación  El bushra uterino puede empezar a dilatarse semanas antes de que dé a radha a hammer bebé  Hammer bushra uterino estará completamente abierto y listo para el alumbramiento cuando tenga 10 centímetros de dilatación  · El aumento de la secreción  de la vagina puede suceder  Puede ser Sapna Hammans, transparente o levemente sanguinolenta  Estas muestras pueden también llamarse muestras de Valentin  Las muestras de jesica consisten de un tapón [employer] se forma y Odell Merchant hammer bushra del útero eloisa el Bergerseulogio  La secreción vaginal puede significar que el bushra uterino se está abriendo y preparando para el parto  · La ruptura de membranas  es gomez liberación repentina de líquido missy de la vagina  La rotura de membranas significa que se rompió la domingo  Es posible que hammer médico necesite romperle la domingo si no se rompe por sí ronn  © 2017 [address] Information is for End User's use only and may not be sold, redistributed or otherwise used for commercial purposes  All illustrations and images included in CareNotes® are the copyrighted property of A D A M , Inc  or Logan Meyer  Esta información es sólo para uso en educación  Hammer intención no es darle un consejo médico sobre enfermedades o tratamientos   Colsulte con hammer Jacqui Midget farmacéutico antes de seguir cualquier régimen médico para saber si es seguro y efectivo para usted

## 2020-03-14 NOTE — TELEPHONE ENCOUNTER
Reason for Disposition   [1] Pregnant 24-36 weeks () AND [2] pinkish or brownish mucous discharge    Answer Assessment - Initial Assessment Questions  1  ONSET: "When did this bleeding start?"         Around 1500 today   2  DESCRIPTION: "Describe the bleeding that you are having " "How much bleeding is there?"     - SPOTTING: spotting, or pinkish / brownish mucous discharge; does not fill panti-liner or pad     - MILD:  less than 1 pad / hour; less than patient's usual menstrual bleeding    - MODERATE: 1-2 pads / hour; small-medium blood clots (e g , pea, grape, small coin)     - SEVERE: soaking 2 or more pads/hour for 2 or more hours; bleeding not contained by pads or continuous red blood from vagina; large blood clots (e g , golf ball, large coin)         Mild  Patient has been having mucous discharge with mix of blood     3  ABDOMINAL PAIN SEVERITY: If present, ask: "How bad is it?"  (e g , Scale 1-10; mild, moderate, or severe)    - MILD (1-3): doesn't interfere with normal activities, abdomen soft and not tender to touch     - MODERATE (4-7): interferes with normal activities or awakens from sleep, tender to touch     - SEVERE (8-10): excruciating pain, doubled over, unable to do any normal activities       Mild  3 out of 10   4  PREGNANCY: "Do you know how many weeks or months pregnant you are?"       36 weeks, 6 days   5  YARI: "What date are you expecting to deliver?"      3/30/2020  6  FETAL MOVEMENT: "Has the baby's movement decreased or changed significantly from normal?"      Normal   7  HEMODYNAMIC STATUS: "Are you weak or feeling lightheaded?" If so, ask: "Can you stand and walk normally?"       No lightheadedness  Normal walking  Patient stated that her pelvis hurts with walking  8  OTHER SYMPTOMS: "What other symptoms are you having with the bleeding?" (e g , leaking fluid from vagina, contractions)      Patient denies      Protocols used: PREGNANCY - VAGINAL BLEEDING GREATER THAN 20 WEEKS EGA-ADULT-AH

## 2020-03-14 NOTE — TELEPHONE ENCOUNTER
Regarding: Bloody Discharged  ----- Message from Mckenzie Robertson sent at 3/13/2020  9:53 PM EDT -----  "I am 36 weeks pregnant and I am having bloody discharged and I think my water broke, I am also having some pain "

## 2020-03-19 ENCOUNTER — HOSPITAL ENCOUNTER (INPATIENT)
Facility: HOSPITAL | Age: 30
LOS: 2 days | Discharge: HOME | End: 2020-03-21
Attending: OBSTETRICS & GYNECOLOGY | Admitting: OBSTETRICS & GYNECOLOGY
Payer: COMMERCIAL

## 2020-03-19 ENCOUNTER — ROUTINE PRENATAL (OUTPATIENT)
Dept: OBGYN CLINIC | Facility: CLINIC | Age: 30
End: 2020-03-19

## 2020-03-19 ENCOUNTER — ANESTHESIA (INPATIENT)
Dept: LABOR AND DELIVERY | Facility: HOSPITAL | Age: 30
End: 2020-03-19
Payer: COMMERCIAL

## 2020-03-19 ENCOUNTER — ANESTHESIA EVENT (INPATIENT)
Dept: LABOR AND DELIVERY | Facility: HOSPITAL | Age: 30
End: 2020-03-19
Payer: COMMERCIAL

## 2020-03-19 VITALS
HEART RATE: 80 BPM | BODY MASS INDEX: 41.45 KG/M2 | DIASTOLIC BLOOD PRESSURE: 81 MMHG | HEIGHT: 64 IN | SYSTOLIC BLOOD PRESSURE: 117 MMHG | WEIGHT: 242.8 LBS

## 2020-03-19 DIAGNOSIS — Z3A.37 37 WEEKS GESTATION OF PREGNANCY: Primary | ICD-10-CM

## 2020-03-19 DIAGNOSIS — O34.219 PREVIOUS CESAREAN SECTION COMPLICATING PREGNANCY: ICD-10-CM

## 2020-03-19 LAB
ABO GROUP BLD: NORMAL
BASE EXCESS BLDCOA CALC-SCNC: -1.8 MMOL/L (ref 3–11)
BASE EXCESS BLDCOV CALC-SCNC: -1 MMOL/L (ref 1–9)
BLD GP AB SCN SERPL QL: NEGATIVE
ERYTHROCYTE [DISTWIDTH] IN BLOOD BY AUTOMATED COUNT: 13.7 % (ref 11.6–15.1)
HCO3 BLDCOA-SCNC: 27 MMOL/L (ref 17.3–27.3)
HCO3 BLDCOV-SCNC: 25.4 MMOL/L (ref 12.2–28.6)
HCT VFR BLD AUTO: 38.5 % (ref 34.8–46.1)
HGB BLD-MCNC: 12.7 G/DL (ref 11.5–15.4)
MCH RBC QN AUTO: 29.9 PG (ref 26.8–34.3)
MCHC RBC AUTO-ENTMCNC: 33 G/DL (ref 31.4–37.4)
MCV RBC AUTO: 91 FL (ref 82–98)
O2 CT VFR BLDCOA CALC: 9.3 ML/DL
OXYHGB MFR BLDCOA: 38.7 %
OXYHGB MFR BLDCOV: 67.5 %
PCO2 BLDCOA: 62.2 MM[HG] (ref 30–60)
PCO2 BLDCOV: 47.7 MM HG (ref 27–43)
PH BLDCOA: 7.26 [PH] (ref 7.23–7.43)
PH BLDCOV: 7.34 [PH] (ref 7.19–7.49)
PLATELET # BLD AUTO: 233 THOUSANDS/UL (ref 149–390)
PMV BLD AUTO: 11.5 FL (ref 8.9–12.7)
PO2 BLDCOA: 19.7 MM HG (ref 5–25)
PO2 BLDCOV: 29.3 MM HG (ref 15–45)
RBC # BLD AUTO: 4.25 MILLION/UL (ref 3.81–5.12)
RH BLD: POSITIVE
SAO2 % BLDCOV: 17 ML/DL
SPECIMEN EXPIRATION DATE: NORMAL
WBC # BLD AUTO: 6.64 THOUSAND/UL (ref 4.31–10.16)

## 2020-03-19 PROCEDURE — 86850 RBC ANTIBODY SCREEN: CPT | Performed by: OBSTETRICS & GYNECOLOGY

## 2020-03-19 PROCEDURE — 88302 TISSUE EXAM BY PATHOLOGIST: CPT | Performed by: PATHOLOGY

## 2020-03-19 PROCEDURE — 86900 BLOOD TYPING SEROLOGIC ABO: CPT | Performed by: OBSTETRICS & GYNECOLOGY

## 2020-03-19 PROCEDURE — 86901 BLOOD TYPING SEROLOGIC RH(D): CPT | Performed by: OBSTETRICS & GYNECOLOGY

## 2020-03-19 PROCEDURE — 99213 OFFICE O/P EST LOW 20 MIN: CPT | Performed by: OBSTETRICS & GYNECOLOGY

## 2020-03-19 PROCEDURE — 86592 SYPHILIS TEST NON-TREP QUAL: CPT | Performed by: OBSTETRICS & GYNECOLOGY

## 2020-03-19 PROCEDURE — 82805 BLOOD GASES W/O2 SATURATION: CPT | Performed by: OBSTETRICS & GYNECOLOGY

## 2020-03-19 PROCEDURE — 85027 COMPLETE CBC AUTOMATED: CPT | Performed by: OBSTETRICS & GYNECOLOGY

## 2020-03-19 PROCEDURE — 0UB70ZZ EXCISION OF BILATERAL FALLOPIAN TUBES, OPEN APPROACH: ICD-10-PCS | Performed by: OBSTETRICS & GYNECOLOGY

## 2020-03-19 RX ORDER — ONDANSETRON 2 MG/ML
4 INJECTION INTRAMUSCULAR; INTRAVENOUS EVERY 4 HOURS PRN
Status: ACTIVE | OUTPATIENT
Start: 2020-03-19 | End: 2020-03-20

## 2020-03-19 RX ORDER — CALCIUM CARBONATE 200(500)MG
1000 TABLET,CHEWABLE ORAL DAILY PRN
Status: DISCONTINUED | OUTPATIENT
Start: 2020-03-19 | End: 2020-03-21 | Stop reason: HOSPADM

## 2020-03-19 RX ORDER — DIAPER,BRIEF,INFANT-TODD,DISP
1 EACH MISCELLANEOUS DAILY PRN
Status: DISCONTINUED | OUTPATIENT
Start: 2020-03-19 | End: 2020-03-21 | Stop reason: HOSPADM

## 2020-03-19 RX ORDER — LIDOCAINE HYDROCHLORIDE 10 MG/ML
INJECTION, SOLUTION EPIDURAL; INFILTRATION; INTRACAUDAL; PERINEURAL AS NEEDED
Status: DISCONTINUED | OUTPATIENT
Start: 2020-03-19 | End: 2020-03-19 | Stop reason: SURG

## 2020-03-19 RX ORDER — SENNOSIDES 8.6 MG
1 TABLET ORAL DAILY PRN
Status: DISCONTINUED | OUTPATIENT
Start: 2020-03-19 | End: 2020-03-21 | Stop reason: HOSPADM

## 2020-03-19 RX ORDER — ONDANSETRON 2 MG/ML
4 INJECTION INTRAMUSCULAR; INTRAVENOUS ONCE AS NEEDED
Status: DISCONTINUED | OUTPATIENT
Start: 2020-03-19 | End: 2020-03-20 | Stop reason: ALTCHOICE

## 2020-03-19 RX ORDER — PROPOFOL 10 MG/ML
INJECTION, EMULSION INTRAVENOUS AS NEEDED
Status: DISCONTINUED | OUTPATIENT
Start: 2020-03-19 | End: 2020-03-19 | Stop reason: SURG

## 2020-03-19 RX ORDER — KETAMINE HCL IN NACL, ISO-OSM 100MG/10ML
SYRINGE (ML) INJECTION AS NEEDED
Status: DISCONTINUED | OUTPATIENT
Start: 2020-03-19 | End: 2020-03-19 | Stop reason: SURG

## 2020-03-19 RX ORDER — DOCUSATE SODIUM 100 MG/1
100 CAPSULE, LIQUID FILLED ORAL 2 TIMES DAILY
Status: DISCONTINUED | OUTPATIENT
Start: 2020-03-19 | End: 2020-03-21 | Stop reason: HOSPADM

## 2020-03-19 RX ORDER — DIPHENHYDRAMINE HYDROCHLORIDE 50 MG/ML
12.5 INJECTION INTRAMUSCULAR; INTRAVENOUS ONCE AS NEEDED
Status: DISCONTINUED | OUTPATIENT
Start: 2020-03-19 | End: 2020-03-21 | Stop reason: HOSPADM

## 2020-03-19 RX ORDER — OXYTOCIN/RINGER'S LACTATE 30/500 ML
PLASTIC BAG, INJECTION (ML) INTRAVENOUS CONTINUOUS PRN
Status: DISCONTINUED | OUTPATIENT
Start: 2020-03-19 | End: 2020-03-19 | Stop reason: SURG

## 2020-03-19 RX ORDER — HYDROMORPHONE HCL/PF 1 MG/ML
1 SYRINGE (ML) INJECTION EVERY 2 HOUR PRN
Status: DISCONTINUED | OUTPATIENT
Start: 2020-03-20 | End: 2020-03-21 | Stop reason: HOSPADM

## 2020-03-19 RX ORDER — HYDROMORPHONE HCL/PF 1 MG/ML
0.5 SYRINGE (ML) INJECTION EVERY 2 HOUR PRN
Status: ACTIVE | OUTPATIENT
Start: 2020-03-19 | End: 2020-03-20

## 2020-03-19 RX ORDER — METOCLOPRAMIDE HYDROCHLORIDE 5 MG/ML
5 INJECTION INTRAMUSCULAR; INTRAVENOUS EVERY 6 HOURS PRN
Status: ACTIVE | OUTPATIENT
Start: 2020-03-19 | End: 2020-03-20

## 2020-03-19 RX ORDER — SODIUM CHLORIDE, SODIUM LACTATE, POTASSIUM CHLORIDE, CALCIUM CHLORIDE 600; 310; 30; 20 MG/100ML; MG/100ML; MG/100ML; MG/100ML
125 INJECTION, SOLUTION INTRAVENOUS CONTINUOUS
Status: DISCONTINUED | OUTPATIENT
Start: 2020-03-19 | End: 2020-03-19

## 2020-03-19 RX ORDER — OXYTOCIN/RINGER'S LACTATE 30/500 ML
62.5 PLASTIC BAG, INJECTION (ML) INTRAVENOUS ONCE
Status: COMPLETED | OUTPATIENT
Start: 2020-03-19 | End: 2020-03-20

## 2020-03-19 RX ORDER — BUPIVACAINE HYDROCHLORIDE 7.5 MG/ML
INJECTION, SOLUTION INTRASPINAL AS NEEDED
Status: DISCONTINUED | OUTPATIENT
Start: 2020-03-19 | End: 2020-03-19 | Stop reason: SURG

## 2020-03-19 RX ORDER — METOCLOPRAMIDE HYDROCHLORIDE 5 MG/ML
INJECTION INTRAMUSCULAR; INTRAVENOUS AS NEEDED
Status: DISCONTINUED | OUTPATIENT
Start: 2020-03-19 | End: 2020-03-19 | Stop reason: SURG

## 2020-03-19 RX ORDER — HYDROMORPHONE HCL/PF 1 MG/ML
0.2 SYRINGE (ML) INJECTION
Status: DISCONTINUED | OUTPATIENT
Start: 2020-03-19 | End: 2020-03-21 | Stop reason: HOSPADM

## 2020-03-19 RX ORDER — ACETAMINOPHEN 325 MG/1
650 TABLET ORAL EVERY 6 HOURS
Status: DISCONTINUED | OUTPATIENT
Start: 2020-03-19 | End: 2020-03-21 | Stop reason: HOSPADM

## 2020-03-19 RX ORDER — DEXAMETHASONE SODIUM PHOSPHATE 10 MG/ML
8 INJECTION, SOLUTION INTRAMUSCULAR; INTRAVENOUS ONCE AS NEEDED
Status: DISPENSED | OUTPATIENT
Start: 2020-03-19 | End: 2020-03-20

## 2020-03-19 RX ORDER — ONDANSETRON 2 MG/ML
4 INJECTION INTRAMUSCULAR; INTRAVENOUS EVERY 8 HOURS PRN
Status: DISCONTINUED | OUTPATIENT
Start: 2020-03-19 | End: 2020-03-19

## 2020-03-19 RX ORDER — ONDANSETRON 2 MG/ML
INJECTION INTRAMUSCULAR; INTRAVENOUS AS NEEDED
Status: DISCONTINUED | OUTPATIENT
Start: 2020-03-19 | End: 2020-03-19 | Stop reason: SURG

## 2020-03-19 RX ORDER — SODIUM CHLORIDE, SODIUM LACTATE, POTASSIUM CHLORIDE, CALCIUM CHLORIDE 600; 310; 30; 20 MG/100ML; MG/100ML; MG/100ML; MG/100ML
125 INJECTION, SOLUTION INTRAVENOUS CONTINUOUS
Status: DISCONTINUED | OUTPATIENT
Start: 2020-03-19 | End: 2020-03-21 | Stop reason: HOSPADM

## 2020-03-19 RX ORDER — FENTANYL CITRATE/PF 50 MCG/ML
25 SYRINGE (ML) INJECTION
Status: COMPLETED | OUTPATIENT
Start: 2020-03-19 | End: 2020-03-19

## 2020-03-19 RX ORDER — SODIUM CHLORIDE, SODIUM LACTATE, POTASSIUM CHLORIDE, CALCIUM CHLORIDE 600; 310; 30; 20 MG/100ML; MG/100ML; MG/100ML; MG/100ML
125 INJECTION, SOLUTION INTRAVENOUS CONTINUOUS
Status: DISCONTINUED | OUTPATIENT
Start: 2020-03-19 | End: 2020-03-20 | Stop reason: ALTCHOICE

## 2020-03-19 RX ORDER — MAGNESIUM HYDROXIDE/ALUMINUM HYDROXICE/SIMETHICONE 120; 1200; 1200 MG/30ML; MG/30ML; MG/30ML
15 SUSPENSION ORAL EVERY 6 HOURS PRN
Status: DISCONTINUED | OUTPATIENT
Start: 2020-03-19 | End: 2020-03-21 | Stop reason: HOSPADM

## 2020-03-19 RX ORDER — CEFAZOLIN SODIUM 2 G/50ML
SOLUTION INTRAVENOUS AS NEEDED
Status: DISCONTINUED | OUTPATIENT
Start: 2020-03-19 | End: 2020-03-19 | Stop reason: SURG

## 2020-03-19 RX ORDER — IBUPROFEN 600 MG/1
600 TABLET ORAL EVERY 6 HOURS PRN
Status: DISCONTINUED | OUTPATIENT
Start: 2020-03-19 | End: 2020-03-21 | Stop reason: HOSPADM

## 2020-03-19 RX ORDER — ACETAMINOPHEN 325 MG/1
650 TABLET ORAL EVERY 4 HOURS PRN
Status: DISCONTINUED | OUTPATIENT
Start: 2020-03-19 | End: 2020-03-21 | Stop reason: HOSPADM

## 2020-03-19 RX ORDER — NALOXONE HYDROCHLORIDE 0.4 MG/ML
0.1 INJECTION, SOLUTION INTRAMUSCULAR; INTRAVENOUS; SUBCUTANEOUS
Status: ACTIVE | OUTPATIENT
Start: 2020-03-19 | End: 2020-03-20

## 2020-03-19 RX ORDER — MEPERIDINE HYDROCHLORIDE 50 MG/ML
12.5 INJECTION INTRAMUSCULAR; INTRAVENOUS; SUBCUTANEOUS
Status: DISCONTINUED | OUTPATIENT
Start: 2020-03-19 | End: 2020-03-20 | Stop reason: ALTCHOICE

## 2020-03-19 RX ORDER — DIPHENHYDRAMINE HYDROCHLORIDE 50 MG/ML
25 INJECTION INTRAMUSCULAR; INTRAVENOUS EVERY 6 HOURS PRN
Status: DISPENSED | OUTPATIENT
Start: 2020-03-19 | End: 2020-03-20

## 2020-03-19 RX ORDER — KETOROLAC TROMETHAMINE 30 MG/ML
30 INJECTION, SOLUTION INTRAMUSCULAR; INTRAVENOUS EVERY 6 HOURS
Status: COMPLETED | OUTPATIENT
Start: 2020-03-19 | End: 2020-03-20

## 2020-03-19 RX ORDER — MORPHINE SULFATE 0.5 MG/ML
INJECTION, SOLUTION EPIDURAL; INTRATHECAL; INTRAVENOUS AS NEEDED
Status: DISCONTINUED | OUTPATIENT
Start: 2020-03-19 | End: 2020-03-19 | Stop reason: SURG

## 2020-03-19 RX ADMIN — CEFAZOLIN SODIUM 2000 MG: 2 SOLUTION INTRAVENOUS at 16:40

## 2020-03-19 RX ADMIN — PROPOFOL 10 MG: 10 INJECTION, EMULSION INTRAVENOUS at 17:58

## 2020-03-19 RX ADMIN — PROPOFOL 10 MG: 10 INJECTION, EMULSION INTRAVENOUS at 18:06

## 2020-03-19 RX ADMIN — KETOROLAC TROMETHAMINE 30 MG: 30 INJECTION, SOLUTION INTRAMUSCULAR at 20:02

## 2020-03-19 RX ADMIN — ONDANSETRON 4 MG: 2 INJECTION INTRAMUSCULAR; INTRAVENOUS at 17:13

## 2020-03-19 RX ADMIN — Medication 250 MILLI-UNITS/MIN: at 17:08

## 2020-03-19 RX ADMIN — SODIUM CHLORIDE, SODIUM LACTATE, POTASSIUM CHLORIDE, AND CALCIUM CHLORIDE 999 ML/HR: .6; .31; .03; .02 INJECTION, SOLUTION INTRAVENOUS at 15:45

## 2020-03-19 RX ADMIN — DOCUSATE SODIUM 100 MG: 100 CAPSULE, LIQUID FILLED ORAL at 22:30

## 2020-03-19 RX ADMIN — PROPOFOL 10 MG: 10 INJECTION, EMULSION INTRAVENOUS at 17:18

## 2020-03-19 RX ADMIN — LIDOCAINE HYDROCHLORIDE 300 MG: 10 INJECTION, SOLUTION EPIDURAL; INFILTRATION; INTRACAUDAL; PERINEURAL at 18:16

## 2020-03-19 RX ADMIN — FENTANYL CITRATE 25 MCG: 50 INJECTION, SOLUTION INTRAMUSCULAR; INTRAVENOUS at 19:45

## 2020-03-19 RX ADMIN — Medication 20 MG: at 17:53

## 2020-03-19 RX ADMIN — FENTANYL CITRATE 25 MCG: 50 INJECTION, SOLUTION INTRAMUSCULAR; INTRAVENOUS at 20:02

## 2020-03-19 RX ADMIN — PROPOFOL 10 MG: 10 INJECTION, EMULSION INTRAVENOUS at 18:01

## 2020-03-19 RX ADMIN — BUPIVACAINE HYDROCHLORIDE IN DEXTROSE 1.6 ML: 7.5 INJECTION, SOLUTION SUBARACHNOID at 16:43

## 2020-03-19 RX ADMIN — METOCLOPRAMIDE 10 MG: 5 INJECTION, SOLUTION INTRAMUSCULAR; INTRAVENOUS at 17:16

## 2020-03-19 RX ADMIN — DIPHENHYDRAMINE HYDROCHLORIDE 25 MG: 50 INJECTION INTRAMUSCULAR; INTRAVENOUS at 22:47

## 2020-03-19 RX ADMIN — SODIUM CHLORIDE, SODIUM LACTATE, POTASSIUM CHLORIDE, AND CALCIUM CHLORIDE: .6; .31; .03; .02 INJECTION, SOLUTION INTRAVENOUS at 18:24

## 2020-03-19 RX ADMIN — ACETAMINOPHEN 650 MG: 325 TABLET ORAL at 22:46

## 2020-03-19 RX ADMIN — SODIUM CHLORIDE, SODIUM LACTATE, POTASSIUM CHLORIDE, AND CALCIUM CHLORIDE 125 ML/HR: .6; .31; .03; .02 INJECTION, SOLUTION INTRAVENOUS at 22:50

## 2020-03-19 RX ADMIN — PROPOFOL 10 MG: 10 INJECTION, EMULSION INTRAVENOUS at 18:18

## 2020-03-19 RX ADMIN — Medication 62.5 MILLI-UNITS/MIN: at 19:45

## 2020-03-19 RX ADMIN — PROPOFOL 10 MG: 10 INJECTION, EMULSION INTRAVENOUS at 17:59

## 2020-03-19 RX ADMIN — AZITHROMYCIN 500 MG: 500 INJECTION, POWDER, LYOPHILIZED, FOR SOLUTION INTRAVENOUS at 16:48

## 2020-03-19 RX ADMIN — Medication 5 MG: at 18:16

## 2020-03-19 RX ADMIN — MORPHINE SULFATE 1.3 MG: 0.5 INJECTION, SOLUTION EPIDURAL; INTRATHECAL; INTRAVENOUS at 17:51

## 2020-03-19 RX ADMIN — PROPOFOL 20 MG: 10 INJECTION, EMULSION INTRAVENOUS at 17:51

## 2020-03-19 RX ADMIN — MORPHINE SULFATE 1 MG: 0.5 INJECTION, SOLUTION EPIDURAL; INTRATHECAL; INTRAVENOUS at 17:08

## 2020-03-19 RX ADMIN — SODIUM CHLORIDE, SODIUM LACTATE, POTASSIUM CHLORIDE, AND CALCIUM CHLORIDE: .6; .31; .03; .02 INJECTION, SOLUTION INTRAVENOUS at 16:34

## 2020-03-19 RX ADMIN — MORPHINE SULFATE 0.2 MG: 0.5 INJECTION, SOLUTION EPIDURAL; INTRATHECAL; INTRAVENOUS at 16:43

## 2020-03-19 RX ADMIN — PROPOFOL 10 MG: 10 INJECTION, EMULSION INTRAVENOUS at 17:17

## 2020-03-19 RX ADMIN — HYDROMORPHONE HYDROCHLORIDE 0.2 MG: 1 INJECTION, SOLUTION INTRAMUSCULAR; INTRAVENOUS; SUBCUTANEOUS at 20:47

## 2020-03-19 RX ADMIN — FENTANYL CITRATE 25 MCG: 50 INJECTION, SOLUTION INTRAMUSCULAR; INTRAVENOUS at 19:25

## 2020-03-19 RX ADMIN — MORPHINE SULFATE 1 MG: 0.5 INJECTION, SOLUTION EPIDURAL; INTRATHECAL; INTRAVENOUS at 17:41

## 2020-03-19 RX ADMIN — FENTANYL CITRATE 25 MCG: 50 INJECTION, SOLUTION INTRAMUSCULAR; INTRAVENOUS at 19:36

## 2020-03-19 RX ADMIN — PHENYLEPHRINE HYDROCHLORIDE 40 MCG/MIN: 10 INJECTION INTRAVENOUS at 16:45

## 2020-03-19 RX ADMIN — MORPHINE SULFATE 1 MG: 0.5 INJECTION, SOLUTION EPIDURAL; INTRATHECAL; INTRAVENOUS at 17:43

## 2020-03-19 RX ADMIN — HYDROMORPHONE HYDROCHLORIDE 0.2 MG: 1 INJECTION, SOLUTION INTRAMUSCULAR; INTRAVENOUS; SUBCUTANEOUS at 20:32

## 2020-03-19 RX ADMIN — PROPOFOL 10 MG: 10 INJECTION, EMULSION INTRAVENOUS at 18:03

## 2020-03-19 RX ADMIN — MORPHINE SULFATE 0.5 MG: 0.5 INJECTION, SOLUTION EPIDURAL; INTRATHECAL; INTRAVENOUS at 17:30

## 2020-03-19 NOTE — ANESTHESIA PREPROCEDURE EVALUATION
Review of Systems/Medical History  Patient summary reviewed  Chart reviewed      Cardiovascular  Negative cardio ROS    Pulmonary  Negative pulmonary ROS        GI/Hepatic  Negative GI/hepatic ROS          Negative  ROS        Endo/Other    Obesity  morbid obesity   GYN  Currently pregnant , Prior pregnancy/OB history : 3 Parity: 2,     Comment: Hx of c/s x3     Hematology  Negative hematology ROS      Musculoskeletal  Negative musculoskeletal ROS        Neurology    Headaches,    Psychology   Depression ,              Physical Exam    Airway    Mallampati score: II  TM Distance: >3 FB  Neck ROM: full     Dental   No notable dental hx     Cardiovascular  Comment: Negative ROS, Rhythm: regular, Rate: normal, Cardiovascular exam normal    Pulmonary  Pulmonary exam normal Breath sounds clear to auscultation,     Other Findings        Anesthesia Plan  ASA Score- 3     Anesthesia Type- spinal with ASA Monitors  Additional Monitors:   Airway Plan:         Plan Factors-    Induction-     Postoperative Plan-     Informed Consent- Anesthetic plan and risks discussed with patient  I personally reviewed this patient with the CRNA  Discussed and agreed on the Anesthesia Plan with the CRNA  Clint Huynh           Recent labs personally reviewed:  Lab Results   Component Value Date    WBC 6 64 2020    HGB 12 7 2020     2020     Lab Results   Component Value Date     02/10/2016    K 4 3 2016    BUN 12 2016    CREATININE 0 67 2016    GLUCOSE 81 02/10/2016     No results found for: PTT   No results found for: INR    Blood type O    Lab Results   Component Value Date    HGBA1C 5 4 2016

## 2020-03-19 NOTE — PATIENT INSTRUCTIONS
Coronavirus (SISPG-37) pregnancy FAQs: Medical experts answer your questions  From [employer] cy  [id number]  bc (courtesy of [facility])  As of 3/18/20  By Leilani Lowe 37  Medically reviewed by Society for Maternal-Fetal Medicine       We asked parents-to-be to send us their most pressing questions about coronavirus (COVID-19)  Among them: Is it still safe to deliver in a hospital? What if my ob-gyn has the virus? We sent those questions to the top docs at the Society for Maternal-Fetal Medicine  Here are their answers  The coronavirus (COVID-19) pandemic has been declared a national emergency in the [facility] by [employer]  Moms-to-be like you are concerned about everything from getting medicines to managing disruptions at work  But above and beyond any worry about lifestyle changes is a focus on your health and the impact of COVID-19 on your pregnancy  We asked obstetrics doctors who handle the most complicated pregnancy issues to answer your questions about the coronavirus  Here are their responses, provided by Dr Fam Barrett and her colleagues at the Society for [place] [employer]  Am I at more risk for COVID-19 if I'm pregnant? We don't know  Pregnancy does change your immune system in ways that might make you more susceptible to viral respiratory infections like COVID-19  If you become infected, you might also be at higher risk for more severe illness compared to the general population  Although this does not appear to be the case with COVID-19, based on limited data so far, a higher risk has been true for pregnant women with other coronavirus infections (SARS-CoV and MERS-CoV) and other viral respiratory infections, such as flu  It's important to take preventive actions to avoid infection, such as washing your hands often and avoiding people who are sick      How might coronavirus affect my pregnancy? Again, we don't know  Women with other coronavirus infections (such as SARS-CoV) did not have miscarriage or stillbirth at higher rates than the general population  We know that having other respiratory viral infections during pregnancy, such as flu, has been associated with problems like low birth weight and  birth  Also, having a high fever early in pregnancy may increase the risk of certain birth defects  Could I transmit coronavirus to my baby during pregnancy or delivery? We don't know whether you could transmit COVID-19 to your baby before or during delivery  However, among the few case studies of infants born to mothers with COVID-23 published in peer-reviewed literature, none of the infants tested positive for the virus  Also, there was no virus detected in samples of amniotic fluid or breast milk  There have been a few reports of newborns as young as a few days old with infection, suggesting that a mother can transmit the infection to her infant through close contact after delivery  There have been no reports of mother-to-baby transmission for other coronaviruses (MERS-CoV and SARS-CoV), although only limited information is available  Is it safe for me to deliver at a hospital where there have been COVID-19 cases? Yes  We know that COVID-19 is a very scary virus  The good news is that hospitals are taking great precautions to keep patients and healthcare providers safe  When a patient is even suspected to have COVID-19, they are placed in a negative pressure room  (Think of these rooms as vacuums that suck and filter the air so it's safe for the other people in the hospital)  This makes it possible for you to deliver at the hospital without putting you or your baby at risk  Hospitals are also implementing stricter visiting policies to keep patients safe  It's worth calling your hospital to check if there are any new regulations to be aware of      What plans should I make now in case the hospital system is overwhelmed when it's time for me to deliver? This is a great question to talk with your doctor or midwife about when you're 34 to 36 weeks pregnant  Every hospital is making different plans for dealing with this scenario  I work in healthcare  Should I ask my doctor to excuse me from work until the baby is born? What if I work in a school, the travel [place] [employer], or some other high-risk setting? Healthcare facilities should take care to limit the exposure of pregnant employees to patients with confirmed or suspected COVID-19, just as they would with other infectious cases  If you continue working, be sure to follow the CDC's risk assessment and infection control guidelines  If you work in a school, travel [employer] [employer], or other high-risk setting, talk with your employer about what it's doing to protect employees and minimize infection risks  Wash your hands often  What if my OB gets COVID-19? If your doctor or midwife tests positive for COVID-19, they will need to be quarantined until they recover and are no longer at risk of transmitting the virus  In this case, you'll be assigned to another OB in your doctor's practice (or you may choose another practitioner yourself)  Ask your new OB or your doctor's office if you should self-quarantine or be tested for the virus  (It will depend on when you last saw your provider and when that person tested positive )    Should we hold off on trying to conceive because of COVID-19? At this time, there's no reason to hold off on trying to get pregnant, but the data we have is really limited  For example, we don't think the virus causes birth defects or increases your risk of miscarriage  But we don't know whether you could transmit COVID-19 to your baby before or during delivery  We also don't know if the virus lives in semen or can be sexually transmitted      We have a babymoon scheduled in the next few months - should we cancel? If you're planning to travel internationally or on a cruise, you should strongly consider canceling  At this time, the virus has reached more than 140 countries, and there are travel bans to [place], most of [place], and [place]  Places where large numbers of people gather are at highest risk, especially airports and cruise ships  If you're planning travel in the [place] , note that any travel setting increases your risk of exposure, and there may be travel bans even in [place] by the time you're scheduled to go  Even if you're state is not blocked, you'll definitely want to avoid traveling to communities where the virus is spreading  To find out where these places are, check The [place] Times map based on CDC data  For the most current advice to help you avoid exposure, check the CDC's COVID-19 travel page  Will the hospital separate me from my  and keep the baby in quarantine? If you test positive for COVID-19 or have been exposed but have no symptoms, the CDC, [employer] of Obstetricians and Gynecologists, and the Society for [place] [employer] all recommend that you be  from your baby to decrease the risk of transmission to the baby  This would last until you are no longer at risk of transmitting the virus  If you do not have COVID-19 and have not been exposed to the virus, the hospital will not separate you from your   My hospital is restricting visitors and only allowing one support person  If my support person leaves after the delivery, will they be allowed to come back? Every hospital has different policies  Contact your hospital or labor and delivery unit a week or so before delivery to get the most up-to-date restrictions  In general, if your support person needs to leave, they would be allowed back unless they knew they were exposed to COVID-19 after leaving your company    [facility] understands that the coronavirus (COVID-19) pandemic is an evolving story and that your questions will change over time  We'll continue asking moms and dads in our Community what they want to know, and we'll get the answers from experts to keep them - and you - informed and supported  My mom was planning to fly here to help me care for my new baby after delivery  Should I tell her not to come? Yes  If your mom is over 61 or has any serious chronic medical conditions (such as heart disease, lung disease, or diabetes), she is at higher risk of serious illness from COVID-19 and should avoid air travel  And remember that any travel setting increases a person's risk of exposure  So, it may be risky to have her around the baby after she has been traveling  For the most current advice on traveling, check the CDC's COVID-19 travel page  [facility] understands that the coronavirus pandemic is an evolving story and that your questions will change over time  We'll continue asking moms and dads in our Community what they want to know, and we'll get the answers from experts to keep them - and you - informed and supported

## 2020-03-19 NOTE — OP NOTE
OPERATIVE REPORT  PATIENT NAME: Misty Montelongo    :  1990  MRN: [id number]  Pt Location: [facility] OR ROOM 02    SURGERY DATE: 3/19/2020    Surgeon(s) and Role:     * Melquiades Starkey DO - Primary     * Tashi Higgins MD - Assisting    Preop Diagnosis:  Previous  section complicating pregnancy [Q38 866]    Post-Op Diagnosis Codes:     * Previous  section complicating pregnancy [I99 542]    Procedure(s) (LRB):   SECTION () REPEAT (N/A)    Specimen(s):  ID Type Source Tests Collected by Time Destination   1 : Left Fallopian Tube  Tissue Fallopian Tube, Left TISSUE EXAM Melquiades Starkey,  3/19/2020 1735    2 : Right Fallopian Tube Tissue Fallopian Tube, Right TISSUE EXAM Melquiades Teton,  3/19/2020 1749    A :  Cord Blood Cord BLOOD GAS, VENOUS, CORD, BLOOD GAS, ARTERIAL, CORD Melquiades Starkey  3/19/2020 1708    B :  Tissue (Placenta on Hold) OB Only Placenta PLACENTA IN STORAGE Melquiades Starkey,  3/19/2020 1710      Drains:  Urethral Catheter Non-latex 16 Fr  (Active)   Site Assessment Clean;Skin intact 3/19/2020  5:01 PM   Collection Container Standard drainage bag 3/19/2020  5:01 PM   Number of days: 0       Anesthesia Type: Spinal    Operative Indications:  Previous  section complicating pregnancy [Z66 142]  Labor  Desire for sterilization  Obesity    Findings:  1  Delivery of viable male on 20 at 1707, weight 8lbs 5 2oz;  Apgar scores of 9 at one minute and 9 at five minutes  Umbilical artery pH 7 559 (base excess -1 8)  2  Normal appearing placenta with centrally-inserted 3 vessel cord  3  Clear amniotic fluid  4  Dense omental adhesions to the anterior abdominal wall  5  Otherwise grossly normal uterus, tubes, and ovaries    Specimens:   1  Arterial and venous cord gases  2  Cord blood  3  Segment of umbilical cord  4  Placenta to storage  5   Portion of bilateral tubes sent to pathology      Quantitative Blood Loss:  478 mL    Drains: Lopez catheter Complications:  None; patient tolerated the procedure well  Disposition: PACU            Condition: stable    Procedure Details     Mily Schroeder is a [id number] who presented to labor and delivery for labor  Her pregnancy was complicated by 3 prior  section, obesity and desire for sterilization  She was admitted for repeat  section and bilateral tubal ligation  Patient was made aware of these findings and the proposed plan  Risks, benefits, possible complications, alternate treatment options, and expected outcomes were discussed with the patient  The patient agreed with the proposed plan and gave informed consent  The patient was taken to the operating room where she was properly identified to the OR staff and attending physician  She received spinal anesthesia preoperatively  Fetal heart tones were appreciated and found to be appropriate  A Lopez catheter was aseptically inserted and SCDs were placed  The vagina was prepped with betadine and the abdomen was prepped with Chloraprep  Following appropriate drying time, the patient was draped in the usual sterile manner for a Pfannenstiel incision  The patient had received Ancef 2g IV and azithromycin 500 mg IV pre-operatively for prophylaxis  A Time Out was held and the above information confirmed  The patient was identified as Mily Schroeder and the procedure verified as  Delivery  A Pfannenstiel incision was made and carried down through the underlying subcutaneous tissue to the fascia using a scalpel  Rectus fascia was then incised in the midline and extended laterally using Go scissors and Bovie electrocautery  The superior edge of the fascia was grasped with Kocher clamps, tented upward, and the underlying muscle was sharply dissected off  The rectus muscles were  and a peritoneal window was identified  Dense omental adhesions were noted at this time    Careful dissection was done with electrocautery with special caution to avoid bowel injury  The bladder blade was inserted  A low transverse uterine incision was made with the scalpel and extended laterally with blunt dissection  The amnion was entered sharply  The surgeon's hand was inserted through the hysterotomy and the fetal head was palpated, elevated, and delivered through the uterine incision with the assistance of fundal pressure  Baby had  spontaneous cry with good color and tone  The umbilical cord was clamped and cut  The infant was handed off to the  providers  Arterial and venous cord gases, cord blood, and a segment of umbilical cord were obtained for evaluation and promptly sent to the lab  The placenta delivered spontaneously with uterine fundal massage and was noted to have a centrally inserted 3 vessel cord  This was also sent to the lab for placental storage  The uterus was left in situ and a moist lap sponge was used to clear the cavity of clots and products of conception  The uterine incision was closed with a running locked suture of 0 Vicryl  A second layer of the same suture was used in a running locked fashion secondary to bleeding at the hysterotomy site  Good hemostasis was confirmed upon uterine closure  At this point, our attention turned to the adnexa  The bilateral Fallopian tubes were identified  First the left Fallopian tube was identified and grasped with a Bower Hawking in an avascular area and tented up  Bovie electrocautery was used to make windows in the avascular spaces  Using a 2-0 Plain Gut suture, the bridging vessels were suture ligated  The fimbria was then transected with electrocautery     The pedicles were cauterized with Bovie electrocautery  Good hemostasis was noted at the pedicle sites  The right  Fallopian tube was identified and  ligated in the same manner  Good hemostasis was noted on this side  Portions of bilateral tube were sent to pathology for routine evaluation      The paracolic gutters were inspected and cleared of all clots and debris with irrigation and moist lap sponges  The fascia was closed with a running suture of 0 Vicryl  Subcutaneous tissues were closed with 2-0 plain gut suture  The skin was closed with 4-0 Monocryl in a subcuticular fashion  Sterile dressing was applied and an abdominal binder was then placed  At the conclusion of the procedure, all needle, sponge, and instrument counts were noted to be correct x2  The patient tolerated the procedure well and was transferred to her the recovery room in stable condition  Dr Bonnie Styles was present and participated in all key portions of the case      Jaren Hull MD  6:51 PM  3/19/2020

## 2020-03-19 NOTE — ANESTHESIA PROCEDURE NOTES
Spinal Block    Patient location during procedure: OB  Start time: 3/19/2020 4:43 PM  Reason for block: procedure for pain and at surgeon's request  Staffing  Anesthesiologist: Moises Haynes MD  Resident/CRNA: Rachel Watson CRNA  Performed: anesthesiologist and resident/CRNA   Preanesthetic Checklist  Completed: patient identified, site marked, surgical consent, pre-op evaluation, timeout performed, IV checked, risks and benefits discussed and monitors and equipment checked  Spinal Block  Patient position: sitting  Prep: ChloraPrep and site prepped and draped  Patient monitoring: cardiac monitor, frequent blood pressure checks, continuous pulse ox and heart rate  Approach: midline  Location: L3-4  Injection technique: single-shot  Needle  Needle type: pencil-tip   Needle gauge: 25 G  Needle length: 10 cm  Assessment  Sensory level: T4  Events: cerebrospinal fluid  Injection Assessment:  negative aspiration for heme, no paresthesia on injection and positive aspiration for clear CSF    Post-procedure:  adhesive bandage applied, pressure dressing applied, secured with tape, site cleaned and sterile dressing applied

## 2020-03-19 NOTE — H&P
H&P Exam - Obstetrics   Jack Daigle 26 y o  female MRN: [id number]  Unit/Bed#:  Triage 1 Encounter: [id number]      History of Present Illness     Chief Complaint: active labor    HPI:  Jack Daigle is a 26 y o   female with an YARI of 2020, by Last Menstrual Period at 37w5d weeks gestation who is being admitted for Active labor  Her current obstetrical history is significant for prior , desire for tubal sterilization, maternal obesity  Contractions: Date/time of onset: 3/18/20 and Frequency: Every 5-10 minutes  Leakage of fluid: blood tinged mucus  Bleeding: None  Fetal movement: present      PREGNANCY COMPLICATIONS: prior , desire for tubal sterilization, maternal obesity    OB History    Para Term  AB Living   4 3 3 0 0 3   SAB TAB Ectopic Multiple Live Births   0 0 0 0 3      # Outcome Date GA Lbr Ameya/2nd Weight Sex Delivery Anes PTL Lv   4 Current            3 Term 18 39w1d  3175 g (7 lb) F CS-LTranv Spinal N RUTHIE   2 Term 13 40w0d  3260 g (7 lb 3 oz) F CS-LTranv  N RUTHIE   1 Term 11 38w0d  2977 g (6 lb 9 oz) M CS-LTranv EPI N RUTHIE       Baby complications/comments: none apparent    Review of Systems    Historical Information   Past Medical History:   Diagnosis Date    Migraine     Obese     Varicella     vac     Past Surgical History:   Procedure Laterality Date     SECTION      x2,  (Male)  (Female), Last assessed: 14, per Allscripts    DC  DELIVERY ONLY N/A 3/19/2018    Procedure:  SECTION () REPEAT;  Surgeon: Sravani Hanna MD;  Location: [facility] ;  Service: Obstetrics     Social History   Social History     Substance and Sexual Activity   Alcohol Use No    Frequency: Never    Binge frequency: Never     Social History     Substance and Sexual Activity   Drug Use No     Social History     Tobacco Use   Smoking Status Never Smoker   Smokeless Tobacco Never Used     Family History: non-contributory    Meds/Allergies      Medications Prior to Admission   Medication    Biotin 65409 MCG TABS    Calcium 500-100 MG-UNIT CHEW    folic acid (FOLVITE) 1 mg tablet    Prenatal Vit-Fe Fumarate-FA (PRENATAL COMPLETE) 14-0 4 MG TABS      No Known Allergies    OBJECTIVE:    Vitals: Last menstrual period 06/29/2019, currently breastfeeding  There is no height or weight on file to calculate BMI  Physical Exam    Cervix: 6/80/-1       Fetal heart rate: Baseline: 135 bpm, Variability: Moderate 6 - 25 bpm, Accelerations: Reactive, Decelerations: Absent and Category 1       [place]: none       Prenatal Labs: I have personally reviewed pertinent reports  Blood type: O positive  Antigen Screen: negative  Rubella: Immune  Mumps and Varicella non immune  HIV: Negative  RPR: NR  Hep B: negative  Diabetes Screen: negative  GBS: negative    Invasive Devices     None                   Assessment/Plan     ASSESSMENT:  IUP at 37w5d weeks gestation in active labor  Hx three prior CS, will require immediate CS  PLAN:  1) Admit  2) CBC, RPR, Blood Type  3) Analgesia and/or epidural at patient request  4) To OR for urgent CS with tubal  5) Azithromycin for CS after labor onset  6) Will require immunization for Mumps and Varicella as she is non immune      This patient will be an INPATIENT  and I certify the anticipated length of stay is >2 Midnights      Eladio Brewster DO  Family Practice Resident PGY1  3/19/2020  3:36 PM

## 2020-03-19 NOTE — DISCHARGE SUMMARY
Discharge Summary - OB/GYN   Brittanie Méndez 26 y o  female MRN: [id number]  Unit/Bed#: LD OR  Encounter: [id number]      Admission Date: 3/19/2020     Discharge Date: 3/21/2020    Admitting Diagnosis:   1  Pregnancy at 37w5d  2  Prior  section x3  3  Obesity  4  Desire for sterilization    Discharge Diagnosis:   Same, delivered    Procedures: repeat  section, low transverse incision; bilateral fimbriectomy    Delivery Attending: Marcus Martines DO  Discharge Attending: Rocky Chang MD    Hospital Course:     Brittanie Méndez is a 26 y o  [id number] at 37w5d wks who was initially admitted for labor  Pregnancy was complicated with those listed above  She was 4 cm in the clinic, and progressed to 6 cm dilated in triage  The decision was made to proceed with urgent repeat  section  She delivered a viable male  on 3/19/2020 at 80  Weight 8lbs 5 2oz via repeat  section, low transverse incision  Apgars were 9 (1 min) and 9 (5 min)   was transferred to  nursery  Patient tolerated the procedure well and was transferred to recovery in stable condition  Her post-operative course was uncomplicated   Her postoperative pain was well controlled with oral analgesics  On day of discharge, she was ambulating and able to reasonably perform all ADLs  She was voiding and had appropriate bowel function  Pain was well controlled  She was discharged home on post-operative day #2 without complications  Patient was instructed to follow up with her OB as an outpatient and was given appropriate warnings to call provider if she develops signs of infection or uncontrolled pain  Complications: none apparent    Condition at discharge: good     Discharge instructions/Information to patient and family:   See after visit summary for information provided to patient and family        Provisions for Follow-Up Care:  See after visit summary for information related to follow-up care and any pertinent home health orders  Disposition: Home    Planned Readmission: No    Discharge Medications: For a complete list of the patient's medications, please refer to her med rec

## 2020-03-19 NOTE — DISCHARGE INSTRUCTIONS
Cuidado personal después del parto   CUIDADO AMBULATORIO:   El periodo postparto  es el periodo de tiempo desde el momento de blas a radha hasta por lo menos 6 semanas  Eloisa trinity tiempo usted puede experimentar muchos cambios físicos cinthya emocionales  Es muy importante entender que es lo normal y cuándo es necesario llamar a hammer médico  También es importante saber cinthya cuidarse a sí misma eloisa trinity periodo  Llame al 911 en maria e de presentar lo siguiente:   · Usted empapa gomez toalla higiénica en 15 minutos, tiene visión borrosa, piel húmeda o pálida y siente que se va a desmayar  · Usted se desmaya o pierde el conocimiento  · Hammer corazón está latiendo más rápido de lo normal      · Usted tiene dificultad para respirar  · Usted expectora jesica  Busque atención médica de inmediato si:   · Usted empapa 1 o más toallas higiénicas en 1 hora, o de hammer vagina le salen unos coágulos de jesica más grandes que gomez moneda de 25 ORLÉANS  · Hammer pierna está Kenzie Horsfall y [place] mike de lo normal      · Usted tiene dolor abdominal intenso  · Usted tiene un mamie dolor de jarett o cambios en hammer visión  · La incisión de la episiotomía o de la cesárea está jorge a, inflamada, sangrando o supurando pus  · La incisión se abre  · Usted ve o escucha cosas que no existen o tiene pensamientos de [place] daño a sí misma o de lastimar a hammer bebé  Comuníquese con hammer obstetra o la partera sí:   · Usted tiene fiebre  · Usted le comienza o se le empeora el dolor en hammer abdomen o vagina  · Usted sigue con tristeza de maternidad 10 cassius después del parto  · Usted tiene dificultad para dormir  · Usted tiene flujo vaginal con mal olor  · Usted tiene dolor o ardor al orinar  · Usted no tiene evacuaciones intestinales por 3 días o más  · Usted tiene náuseas o está vomitando  · Usted tiene unos bultos duros o ursula fragoso por encima de safia senos       · Usted tiene los CIGNA cuarteados o le están sangrando  · Usted tiene preguntas o inquietudes acerca de hammer condición o cuidado  Cambios físicos:  A continuación están los cambios normales después de blas a radha:  · Dolor en el área entre el ano y la vagina    · Dolor en el pecho    · Estreñimiento o hemorroides    · Golpes de calor o frío    · Sangrado o secreción vaginal    · Cólicos abdominales de leves a moderados    · Dificultad para controlar las deposiciones o la orina  Cambios emocionales:  Los siguientes son los síntomas de la tristeza de maternidad o de las emociones normales después de blas a radha  El cambio en safia emociones puede ser causado por un bajón en los niveles hormonales después del [place]  Si estos síntomas rodas más de 1 a 2 [address] de blas a radha, usted podría tener depresión posparto  · Sentirse irritable    · Sentimiento de tristeza    · Llora sin razón    · Sentimiento de ansiedad  Cuidado de los senos para mamás lactantes:  Usted puede tener los senos adoloridos eloisa 3 a 6 días después de blas a radha  [place] sucede a medida que la [place] a llenar safia senos  Es posible que también tenga los senos adoloridos en maria e que usted no esté dando de lactar a hammer bebé con frecuencia  Celestino lo siguiente para cuidar de safia senos:  · Aplique gomez compresa húmeda y tibia en safia senos según las indicaciones  [place] puede servir para calmar el dolor en safia senos  Asegúrese que el paño no esté muy caliente antes de colocarlo en hammer pecho  · Alimente al bebé o sáquese leche con frecuencia  [place] puede prevenir la congestión de los conductos de [place]  Pregunte a hammer médico con qué frecuencia debe alimentar a hammer bebé o sacarse leche  · Dese masajes en los senos según las indicaciones  [place] puede ayudar a aumentar el flujo de [place]  [place] con suavidad de forma circular los senos antes de la lactancia  Es posible que necesite apretar con suavidad hammer pecho o pezón para ayudar a que salga la leche   Usted también puede usar un extractor de [place] para ayudar a [employer] de safia senos  · Lávese los senos sólo con agua tibia  No use jabón en safia pezones  El jabón puede [employer]  · Aplique crema de lanolina en safia pezones según las indicaciones  La crema de lanonila puede servir para humectar hammer piel y evitar que los pezones se resequen  Siempre  debe quitarse la crema de lanolina con agua tibia antes de darle pecho a hammer bebé  · Use protectores para la lactancia en hammer brasier o sostén  La [employer] salir de safia pezones cuando usted no está dando de lactar  Usted se puede colocar los protectores dentro de hammer brasier para evitar que la [place] se traspase a hammer ropa  Pregunte a hammer médico sobre donde puede [employer] protectores para lactancia  · Solicite asistencia para la lactancia materna si lo necesita  Existen médicos que le pueden contestar las preguntas sobre la [place] y brindar [de-identified]  Pregunte a hammer médico con quién puede asesorarse si necesita asistencia con la lactancia  El cuidado de los senos para las madres que dan biberón con formula:  La leche materna llenará safia pechos incluso si usted alimenta a hammer bebé con leche de formula  A continuación unas cosas que puede hacer que ayudan a que deje de producir [place] y que safia senos se llenen y le provoque dolor:  · Use un sostén o brasier de soporte en todo momento  Un brasier deportivo o un sostén Verena Alpesh puede ayudar a suspender la producción de [place]  · Aplique hielo en cada seno por 15 a 20 minutos cada hora según las indicaciones  Use un paquete de hielo o ponga hielo molido dentro de [employer]  Cúbrala con gomez toalla  El hielo ayuda a encoger los conductos de [place]  · Evite que le caiga agua tibia en safia senos  El agua tibia hará que sea más fácil que la leche llene safia pechos  El la ducha póngase de espaldas al agua  · Limite la cantidad de tiempo que toca safia senos    [place] evitará que los senos se llenen de [place]  Cuidado del perineo:  El perineo es el área entre el recto y la vagina  Es normal tener inflamación y dolor en esta área después de blas a radha  Si a usted le hicieron gomez episiotomía, hammer médico le proporcionará instrucciones especiales  · Limpie el perineo después de ir al baño  [place] puede prevenir gomez infección y [de-identified] para la cicatrización  Use gomez botella de agua tibia con atomizador para limpiar el perineo  También puede rociar suavemente agua tibia contra el perineo mientras orina  Siempre debe limpiarse de adelante hacia atrás  · [address]  Un baño de asiento puede servir para aliviar la inflamación y el dolor  Llene la marvin o un recipiente con agua hasta que [place] safia caderas y siéntese en el agua  Use agua fría por 2 días después del parto  Luego use agua tibia  Pregunte a hammer médico por más [employer] vanesa de [place]  · Aplique compresas de hielo por las primeras 24 horas o [address] indicaciones  Use un guante médico y llénelo con hielo o compre compresas de hielo  Envuelva la compresa de hielo o el guante en gomez toalla o paño pequeño  Coloque la compresa de hielo en el perineo por 20 minutos cada vez  · Siéntese en un cojín en forma de gloria  [place] puede ayudar [employer] presión que se ejerce en hammer perineo cuando se sienta  · Use toallitas medicadas o tome pastillas según las indicaciones  Hammer médico puede indicarle que use toallitas de hamamelis  Usted puede colocar las toallitas de hamamelis en el refrigerados antes de aplicarlas en el perineo  También le puede indicar que tome un analgésico antiinflamatorio  Pregunte a hammer médico con que frecuencia usar las pastillas o usar las toallitas con medicamento  · No vaya a nadar ni tome vanesa en marvin por 4 a 6 semanas o según las indicaciones  [place] servirá para evitar gomez infección en hammer útero o vagina     El cuidado intestinal y de la vejiga:  Brigid Camilo puede moisés entre 3 a 5 días para tener gomez evacuación intestinal después de blas a radha a hammer bebé  Usted puede hacer lo siguiente para prevenir o controlar el estreñimiento y tener el control de safia intestinos o vejiga:  · [facility] indicaciones  Un ablandador fecal es un medicamento que hará que safia movimiento intestinal de materia fecal sea [address]  [place] puede prevenir o aliviar el estreñimiento  Un ablandador fecal además puede hacer que la evacuación intestinal duela menos  · [place] suficiente líquidos  Pregunte cuánto líquido debe moisés cada día y cuáles líquidos son los más adecuados para usted  Los líquidos pueden ayudar a prevenir el estreñimiento  · [place]ul[place] alimentos ricos en fibras  [address] frutas, verduras, granos, frijoles y lentejas  Pregunte a hammer médico cuál es la cantidad de fibra que necesita al día  La fibra puede prevenir el estreñimiento  · Connie los ejercicios de [place] según las indicaciones  Los ejercicios de [place] sirven para fortalecer los músculos que [employer] movimientos intestinales y la [place]  [place] a hammer médico más [employer] ejercicios de [place]  · Aplique gomez compresa o medicamento para la hemorroides según las indicaciones  [place] puede aliviar la inflamación y el dolor  Hammer médico le puede indicar que use hielo o pañitos medicados para la hemorroides  También le puede indicar que se connie vanesa tibios de [place]  Pregunte a hammer médico por mayor información sobre cómo controlar la hemorroides  Nutrición:  Alecia Eder buena nutrición es importante en el periodo posparto  La nutrición ayuda a que usted regrese a hammer peso saludable, aumenta el nivel de energía y dai el estreñimiento  También sirve para que [employer] suficientes nutrientes y calorías si usted va a alimentar a hammer pamela con [place]  · Consuma alimentos saludables y variados    Los alimentos saludables incluyen frutas, verduras, pan integral, productos lácteos bajos en grasa, frijoles, job magras y pescado  Usted puede [employer] 500 a 700 calorías adicionales al día si usted está dando de lactar a hammer bebé  Puede que también necesite añadir proteína  · Limite los alimentos con azúcar añadida y grandes cantidades de [place]  [place] alimentos son altos en calorías y bajos en nutrientes saludables  [place] las etiquetas de los alimentos para que sepa cuál es la cantidad de azúcar y grasas que contiene los alimentos que quiere comer  · Google 8 a 10 vasos de agua al día  El agua le ayudará a producir suficiente leche para hammer bebé  También le ayudará a prevenir el estreñimiento  Gertrudis un vaso de agua cada vez que amamanta a hammer bebé  · [address] [place]  Pregunte a hammer médico cuáles vitaminas necesita  · Limite la cafeína y el alcohol si usted está alimentando a hammer bebé con [employer]  Melanie Meneses y el alcohol pueden pasar a la [employer]  Melanie Meneses y el alcohol pueden hacer que hammer bebé esté molesto  [place] también interfiere con el sueño de hammer bebé  Pregunte a hammer médico si usted puede moisés alcohol o cafeína  Descanse y duerma:  Usted se puede sentir muy cansada en el periodo posparto  Dormir lo suficiente le ayudará a recuperarse y tener la energía para cuidar de hammer bebé  Los siguientes pueden ayudarle a dormir y descansar:  · Duerma cuando hammer bebé esté tomando la siesta  Hammer bebé puede moisés varias siestas eloisa el día  Descanse eloisa [employer]  · [address]  Muchas personas quieren venir a visitarla a usted y conocer al bebé  [place]dale a safia amigos y familiares que la visiten en diferentes días  [place] le dará tiempo para descansar  · No planee demasiadas cosas en un día  Deje los quehaceres de la casa para que tenga tiempo para descansar  Poco a poca connie más cada día  · Solicite ayuda de familiares, amigos y vecinos  Pida que le ayuden a danita la ropa, a limpiar o hacer mandados   También pregunte que alguien le cuide hammer bebé [place] eunice gomez siesta o se relaja  [place] a hammer weston que la ayude con el cuidado del bebé  Sáquese leche para que hammer [place] pueda alimentar a hammer bebé por la noche  Ejercicios después del parto:  Espere hasta que hammer médico la autorice a hacer ejercicio  El ejercicio puede ayudarla adelgazar, aumentar hammer niveles de energía y controlar hammer estado de ánimo  También puede prevenir el estreñimiento y los coágulos  Empiece con un ejercicio leve cinthya caminar  [employer] a medida que tenga [place]  Es posible que necesite evitar hacer ejercicios para el abdomen por 1 a 2 semana después del parto  Hable con hammer médico sobre un plan de ejercicios que sea adecuado para usted  Actividad sexual después del parto:   · No tenga relaciones sexuales hasta que hammer médico lo autorice  Es posible que necesite esperar de 4 a 6 semanas antes de [employer] relación sexual  [place] puede evitar que contraiga gomez infección y darle tiempo para recuperarse  · Hammer ciclo menstrual puede comenzar tan pronto cinthya en 3 semanas después de blas a radha  Hammer período puede demorarse si usted está dando de lactar a hammer bebé  Usted puede quedar embarazada antes de que le venga hammer primer período posparto  Consulte con hammer médico sobre los anticonceptivos que son los adecuados para usted  Algunos tipos de anticonceptivos no son [place]  Para [de-identified] y más información:  Participe en un vanessa de apoyo para madres primerizas  Pídale ayuda a familiares y [address] [facility] con los [address] de la casa, [place] y el cuidado de hammer bebé     · Office of Women's Health,  Department of Health and Human Services  [address] [facility], [address] [facility] [address]  [address], [address] [facility] , [facility] 178  Phone: 53 Murray Conley  Web Address: www womenshealth gov  · March of [facility]  [address] [facility] , [address] [facility]  [address] [facility] , [address] [place] [address]  Web Address: [employer] be  [email or web address]  [email or web address]  Programe gomez adolfo de control con marks obstetra o partera según las indicaciones:  Usted tendrá que acudir [place] 2 a 6 semanas a gomez adolfo de control con marks médico después del parto  Escriba las preguntas que tenga para que recuerde hacerlas eloisa saifa citas  © 2017 [address] Information is for End User's use only and may not be sold, redistributed or otherwise used for commercial purposes  All illustrations and images included in CareNotes® are the copyrighted property of A D A M , Inc  or Logan Meyer  Esta información es sólo para uso en educación  Marks intención no es darle un consejo médico sobre enfermedades o tratamientos  Colsulte con marks Ronalee Mon farmacéutico antes de seguir cualquier régimen médico para saber si es seguro y efectivo para usted

## 2020-03-19 NOTE — ANESTHESIA POSTPROCEDURE EVALUATION
Post-Op Assessment Note    CV Status:  Stable  Pain Score: 6       Mental Status:  Alert and awake   Hydration Status:  Euvolemic   PONV Controlled:  Controlled   Airway Patency:  Patent   Post Op Vitals Reviewed: Yes      Staff: CRNA           BP (P) 110/59 (03/19/20 1847)    Temp (P) 98 4 °F (36 9 °C) (03/19/20 1847)    Pulse (P) 74 (03/19/20 1847)   Resp (P) 18 (03/19/20 1847)    SpO2 (P) 98 % (03/19/20 1847)

## 2020-03-19 NOTE — PROGRESS NOTES
Assessment & Plan  26 y o  [id number] at 37w5d presenting for routine prenatal visit  - Patient walked over to Labor and Delivery by physicians for rule out labor; possibly proceed with repeat  section and tubal ligation today if patient in labor  - Patient desires tubal ligation; MA-31 signed 20  - GBS negative  - Counseled patient about changes in visitor policy due to [id number]    Problem List Items Addressed This Visit        Other    37 weeks gestation of pregnancy - Primary        ____________________________________________________________  Subjective  She reports feeling uncomfortable since yesterday and was not able to sleep last night with contractions  She states that the contractions are very uncomfortable when they come, about every 10-15 minutes  She also reports passing some blood-tinged mucus; she doesn't think she broke her water  She reports normal fetal movements  She denies any cough, fevers, or shortness of breath  She feels generally well otherwise and has no other complaints  Objective  /81 (BP Location: Left arm)   Pulse 80   Ht 5' 4" (1 626 m)   Wt 110 kg (242 lb 12 8 oz)   LMP 2019 (Exact Date)   BMI 41 68 kg/m²   FHR: 150 bpm    Physical Exam   Constitutional: She is oriented to person, place, and time  She appears well-developed and well-nourished  Genitourinary: Vaginal discharge found  Genitourinary Comments: SVE /-2   HENT:   Head: Normocephalic and atraumatic  Eyes: Conjunctivae and EOM are normal    Neck: Normal range of motion  Neck supple  Cardiovascular: Normal rate, regular rhythm and normal heart sounds  Pulmonary/Chest: Effort normal and breath sounds normal  No respiratory distress  Abdominal: Soft  There is no tenderness  There is no rebound and no guarding  Musculoskeletal: Normal range of motion  She exhibits no edema  Neurological: She is alert and oriented to person, place, and time  Skin: Skin is warm and dry  Psychiatric: She has a normal mood and affect   Her behavior is normal  Thought content normal         Patient's Active Problem List  Patient Active Problem List   Diagnosis    Jordanian speaking patient    Maternal morbid obesity, antepartum ([address] )    Vitamin D deficiency    Previous  section complicating pregnancy    32 weeks gestation of pregnancy    Obesity affecting pregnancy in third trimester    Sterilization education    37 weeks gestation of pregnancy

## 2020-03-20 LAB
BASOPHILS # BLD AUTO: 0.02 THOUSANDS/ΜL (ref 0–0.1)
BASOPHILS NFR BLD AUTO: 0 % (ref 0–1)
EOSINOPHIL # BLD AUTO: 0.05 THOUSAND/ΜL (ref 0–0.61)
EOSINOPHIL NFR BLD AUTO: 1 % (ref 0–6)
ERYTHROCYTE [DISTWIDTH] IN BLOOD BY AUTOMATED COUNT: 13.8 % (ref 11.6–15.1)
HCT VFR BLD AUTO: 29.9 % (ref 34.8–46.1)
HGB BLD-MCNC: 9.8 G/DL (ref 11.5–15.4)
IMM GRANULOCYTES # BLD AUTO: 0.02 THOUSAND/UL (ref 0–0.2)
IMM GRANULOCYTES NFR BLD AUTO: 0 % (ref 0–2)
LYMPHOCYTES # BLD AUTO: 1.03 THOUSANDS/ΜL (ref 0.6–4.47)
LYMPHOCYTES NFR BLD AUTO: 15 % (ref 14–44)
MCH RBC QN AUTO: 30.2 PG (ref 26.8–34.3)
MCHC RBC AUTO-ENTMCNC: 32.8 G/DL (ref 31.4–37.4)
MCV RBC AUTO: 92 FL (ref 82–98)
MONOCYTES # BLD AUTO: 0.47 THOUSAND/ΜL (ref 0.17–1.22)
MONOCYTES NFR BLD AUTO: 7 % (ref 4–12)
NEUTROPHILS # BLD AUTO: 5.38 THOUSANDS/ΜL (ref 1.85–7.62)
NEUTS SEG NFR BLD AUTO: 77 % (ref 43–75)
NRBC BLD AUTO-RTO: 0 /100 WBCS
PLATELET # BLD AUTO: 151 THOUSANDS/UL (ref 149–390)
PMV BLD AUTO: 11.2 FL (ref 8.9–12.7)
RBC # BLD AUTO: 3.25 MILLION/UL (ref 3.81–5.12)
RPR SER QL: NORMAL
WBC # BLD AUTO: 6.97 THOUSAND/UL (ref 4.31–10.16)

## 2020-03-20 PROCEDURE — 85025 COMPLETE CBC W/AUTO DIFF WBC: CPT | Performed by: OBSTETRICS & GYNECOLOGY

## 2020-03-20 RX ADMIN — KETOROLAC TROMETHAMINE 30 MG: 30 INJECTION, SOLUTION INTRAMUSCULAR at 15:30

## 2020-03-20 RX ADMIN — ACETAMINOPHEN 650 MG: 325 TABLET ORAL at 21:43

## 2020-03-20 RX ADMIN — KETOROLAC TROMETHAMINE 30 MG: 30 INJECTION, SOLUTION INTRAMUSCULAR at 01:47

## 2020-03-20 RX ADMIN — DOCUSATE SODIUM 100 MG: 100 CAPSULE, LIQUID FILLED ORAL at 08:29

## 2020-03-20 RX ADMIN — ACETAMINOPHEN 650 MG: 325 TABLET ORAL at 16:15

## 2020-03-20 RX ADMIN — SODIUM CHLORIDE, SODIUM LACTATE, POTASSIUM CHLORIDE, AND CALCIUM CHLORIDE 125 ML/HR: .6; .31; .03; .02 INJECTION, SOLUTION INTRAVENOUS at 03:51

## 2020-03-20 RX ADMIN — ENOXAPARIN SODIUM 40 MG: 40 INJECTION SUBCUTANEOUS at 08:28

## 2020-03-20 RX ADMIN — ENOXAPARIN SODIUM 40 MG: 40 INJECTION SUBCUTANEOUS at 21:43

## 2020-03-20 RX ADMIN — ACETAMINOPHEN 650 MG: 325 TABLET ORAL at 10:45

## 2020-03-20 RX ADMIN — KETOROLAC TROMETHAMINE 30 MG: 30 INJECTION, SOLUTION INTRAMUSCULAR at 08:27

## 2020-03-20 RX ADMIN — IBUPROFEN 600 MG: 600 TABLET ORAL at 21:43

## 2020-03-20 RX ADMIN — DOCUSATE SODIUM 100 MG: 100 CAPSULE, LIQUID FILLED ORAL at 19:40

## 2020-03-20 RX ADMIN — ACETAMINOPHEN 650 MG: 325 TABLET ORAL at 03:50

## 2020-03-20 NOTE — PROGRESS NOTES
Progress Note - OB/GYN   Abena Guy 26 y o  female MRN: [id number]  Unit/Bed#: -01 Encounter: [id number]    Assessment:  26 y o  [id number] s/p Repeat low transverse  section and bilateral tubal ligation post-operative day 1    Plan:  1  Routine post-partum care  2  Encourage ambulation  3  Pain control as needed  4  Advance diet as tolerated  5  Rhogam not indicated  6  Lopez removed this AM: void trial today  7  Hgb 12 7 --> f/u AM CBC  6  Anticipate discharge 3/20/20    Subjective/Objective   Chief Complaint:       Subjective:  Nava Maharaj is well appearing and has no complaints at this time  She denies any dizziness, nausea, vomiting, chest pain, shortness of breath, palpitations, or headaches  Pain: Well controlled with pain medication regimen  Tolerating PO: yes  Voiding: yes  Flatus: yes  BM: no  Ambulating: yes  Breastfeeding: yes  Chest pain: no  Shortness of breath: no  Leg pain: no  Lochia: Decreasing    Objective:     Vitals: Blood pressure 112/51, pulse 76, temperature 98 3 °F (36 8 °C), temperature source Oral, resp  rate 18, height 5' 4" (1 626 m), weight 110 kg (242 lb), last menstrual period 2019, SpO2 96 %, currently breastfeeding      Intake/Output Summary (Last 24 hours) at 3/20/2020 0702  Last data filed at 3/20/2020 0600  Gross per 24 hour   Intake 1500 ml   Output 1634 ml   Net -134 ml       Physical Exam:     General: NAD  Cardiovascular: RRR, no murmur, nl S1/S2   Lungs: CTAB, non-labored breathing   Abdomen: Soft, no distension/rebound/guarding/tenderness   Fundus: Firm, non-tender, fundus: -1 cm below the umbilicus   Incision: C/D/I  Lower Extremities: Non-tender  Other: Lopez removed this AM    Lab, Imaging and other studies:     Recent Results (from the past 72 hour(s))   CBC    Collection Time: 20  3:48 PM   Result Value Ref Range    WBC 6 64 4 31 - 10 16 Thousand/uL    RBC 4 25 3 81 - 5 12 Million/uL    Hemoglobin 12 7 11 5 - 15 4 g/dL    Hematocrit 38 5 34 8 - 46 1 %    MCV 91 82 - 98 fL    MCH 29 9 26 8 - 34 3 pg    MCHC 33 0 31 4 - 37 4 g/dL    RDW 13 7 11 6 - 15 1 %    Platelets 283 733 - 888 Thousands/uL    MPV 11 5 8 9 - 12 7 fL   Type and screen and continue to monitor patient    Collection Time: 03/19/20  3:48 PM   Result Value Ref Range    ABO Grouping O     Rh Factor Positive     Antibody Screen Negative     Specimen Expiration Date [id number]    Blood gas, venous, cord    Collection Time: 03/19/20  5:08 PM   Result Value Ref Range    pH, Cord Meño 7 344 7 190 - 7 490    pCO2, Cord Meño 47 7 (H) 27 0 - 43 0 mm HG    pO2, Cord Meño 29 3 15 0 - 45 0 mm HG    HCO3, Cord Meño 25 4 12 2 - 28 6 mmol/L    Base Exc, Cord Meño -1 0 (L) 1 0 - 9 0 mmol/L    O2 Cont, Cord Meño 17 0 mL/dL    O2 HGB,VENOUS CORD 67 5 %   Blood gas, arterial, cord    Collection Time: 03/19/20  5:08 PM   Result Value Ref Range    pH, Cord Art 7 256 7 230 - 7 430    pCO2, Cord Art 62 2 (H) 30 0 - 60 0    pO2, Cord Art 19 7 5 0 - 25 0 mm HG    HCO3, Cord Art 27 0 17 3 - 27 3 mmol/L    Base Exc, Cord Art -1 8 (L) 3 0 - 11 0 mmol/L    O2 Content, Cord Art 9 3 ml/dl    O2 Hgb, Arterial Cord 38 7 %     Meds:    acetaminophen 650 mg Oral Q6H   docusate sodium 100 mg Oral BID   enoxaparin 40 mg Subcutaneous Q12H ANNE   ketorolac 30 mg Intravenous Q6H       acetaminophen 650 mg Q4H PRN   aluminum-magnesium hydroxide-simethicone 15 mL Q6H PRN   benzocaine-menthol-lanolin-aloe 1 application H5A PRN   calcium carbonate 1,000 mg Daily PRN   dexamethasone 8 mg Once PRN   diphenhydrAMINE 12 5 mg Once PRN   diphenhydrAMINE 25 mg Q6H PRN   hydrocortisone 1 application Daily PRN   HYDROmorphone 0 2 mg Q5 Min PRN   HYDROmorphone 0 5 mg Q2H PRN   HYDROmorphone 1 mg Q2H PRN   ibuprofen 600 mg Q6H PRN   meperidine 12 5 mg Q10 Min PRN   metoclopramide 5 mg Q6H PRN   naloxone 0 1 mg Q3 min PRN   ondansetron 4 mg Once PRN   ondansetron 4 mg Q4H PRN   senna 1 tablet Daily PRN   witch hazel-glycerin 1 pad Q4H PRN Signature / Title: Mike Haque MD, Ob/Gyn, PGY-1  Date: 3/20/2020  Time: 7:02 AM

## 2020-03-20 NOTE — PLAN OF CARE
Problem: ALTERATION IN THE BREAST  Goal: Optimize infant feeding at the breast  Description  INTERVENTIONS:  - Latch, breast and nipple assessment  - Assess prior breast feeding history  - Hand expression of breast milk  - For cracked, bleeding and or sore nipples reassess latch, treat damaged nipple  -Educate mother on feeding cues  -Positioning/latch techniques  Outcome: Progressing     Problem: INADEQUATE LATCH, SUCK OR SWALLOW  Goal: Demonstrate ability to latch and sustain latch, audible swallowing and satiety  Description  INTERVENTIONS:  - Assess oral anatomy, notify Physician/AP for abnormal findings  - Establish milk expression  - Maximize feeding opportunity (skin to skin, behavioral state)  - Position/latch techniques  - Discourage use of pacifier-artificial nipple  - Mechanical pumping  - Nipple Shield  - Supplemental formula feeding (Physician/AP order)  - Alternative feeding method  Outcome: Progressing     Problem: POSTPARTUM  Goal: Experiences normal postpartum course  Description  INTERVENTIONS:  - Monitor maternal vital signs  - Assess uterine involution and lochia  Outcome: Progressing  Goal: Appropriate maternal -  bonding  Description  INTERVENTIONS:  - Identify family support  - Assess for appropriate maternal/infant bonding   -Encourage maternal/infant bonding opportunities  - Referral to  or  as needed  Outcome: Progressing  Goal: Establishment of infant feeding pattern  Description  INTERVENTIONS:  - Assess breast/bottle feeding  - Refer to lactation as needed  Outcome: Progressing  Goal: Incision(s), wounds(s) or drain site(s) healing without S/S of infection  Description  INTERVENTIONS  - Assess and document risk factors for skin impairment   - Assess and document dressing, incision, wound bed, drain sites and surrounding tissue  - Consider nutrition services referral as needed  - Oral mucous membranes remain intact  - Provide patient/ family education  Outcome: Progressing     Problem: DISCHARGE PLANNING - CARE MANAGEMENT  Goal: Discharge to post-acute care or home with appropriate resources  Description  INTERVENTIONS:  - Conduct assessment to determine patient/family and health care team treatment goals, and need for post-acute services based on payer coverage, community resources, and patient preferences, and barriers to discharge  - Address psychosocial, clinical, and financial barriers to discharge as identified in assessment in conjunction with the patient/family and health care team  - Arrange appropriate level of post-acute services according to patients   needs and preference and payer coverage in collaboration with the physician and health care team  - Communicate with and update the patient/family, physician, and health care team regarding progress on the discharge plan  - Arrange appropriate transportation to post-acute venues  Outcome: Progressing

## 2020-03-20 NOTE — PROGRESS NOTES
Pt with large gush of lochia on previously clean pads/blue chux  Uterus firm at umbilicus with no additional lochia expressed  Pt currently breastfeeding   mL  Will continue to monitor

## 2020-03-20 NOTE — LACTATION NOTE
This note was copied from a baby's chart  Went to see Stefania Rosas for initial teaching, however there was a disruption with the patient regarding hospital supplies  Security came to the floor to see patient  Mom was very upset by this and crying  Was not able to do teaching at this time  Brought booklet to the room for nurse to review with pt when she is calm

## 2020-03-20 NOTE — PLAN OF CARE
Problem: ALTERATION IN THE BREAST  Goal: Optimize infant feeding at the breast  Description  INTERVENTIONS:  - Latch, breast and nipple assessment  - Assess prior breast feeding history  - Hand expression of breast milk  - For cracked, bleeding and or sore nipples reassess latch, treat damaged nipple  -Educate mother on feeding cues  -Positioning/latch techniques  Outcome: Progressing     Problem: INADEQUATE LATCH, SUCK OR SWALLOW  Goal: Demonstrate ability to latch and sustain latch, audible swallowing and satiety  Description  INTERVENTIONS:  - Assess oral anatomy, notify Physician/AP for abnormal findings  - Establish milk expression  - Maximize feeding opportunity (skin to skin, behavioral state)  - Position/latch techniques  - Discourage use of pacifier-artificial nipple  - Mechanical pumping  - Nipple Shield  - Supplemental formula feeding (Physician/AP order)  - Alternative feeding method  Outcome: Progressing     Problem: POSTPARTUM  Goal: Experiences normal postpartum course  Description  INTERVENTIONS:  - Monitor maternal vital signs  - Assess uterine involution and lochia  Outcome: Progressing  Goal: Appropriate maternal -  bonding  Description  INTERVENTIONS:  - Identify family support  - Assess for appropriate maternal/infant bonding   -Encourage maternal/infant bonding opportunities  - Referral to  or  as needed  Outcome: Progressing  Goal: Establishment of infant feeding pattern  Description  INTERVENTIONS:  - Assess breast/bottle feeding  - Refer to lactation as needed  Outcome: Progressing  Goal: Incision(s), wounds(s) or drain site(s) healing without S/S of infection  Description  INTERVENTIONS  - Assess and document risk factors for skin impairment   - Assess and document dressing, incision, wound bed, drain sites and surrounding tissue  - Consider nutrition services referral as needed  - Oral mucous membranes remain intact  - Provide patient/ family education  Outcome: Progressing

## 2020-03-21 VITALS
BODY MASS INDEX: 41.32 KG/M2 | SYSTOLIC BLOOD PRESSURE: 105 MMHG | HEART RATE: 83 BPM | HEIGHT: 64 IN | OXYGEN SATURATION: 96 % | RESPIRATION RATE: 18 BRPM | TEMPERATURE: 98.9 F | WEIGHT: 242 LBS | DIASTOLIC BLOOD PRESSURE: 60 MMHG

## 2020-03-21 RX ORDER — OXYCODONE HYDROCHLORIDE 5 MG/1
5 TABLET ORAL EVERY 6 HOURS PRN
Qty: 10 TABLET | Refills: 0 | Status: SHIPPED | OUTPATIENT
Start: 2020-03-21 | End: 2020-03-31

## 2020-03-21 RX ORDER — ACETAMINOPHEN 325 MG/1
650 TABLET ORAL EVERY 4 HOURS PRN
Qty: 30 TABLET | Refills: 0 | Status: SHIPPED | OUTPATIENT
Start: 2020-03-21

## 2020-03-21 RX ORDER — IBUPROFEN 600 MG/1
600 TABLET ORAL EVERY 6 HOURS PRN
Qty: 30 TABLET | Refills: 0 | Status: SHIPPED | OUTPATIENT
Start: 2020-03-21

## 2020-03-21 RX ADMIN — ENOXAPARIN SODIUM 40 MG: 40 INJECTION SUBCUTANEOUS at 08:04

## 2020-03-21 RX ADMIN — IBUPROFEN 600 MG: 600 TABLET ORAL at 07:35

## 2020-03-21 RX ADMIN — DOCUSATE SODIUM 100 MG: 100 CAPSULE, LIQUID FILLED ORAL at 07:35

## 2020-03-21 RX ADMIN — ACETAMINOPHEN 650 MG: 325 TABLET ORAL at 04:32

## 2020-03-21 NOTE — PLAN OF CARE
Problem: ALTERATION IN THE BREAST  Goal: Optimize infant feeding at the breast  Description  INTERVENTIONS:  - Latch, breast and nipple assessment  - Assess prior breast feeding history  - Hand expression of breast milk  - For cracked, bleeding and or sore nipples reassess latch, treat damaged nipple  -Educate mother on feeding cues  -Positioning/latch techniques  Outcome: Progressing     Problem: INADEQUATE LATCH, SUCK OR SWALLOW  Goal: Demonstrate ability to latch and sustain latch, audible swallowing and satiety  Description  INTERVENTIONS:  - Assess oral anatomy, notify Physician/AP for abnormal findings  - Establish milk expression  - Maximize feeding opportunity (skin to skin, behavioral state)  - Position/latch techniques  - Discourage use of pacifier-artificial nipple  - Mechanical pumping  - Nipple Shield  - Supplemental formula feeding (Physician/AP order)  - Alternative feeding method  Outcome: Progressing     Problem: POSTPARTUM  Goal: Experiences normal postpartum course  Description  INTERVENTIONS:  - Monitor maternal vital signs  - Assess uterine involution and lochia  Outcome: Progressing  Goal: Appropriate maternal -  bonding  Description  INTERVENTIONS:  - Identify family support  - Assess for appropriate maternal/infant bonding   -Encourage maternal/infant bonding opportunities  - Referral to  or  as needed  Outcome: Progressing  Goal: Establishment of infant feeding pattern  Description  INTERVENTIONS:  - Assess breast/bottle feeding  - Refer to lactation as needed  Outcome: Progressing  Goal: Incision(s), wounds(s) or drain site(s) healing without S/S of infection  Description  INTERVENTIONS  - Assess and document risk factors for skin impairment   - Assess and document dressing, incision, wound bed, drain sites and surrounding tissue  - Consider nutrition services referral as needed  - Oral mucous membranes remain intact  - Provide patient/ family education  Outcome: Progressing     Problem: DISCHARGE PLANNING - CARE MANAGEMENT  Goal: Discharge to post-acute care or home with appropriate resources  Description  INTERVENTIONS:  - Conduct assessment to determine patient/family and health care team treatment goals, and need for post-acute services based on payer coverage, community resources, and patient preferences, and barriers to discharge  - Address psychosocial, clinical, and financial barriers to discharge as identified in assessment in conjunction with the patient/family and health care team  - Arrange appropriate level of post-acute services according to patient's   needs and preference and payer coverage in collaboration with the physician and health care team  - Communicate with and update the patient/family, physician, and health care team regarding progress on the discharge plan  - Arrange appropriate transportation to post-acute venues   Outcome: Progressing

## 2020-03-21 NOTE — PLAN OF CARE
Problem: ALTERATION IN THE BREAST  Goal: Optimize infant feeding at the breast  Description  INTERVENTIONS:  - Latch, breast and nipple assessment  - Assess prior breast feeding history  - Hand expression of breast milk  - For cracked, bleeding and or sore nipples reassess latch, treat damaged nipple  -Educate mother on feeding cues  -Positioning/latch techniques  Outcome: Adequate for Discharge

## 2020-03-21 NOTE — PLAN OF CARE
Problem: ALTERATION IN THE BREAST  Goal: Optimize infant feeding at the breast  Description  INTERVENTIONS:  - Latch, breast and nipple assessment  - Assess prior breast feeding history  - Hand expression of breast milk  - For cracked, bleeding and or sore nipples reassess latch, treat damaged nipple  -Educate mother on feeding cues  -Positioning/latch techniques  3/21/2020 1502 by Lynn Patterson RN  Outcome: Completed  3/21/2020 1011 by Lynn Patterson RN  Outcome: Adequate for Discharge

## 2020-03-21 NOTE — PROGRESS NOTES
Post- Progress note    Patient is post-op day 2 from a Repeat Low Transverse  delivery with bilateral salpingectomy  Pain: no--controlled with oral meds  Tolerating Oral Intake: yes  Voiding: yes  Flatus: yes  Bowel Movement: yes  Ambulating: yes  Breastfeeding: Breastfeeding  Chest Pain: no  Shortness of Breath: no  Leg Pain/Discomfort: no  Lochia: normal      Objective:   Vitals:    20 0822   BP: 105/60   Pulse: 83   Resp: 18   Temp: 98 9 °F (37 2 °C)   SpO2: 96%     No intake or output data in the 24 hours ending 20 1404    Physical Exam:  General: in no apparent distress, well developed and well nourished, non-toxic, in no respiratory distress and acyanotic, alert, oriented times 3, afebrile and cooperative  Cardiovascular: Cor RRR  Lungs: clear to auscultation bilaterally  Abdomen: abdomen is soft without significant tenderness, masses, organomegaly or guarding  Fundus: Firm and non-tender, 2 below the umbilicus  Incision: clean, dry, and intact  Lower extremeties: nontender      Labs/Tests:   Admission on 2020   Component Date Value    WBC 2020 6 64     RBC 2020 4 25     Hemoglobin 2020 12 7     Hematocrit 2020 38 5     MCV 2020 91     MCH 2020 29 9     MCHC 2020 33 0     RDW 2020 13 7     Platelets  233     MPV 2020 11 5     RPR 2020 Non-Reactive     ABO Grouping 2020 O     Rh Factor 2020 Positive     Antibody Screen 2020 Negative     Specimen Expiration Date 2020 [id number]     pH, Cord Meño 2020 7  344     pCO2, Cord Meño 2020 47 7*    pO2, Cord Meño 2020 29 3     HCO3, Cord Meño 2020 25 4    [facility] Exc, Cord Meño 2020 -1 0*    O2 Cont, Cord Meño 2020 17 0     O2 HGB,VENOUS CORD 2020 67 5     pH, Cord Art 2020 7  256     pCO2, Cord Art 2020 62 2*    pO2, Cord Art 2020 19 7     HCO3, Cord Art 2020 27 0     Base Exc, Cord Art 2020 -1 8*    O2 Content, Cord Art 2020 9 3     O2 Hgb, Arterial Cord 2020 38 7     WBC 2020 6 97     RBC 2020 3 25*    Hemoglobin 2020 9 8*    Hematocrit 2020 29 9*    MCV 2020 92     MCH 2020 30 2     MCHC 2020 32 8     RDW 2020 13 8     MPV 2020 11 2     Platelets  151     nRBC 2020 0     Neutrophils Relative 2020 77*    Immat GRANS % 2020 0     Lymphocytes Relative 2020 15     Monocytes Relative 2020 7     Eosinophils Relative 2020 1     Basophils Relative 2020 0     Neutrophils Absolute 2020 5 38     Immature Grans Absolute 2020 0 02     Lymphocytes Absolute 2020 1 03     Monocytes Absolute 2020 0 47     Eosinophils Absolute 2020 0 05     Basophils Absolute 2020 0 02          Assessment and Plan:  26y o  year-old , postoperative day 2 status-post c/s delivery and bilateral salpingectomy delivery    Continue routine postpartum care  Encourage ambulation  Advance diet as tolerated  Patient requests early discharge due to COVID 19 pandemic - reviewed discharge instructions and f/u via telephone in 3 weeks with SWOB;   Avoid routine postpartum visit until situation safer for her to go out of the house    Moises Dumont MD

## 2020-04-01 ENCOUNTER — TELEPHONE (OUTPATIENT)
Dept: OBGYN CLINIC | Facility: CLINIC | Age: 30
End: 2020-04-01

## 2020-04-03 LAB — PLACENTA IN STORAGE: NORMAL

## 2020-04-24 ENCOUNTER — TELEMEDICINE (OUTPATIENT)
Dept: OBGYN CLINIC | Facility: CLINIC | Age: 30
End: 2020-04-24

## 2020-04-24 PROCEDURE — G2012 BRIEF CHECK IN BY MD/QHP: HCPCS | Performed by: OBSTETRICS & GYNECOLOGY

## 2023-11-16 ENCOUNTER — HOSPITAL ENCOUNTER (EMERGENCY)
Facility: HOSPITAL | Age: 33
Discharge: HOME | End: 2023-11-16
Attending: EMERGENCY MEDICINE

## 2023-11-16 VITALS
TEMPERATURE: 97.7 F | HEART RATE: 106 BPM | DIASTOLIC BLOOD PRESSURE: 78 MMHG | RESPIRATION RATE: 18 BRPM | SYSTOLIC BLOOD PRESSURE: 128 MMHG

## 2023-11-16 DIAGNOSIS — J10.1 INFLUENZA A: ICD-10-CM

## 2023-11-16 DIAGNOSIS — M54.50 ACUTE LOW BACK PAIN: Primary | ICD-10-CM

## 2023-11-16 LAB
BILIRUB UR QL STRIP: NEGATIVE
BILIRUB UR QL STRIP: NEGATIVE
CLARITY UR: CLEAR
CLARITY UR: CLEAR
COLOR UR: YELLOW
COLOR UR: YELLOW
EXT PREGNANCY TEST URINE: NEGATIVE
EXT. CONTROL: NORMAL
FLUAV RNA RESP QL NAA+PROBE: POSITIVE
FLUBV RNA RESP QL NAA+PROBE: NEGATIVE
GLUCOSE UR STRIP-MCNC: NEGATIVE MG/DL
GLUCOSE UR STRIP-MCNC: NEGATIVE MG/DL
HGB UR QL STRIP.AUTO: NEGATIVE
HGB UR QL STRIP.AUTO: NEGATIVE
KETONES UR STRIP-MCNC: NEGATIVE MG/DL
KETONES UR STRIP-MCNC: NEGATIVE MG/DL
LEUKOCYTE ESTERASE UR QL STRIP: NEGATIVE
LEUKOCYTE ESTERASE UR QL STRIP: NEGATIVE
NITRITE UR QL STRIP: NEGATIVE
NITRITE UR QL STRIP: NEGATIVE
PH UR STRIP.AUTO: 6 [PH] (ref 4.5–8)
PH UR STRIP.AUTO: 6 [PH] (ref 4.5–8)
PROT UR STRIP-MCNC: NEGATIVE MG/DL
PROT UR STRIP-MCNC: NEGATIVE MG/DL
RSV RNA RESP QL NAA+PROBE: NEGATIVE
SARS-COV-2 RNA RESP QL NAA+PROBE: NEGATIVE
SP GR UR STRIP.AUTO: 1.01 (ref 1–1.03)
SP GR UR STRIP.AUTO: 1.01 (ref 1–1.03)
UROBILINOGEN UR QL STRIP.AUTO: 0.2 E.U./DL
UROBILINOGEN UR QL STRIP.AUTO: 0.2 E.U./DL

## 2023-11-16 PROCEDURE — 96372 THER/PROPH/DIAG INJ SC/IM: CPT

## 2023-11-16 PROCEDURE — 0241U HB NFCT DS VIR RESP RNA 4 TRGT: CPT

## 2023-11-16 PROCEDURE — 99284 EMERGENCY DEPT VISIT MOD MDM: CPT | Performed by: EMERGENCY MEDICINE

## 2023-11-16 PROCEDURE — 81003 URINALYSIS AUTO W/O SCOPE: CPT

## 2023-11-16 PROCEDURE — 99283 EMERGENCY DEPT VISIT LOW MDM: CPT

## 2023-11-16 PROCEDURE — 81025 URINE PREGNANCY TEST: CPT

## 2023-11-16 RX ORDER — ACETAMINOPHEN 325 MG/1
975 TABLET ORAL ONCE
Status: COMPLETED | OUTPATIENT
Start: 2023-11-16 | End: 2023-11-16

## 2023-11-16 RX ORDER — KETOROLAC TROMETHAMINE 30 MG/ML
15 INJECTION, SOLUTION INTRAMUSCULAR; INTRAVENOUS ONCE
Status: COMPLETED | OUTPATIENT
Start: 2023-11-16 | End: 2023-11-16

## 2023-11-16 RX ORDER — METHOCARBAMOL 500 MG/1
500 TABLET, FILM COATED ORAL ONCE
Status: COMPLETED | OUTPATIENT
Start: 2023-11-16 | End: 2023-11-16

## 2023-11-16 RX ORDER — LIDOCAINE 50 MG/G
2 PATCH TOPICAL ONCE
Status: DISCONTINUED | OUTPATIENT
Start: 2023-11-16 | End: 2023-11-16 | Stop reason: HOSPADM

## 2023-11-16 RX ADMIN — ACETAMINOPHEN 975 MG: 325 TABLET, FILM COATED ORAL at 18:42

## 2023-11-16 RX ADMIN — LIDOCAINE 2 PATCH: 700 PATCH TOPICAL at 18:42

## 2023-11-16 RX ADMIN — KETOROLAC TROMETHAMINE 15 MG: 30 INJECTION, SOLUTION INTRAMUSCULAR at 18:42

## 2023-11-16 RX ADMIN — METHOCARBAMOL 500 MG: 500 TABLET ORAL at 18:43

## 2023-11-16 NOTE — ED PROVIDER NOTES
History  Chief Complaint   Patient presents with    Back Pain     Pt reports lower back pain, denies NVD. Tylenol @ 1600     Patient is a 75-year-old female no past medical history presenting to the ED for evaluation of acute onset low back pain x1 day. Patient reports that she was driving when her symptoms began suddenly, complaining of bilateral lower lumbar back pain and associated bilateral lower extremity pain described as achiness. Also complaining of congestion and cough that started yesterday no fever subjective chills. Denies any chest pain shortness of breath abdominal pain nausea vomiting. Denies any numbness or weakness or tingling to the lower extremities. Denies urinary retention urinary or fecal incontinence dysuria hematuria frequency. Took Tylenol at home which gave some relief to symptoms. Denies any other complaints at this time. Prior to Admission Medications   Prescriptions Last Dose Informant Patient Reported? Taking?    Biotin 55825 MCG TABS  Self Yes No   Sig: Take by mouth   Prenatal Vit-Fe Fumarate-FA (PRENATAL COMPLETE) 14-0.4 MG TABS  Self No No   Sig: Take 1 tablet by mouth daily   Patient not taking: Reported on 2020   acetaminophen (TYLENOL) 325 mg tablet   No No   Sig: Take 2 tablets (650 mg total) by mouth every 4 (four) hours as needed for mild pain   ibuprofen (MOTRIN) 600 mg tablet   No No   Sig: Take 1 tablet (600 mg total) by mouth every 6 (six) hours as needed for moderate pain   Patient not taking: Reported on 2020      Facility-Administered Medications: None       Past Medical History:   Diagnosis Date    Migraine     Obese     Varicella     vac       Past Surgical History:   Procedure Laterality Date     SECTION      x2,  (Male)  (Female), Last assessed: 14, per Allscripts    [place]  DELIVERY ONLY N/A 3/19/2018    Procedure:  SECTION () REPEAT;  Surgeon: Suraj Epstein MD;  Location: [facility] ;  Service: Obstetrics    [place]  DELIVERY ONLY N/A 3/19/2020    Procedure:  SECTION () REPEAT;  Surgeon: Elva Askew DO;  Location: [facility] ;  Service: Obstetrics       Family History   Problem Relation Age of Onset    No Known Problems Mother     No Known Problems Father     No Known Problems Daughter     No Known Problems Son     Other Brother         went missing  [address]    No Known Problems Daughter      I have reviewed and agree with the history as documented. E-Cigarette/Vaping    E-Cigarette Use Never User      E-Cigarette/Vaping Substances    Nicotine No     THC No     CBD No     Flavoring No     Other No     Unknown No      Social History     Tobacco Use    Smoking status: Never    Smokeless tobacco: Never   Vaping Use    Vaping Use: Never used   Substance Use Topics    Alcohol use: No    Drug use: No        Review of Systems   Constitutional:  Positive for chills. Negative for fever. HENT:  Positive for congestion. Negative for ear pain and sore throat. Eyes:  Negative for pain and visual disturbance. Respiratory:  Positive for cough. Negative for shortness of breath. Cardiovascular:  Negative for chest pain and palpitations. Gastrointestinal:  Negative for abdominal pain and vomiting. Genitourinary:  Negative for decreased urine volume, difficulty urinating, dysuria, flank pain, frequency and hematuria. Musculoskeletal:  Positive for back pain and myalgias. Negative for arthralgias. Skin:  Negative for color change and rash. Neurological:  Negative for dizziness, seizures, syncope, weakness, light-headedness, numbness and headaches. All other systems reviewed and are negative.       Physical Exam  ED Triage Vitals   Temperature Pulse Respirations Blood Pressure SpO2   23 1634 23 1634 23 1634 23 1634 --   97.7 °F (36.5 °C) (!) 106 18 128/78       Temp Source Heart Rate Source Patient Position - Orthostatic VS BP Location FiO2 (%)   23 [id number] 11/16/23 1634 11/16/23 1634 11/16/23 1634 --   Tympanic Monitor Sitting Left arm       Pain Score       11/16/23 1842       7             Orthostatic Vital Signs  Vitals:    11/16/23 1634   BP: 128/78   Pulse: (!) 106   Patient Position - Orthostatic VS: Sitting       Physical Exam  Vitals and nursing note reviewed. Constitutional:       General: She is not in acute distress. Appearance: She is well-developed. She is not ill-appearing, toxic-appearing or diaphoretic. HENT:      Head: Normocephalic and atraumatic. Nose: Congestion present. Mouth/Throat:      Mouth: Mucous membranes are moist.   Eyes:      Extraocular Movements: Extraocular movements intact. Conjunctiva/sclera: Conjunctivae normal.   Cardiovascular:      Rate and Rhythm: Normal rate and regular rhythm. Heart sounds: No murmur heard. Pulmonary:      Effort: Pulmonary effort is normal. No respiratory distress. Breath sounds: Normal breath sounds. Abdominal:      Palpations: Abdomen is soft. Tenderness: There is no abdominal tenderness. Musculoskeletal:         General: No swelling. Normal range of motion. Cervical back: Normal, normal range of motion and neck supple. No tenderness. Thoracic back: Normal.      Lumbar back: Tenderness present. No bony tenderness. Negative right straight leg raise test and negative left straight leg raise test.      Right lower leg: No edema. Left lower leg: No edema. Skin:     General: Skin is warm and dry. Capillary Refill: Capillary refill takes less than 2 seconds. Neurological:      General: No focal deficit present. Mental Status: She is alert and oriented to person, place, and time.          ED Medications  Medications   lidocaine (LIDODERM) 5 % patch 2 patch (2 patches Topical Medication Applied 11/16/23 1842)   acetaminophen (TYLENOL) tablet 975 mg (975 mg Oral Given 11/16/23 1842)   methocarbamol (ROBAXIN) tablet 500 mg (500 mg Oral Given 11/16/23 1843)   ketorolac (TORADOL) injection 15 mg (15 mg Intramuscular Given 11/16/23 1842)       Diagnostic Studies  Results Reviewed       Procedure Component Value Units Date/Time    Urine Macroscopic, POC [[id number]] Collected: 11/16/23 1952    Lab Status: Final result Specimen: Urine Updated: 11/16/23 1954     Color, UA Yellow     Clarity, UA Clear     pH, UA 6.0     Leukocytes, UA Negative     Nitrite, UA Negative     Protein, UA Negative mg/dl      Glucose, UA Negative mg/dl      Ketones, UA Negative mg/dl      Urobilinogen, UA 0.2 E.U./dl      Bilirubin, UA Negative     Occult Blood, UA Negative     Specific Gravity, UA 1.010    Narrative:      CLINITEK RESULT    FLU/RSV/COVID - if FLU/RSV clinically relevant [[id number]]  (Abnormal) Collected: 11/16/23 1846    Lab Status: Final result Specimen: Nares from Nose Updated: 11/16/23 1940     SARS-CoV-2 Negative     INFLUENZA A PCR Positive     INFLUENZA B PCR Negative     RSV PCR Negative    Narrative:      FOR PEDIATRIC PATIENTS - copy/paste COVID Guidelines URL to browser: [email or web address]    SARS-CoV-2 assay is a Nucleic Acid Amplification assay intended for the  qualitative detection of nucleic acid from SARS-CoV-2 in nasopharyngeal  swabs. Results are for the presumptive identification of SARS-CoV-2 RNA. Positive results are indicative of infection with SARS-CoV-2, the virus  causing COVID-19, but do not rule out bacterial infection or co-infection  with other viruses. Laboratories within the [facility] and its  territories are required to report all positive results to the appropriate  public health authorities. Negative results do not preclude SARS-CoV-2  infection and should not be used as the sole basis for treatment or other  patient management decisions. Negative results must be combined with  clinical observations, patient history, and epidemiological information.   This test has not been FDA cleared or approved. This test has been authorized by FDA under an Emergency Use Authorization  (EUA). This test is only authorized for the duration of time the  declaration that circumstances exist justifying the authorization of the  emergency use of an in vitro diagnostic tests for detection of SARS-CoV-2  virus and/or diagnosis of COVID-19 infection under section 564(b)(1) of  the Act, 21 U. S.C. 986WYM-1(K)(8), unless the authorization is terminated  or revoked sooner. The test has been validated but independent review by FDA  and CLIA is pending. Test performed using [employer]: This RT-PCR assay targets N2,  a region unique to SARS-CoV-2. A conserved region in the E-gene was chosen  for pan-Sarbecovirus detection which includes SARS-CoV-2. According to CMS-2020-01-R, this platform meets the definition of high-throughput technology. POCT pregnancy, urine [[id number]]  (Normal) Resulted: 11/16/23 1853    Lab Status: Final result Updated: 11/16/23 1853     EXT Preg Test, Ur Negative     Control Valid    Urine Macroscopic, POC [[id number]] Collected: 11/16/23 1851    Lab Status: Final result Specimen: Urine Updated: 11/16/23 1853     Color, UA Yellow     Clarity, UA Clear     pH, UA 6.0     Leukocytes, UA Negative     Nitrite, UA Negative     Protein, UA Negative mg/dl      Glucose, UA Negative mg/dl      Ketones, UA Negative mg/dl      Urobilinogen, UA 0.2 E.U./dl      Bilirubin, UA Negative     Occult Blood, UA Negative     Specific Gravity, UA 1.010    Narrative:      CLINITEK RESULT    POCT urinalysis dipstick [[id number]]     Lab Status: No result Specimen: Urine                    No orders to display         Procedures  Procedures      ED Course                                       Medical Decision Making  Patient is a 73-year-old female presenting for evaluation of back pain    No red flag symptoms full strength on exam no sensory deficits unlikely to be cord compression syndrome. Patient did mention upon history taking 1 time that there was some association with pain with urination and back pain however was unable to clarify secondary to language barrier. Unclear if this is dysuria or not. We will plan to assess UA, urine pregnancy and check a postvoid residual if the postvoid residual is normal that reassures me that this is not a cord compression syndrome. Will treat as musculoskeletal low back pain with multimodal analgesia in the interim. Given potential for myalgias and cough and congestion unclear if this is flu versus COVID versus other viral syndrome causing this achy back pain, will check a COVID flu RSV viral swab as well. We will continue to monitor and reassess. No need for further labs or imaging at this time. UA (-) for UTI, postvoid residual normal, reassuring that this is not a cord compression syndrome. Pt (+) for influenza A, likely cause of pt's symptoms. HD stable and clear for d/c with outpatient follow up. Return precautions given pt verbalized understanding    Amount and/or Complexity of Data Reviewed  Labs: ordered. Risk  OTC drugs. Prescription drug management. Disposition  Final diagnoses:   Acute low back pain   Influenza A     Time reflects when diagnosis was documented in both MDM as applicable and the Disposition within this note       Time User Action Codes Description Comment    11/16/2023  6:54 PM Gladys Leone Add [M54.50] Acute low back pain     11/16/2023  8:19 PM Gladys Flowers [J10.1] Influenza A           ED Disposition       ED Disposition   Discharge    Condition   Stable    Date/Time   Thu Nov 16, 2023  8:20 PM    Comment   Pankaj Amador discharge to home/self care.                    Follow-up Information       Follow up With Specialties Details Why Contact Info    [employer]fol[employer]  Call in 2 days  [phone]              Patient's Medications   Discharge Prescriptions    No medications on file     No discharge procedures on file. PDMP Review       None             ED Provider  Attending physically available and evaluated Andre Marino. I managed the patient along with the ED Attending.     Electronically Signed by           Vicki Branch DO  11/17/23 0002

## 2023-11-17 NOTE — DISCHARGE INSTRUCTIONS
You were seen and evaluated in the emergency department for back pain and muscle aches. You have the flu. See above attached instructions. Please follow-up with your primary doctor within 48 hours if you do not have 1 call the info link number provided to establish care.   If your symptoms worsen or persist please return to the emergency department for further evaluation and management
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
## 2024-06-27 ENCOUNTER — TELEPHONE (OUTPATIENT)
Age: 34
End: 2024-06-27

## 2024-06-27 NOTE — TELEPHONE ENCOUNTER
Pt called inquiring surgery for skin and fat removal on stomach. Questions regarding pricing and insurance coverage, if cash and payment plans accepted. Reviewed appt would be needed to determine what surgery is needed, review costs, determine if medically necessary and appropriate for insurance vs cosmetic and oop. Offered to schedule appt, pt states her break is over and she will call back later to schedule.

## 2024-11-09 ENCOUNTER — APPOINTMENT (OUTPATIENT)
Dept: PERIOP | Facility: HOSPITAL | Age: 34
End: 2024-11-09

## 2024-11-09 ENCOUNTER — HOSPITAL ENCOUNTER (EMERGENCY)
Facility: HOSPITAL | Age: 34
Discharge: HOME | End: 2024-11-09
Attending: EMERGENCY MEDICINE

## 2024-11-09 ENCOUNTER — ANESTHESIA (EMERGENCY)
Dept: PERIOP | Facility: HOSPITAL | Age: 34
End: 2024-11-09

## 2024-11-09 ENCOUNTER — ANESTHESIA EVENT (EMERGENCY)
Dept: PERIOP | Facility: HOSPITAL | Age: 34
End: 2024-11-09

## 2024-11-09 VITALS
RESPIRATION RATE: 18 BRPM | OXYGEN SATURATION: 95 % | HEART RATE: 84 BPM | DIASTOLIC BLOOD PRESSURE: 72 MMHG | TEMPERATURE: 98 F | SYSTOLIC BLOOD PRESSURE: 141 MMHG

## 2024-11-09 DIAGNOSIS — T54.91XA INGESTION OF CORROSIVE CHEMICAL, ACCIDENTAL OR UNINTENTIONAL, INITIAL ENCOUNTER: Primary | ICD-10-CM

## 2024-11-09 LAB
ANION GAP SERPL CALCULATED.3IONS-SCNC: 6 MMOL/L (ref 4–13)
BASOPHILS # BLD AUTO: 0.03 THOUSANDS/ÂΜL (ref 0–0.1)
BASOPHILS NFR BLD AUTO: 0 % (ref 0–1)
BUN SERPL-MCNC: 21 MG/DL (ref 5–25)
CALCIUM SERPL-MCNC: 8.6 MG/DL (ref 8.4–10.2)
CHLORIDE SERPL-SCNC: 109 MMOL/L (ref 96–108)
CO2 SERPL-SCNC: 23 MMOL/L (ref 21–32)
CREAT SERPL-MCNC: 0.77 MG/DL (ref 0.6–1.3)
EOSINOPHIL # BLD AUTO: 0.22 THOUSAND/ÂΜL (ref 0–0.61)
EOSINOPHIL NFR BLD AUTO: 3 % (ref 0–6)
ERYTHROCYTE [DISTWIDTH] IN BLOOD BY AUTOMATED COUNT: 14.5 % (ref 11.6–15.1)
EXT PREGNANCY TEST URINE: NEGATIVE
EXT. CONTROL: NORMAL
GFR SERPL CREATININE-BSD FRML MDRD: 99 ML/MIN/1.73SQ M
GLUCOSE SERPL-MCNC: 89 MG/DL (ref 65–140)
HCT VFR BLD AUTO: 36.6 % (ref 34.8–46.1)
HGB BLD-MCNC: 11.5 G/DL (ref 11.5–15.4)
IMM GRANULOCYTES # BLD AUTO: 0.03 THOUSAND/UL (ref 0–0.2)
IMM GRANULOCYTES NFR BLD AUTO: 0 % (ref 0–2)
LYMPHOCYTES # BLD AUTO: 1.77 THOUSANDS/ÂΜL (ref 0.6–4.47)
LYMPHOCYTES NFR BLD AUTO: 22 % (ref 14–44)
MCH RBC QN AUTO: 27.6 PG (ref 26.8–34.3)
MCHC RBC AUTO-ENTMCNC: 31.4 G/DL (ref 31.4–37.4)
MCV RBC AUTO: 88 FL (ref 82–98)
MONOCYTES # BLD AUTO: 0.46 THOUSAND/ÂΜL (ref 0.17–1.22)
MONOCYTES NFR BLD AUTO: 6 % (ref 4–12)
NEUTROPHILS # BLD AUTO: 5.54 THOUSANDS/ÂΜL (ref 1.85–7.62)
NEUTS SEG NFR BLD AUTO: 69 % (ref 43–75)
NRBC BLD AUTO-RTO: 0 /100 WBCS
PLATELET # BLD AUTO: 322 THOUSANDS/UL (ref 149–390)
PMV BLD AUTO: 11 FL (ref 8.9–12.7)
POTASSIUM SERPL-SCNC: 3.8 MMOL/L (ref 3.5–5.3)
RBC # BLD AUTO: 4.17 MILLION/UL (ref 3.81–5.12)
SODIUM SERPL-SCNC: 138 MMOL/L (ref 135–147)
WBC # BLD AUTO: 8.05 THOUSAND/UL (ref 4.31–10.16)

## 2024-11-09 PROCEDURE — 99204 OFFICE O/P NEW MOD 45 MIN: CPT | Performed by: INTERNAL MEDICINE

## 2024-11-09 PROCEDURE — 99283 EMERGENCY DEPT VISIT LOW MDM: CPT

## 2024-11-09 PROCEDURE — 96361 HYDRATE IV INFUSION ADD-ON: CPT

## 2024-11-09 PROCEDURE — 99285 EMERGENCY DEPT VISIT HI MDM: CPT | Performed by: EMERGENCY MEDICINE

## 2024-11-09 PROCEDURE — 85025 COMPLETE CBC W/AUTO DIFF WBC: CPT

## 2024-11-09 PROCEDURE — 81025 URINE PREGNANCY TEST: CPT | Performed by: STUDENT IN AN ORGANIZED HEALTH CARE EDUCATION/TRAINING PROGRAM

## 2024-11-09 PROCEDURE — 36415 COLL VENOUS BLD VENIPUNCTURE: CPT

## 2024-11-09 PROCEDURE — 96374 THER/PROPH/DIAG INJ IV PUSH: CPT

## 2024-11-09 PROCEDURE — 43235 EGD DIAGNOSTIC BRUSH WASH: CPT | Performed by: INTERNAL MEDICINE

## 2024-11-09 PROCEDURE — 80048 BASIC METABOLIC PNL TOTAL CA: CPT

## 2024-11-09 RX ORDER — SUCCINYLCHOLINE/SOD CL,ISO/PF 100 MG/5ML
SYRINGE (ML) INTRAVENOUS AS NEEDED
Status: DISCONTINUED | OUTPATIENT
Start: 2024-11-09 | End: 2024-11-09

## 2024-11-09 RX ORDER — FENTANYL CITRATE/PF 50 MCG/ML
25 SYRINGE (ML) INJECTION
Status: DISCONTINUED | OUTPATIENT
Start: 2024-11-09 | End: 2024-11-09 | Stop reason: HOSPADM

## 2024-11-09 RX ORDER — PROPOFOL 10 MG/ML
INJECTION, EMULSION INTRAVENOUS AS NEEDED
Status: DISCONTINUED | OUTPATIENT
Start: 2024-11-09 | End: 2024-11-09

## 2024-11-09 RX ORDER — SODIUM CHLORIDE, SODIUM LACTATE, POTASSIUM CHLORIDE, CALCIUM CHLORIDE 600; 310; 30; 20 MG/100ML; MG/100ML; MG/100ML; MG/100ML
INJECTION, SOLUTION INTRAVENOUS CONTINUOUS PRN
Status: DISCONTINUED | OUTPATIENT
Start: 2024-11-09 | End: 2024-11-09

## 2024-11-09 RX ORDER — FENTANYL CITRATE 50 UG/ML
INJECTION, SOLUTION INTRAMUSCULAR; INTRAVENOUS AS NEEDED
Status: DISCONTINUED | OUTPATIENT
Start: 2024-11-09 | End: 2024-11-09

## 2024-11-09 RX ORDER — MORPHINE SULFATE 4 MG/ML
4 INJECTION, SOLUTION INTRAMUSCULAR; INTRAVENOUS ONCE
Status: COMPLETED | OUTPATIENT
Start: 2024-11-09 | End: 2024-11-09

## 2024-11-09 RX ORDER — PROMETHAZINE HYDROCHLORIDE 25 MG/ML
25 INJECTION, SOLUTION INTRAMUSCULAR; INTRAVENOUS ONCE AS NEEDED
Status: DISCONTINUED | OUTPATIENT
Start: 2024-11-09 | End: 2024-11-09 | Stop reason: HOSPADM

## 2024-11-09 RX ORDER — SODIUM CHLORIDE, SODIUM LACTATE, POTASSIUM CHLORIDE, CALCIUM CHLORIDE 600; 310; 30; 20 MG/100ML; MG/100ML; MG/100ML; MG/100ML
125 INJECTION, SOLUTION INTRAVENOUS CONTINUOUS
Status: CANCELLED | OUTPATIENT
Start: 2024-11-09

## 2024-11-09 RX ORDER — ONDANSETRON 2 MG/ML
INJECTION INTRAMUSCULAR; INTRAVENOUS AS NEEDED
Status: DISCONTINUED | OUTPATIENT
Start: 2024-11-09 | End: 2024-11-09

## 2024-11-09 RX ORDER — DEXAMETHASONE SODIUM PHOSPHATE 10 MG/ML
INJECTION, SOLUTION INTRAMUSCULAR; INTRAVENOUS AS NEEDED
Status: DISCONTINUED | OUTPATIENT
Start: 2024-11-09 | End: 2024-11-09

## 2024-11-09 RX ORDER — LIDOCAINE HYDROCHLORIDE 10 MG/ML
INJECTION, SOLUTION EPIDURAL; INFILTRATION; INTRACAUDAL; PERINEURAL AS NEEDED
Status: DISCONTINUED | OUTPATIENT
Start: 2024-11-09 | End: 2024-11-09

## 2024-11-09 RX ADMIN — LIDOCAINE HYDROCHLORIDE 50 MG: 10 INJECTION, SOLUTION EPIDURAL; INFILTRATION; INTRACAUDAL; PERINEURAL at 19:00

## 2024-11-09 RX ADMIN — PROPOFOL 120 MG: 10 INJECTION, EMULSION INTRAVENOUS at 19:00

## 2024-11-09 RX ADMIN — DEXAMETHASONE SODIUM PHOSPHATE 10 MG: 10 INJECTION, SOLUTION INTRAMUSCULAR; INTRAVENOUS at 19:00

## 2024-11-09 RX ADMIN — Medication 100 MG: at 19:00

## 2024-11-09 RX ADMIN — ONDANSETRON 4 MG: 2 INJECTION INTRAMUSCULAR; INTRAVENOUS at 19:00

## 2024-11-09 RX ADMIN — MORPHINE SULFATE 4 MG: 4 INJECTION INTRAVENOUS at 17:15

## 2024-11-09 RX ADMIN — SODIUM CHLORIDE, SODIUM LACTATE, POTASSIUM CHLORIDE, AND CALCIUM CHLORIDE: .6; .31; .03; .02 INJECTION, SOLUTION INTRAVENOUS at 18:55

## 2024-11-09 RX ADMIN — FENTANYL CITRATE 25 MCG: 50 INJECTION INTRAMUSCULAR; INTRAVENOUS at 19:07

## 2024-11-09 RX ADMIN — FENTANYL CITRATE 25 MCG: 50 INJECTION INTRAMUSCULAR; INTRAVENOUS at 19:00

## 2024-11-09 RX ADMIN — SODIUM CHLORIDE 1000 ML: 0.9 INJECTION, SOLUTION INTRAVENOUS at 17:14

## 2024-11-09 NOTE — CONSULTS
[facility] - Inpatient Consultation    PATIENT INFORMATION      Abena Faria 34 y.o. female MRN: [id number]  Unit/Bed#: ED 01 Encounter: [id number]  PCP: No primary care provider on file.  Date of Admission:  11/9/2024  Date of Consultation: 11/09/24  Requesting Physician: Gerber Bailey MD       ASSESSMENT & PLAN     Abena Faria is a 34 y.o. old female with no significant past medical history who presented with accidental ingestion of fabuloso .  GI was consulted for emergent endoscopy to assess for caustic ingestion.    Accidental Fabuloso ingestion  Concern for caustic injury  Patient is presenting with concerns of caustic injury due to fabuloso  ingestion which was in a water bottle.  Patient currently endorsing throat pain however is having no difficulty breathing.  Endorsing mild pain with swallowing.    -Patient n.p.o.  -Will take patient for emergent EGD due to ingestion to assess for any mucosal damage.  -Appreciate recs by toxicology      REASON FOR CONSULTATION      Fabuloso ingestion      HISTORY OF PRESENT ILLNESS      Abena Faria is a 34 y.o. old female with no significant past medical history who presented with accidental ingestion of fabuloso .  GI was consulted for emergent endoscopy to assess for caustic ingestion.    Patient stated she accidentally picked up a water bottle and took 2 large gulps before realizing it was fabuloso .  Patient immediately drank some water however started to notice difficulty in increasing pain with swallowing in her throat area.  Patient felt nauseous however did not have any episode of vomiting.  She currently denies any difficulty breathing or worsening pain.      REVIEW OF SYSTEMS     CONSTITUTIONAL: Denies any fever, chills, rigors, and weight loss  HEENT: No earache or tinnitus, denies hearing loss or visual disturbances  CARDIOVASCULAR: No chest pain or palpitations   RESPIRATORY: Denies any  cough, hemoptysis, shortness of breath or dyspnea on exertion  GASTROINTESTINAL: As noted in the History of Present Illness   GENITOURINARY: No problems with urination, denies any hematuria or dysuria  NEUROLOGIC: No dizziness or vertigo, denies headaches   MUSCULOSKELETAL: Denies any muscle or joint pain   SKIN: Denies skin rashes or itching   ENDOCRINE: Denies excessive thirst, denies intolerance to heat or cold  PSYCHOSOCIAL: Denies depression or anxiety, denies any recent memory loss     PAST MEDICAL & SURGICAL HISTORY      Past Medical History:   Diagnosis Date    Migraine     Obese     Varicella     vac       Past Surgical History:   Procedure Laterality Date     SECTION      x2,  (Male)  (Female), Last assessed: 14, per Allscripts    [place]  DELIVERY ONLY N/A 3/19/2018    Procedure:  SECTION () REPEAT;  Surgeon: Susan Pelletier MD;  Location: [facility];  Service: Obstetrics    [place]  DELIVERY ONLY N/A 3/19/2020    Procedure:  SECTION () REPEAT;  Surgeon: Henok Harvey DO;  Location: [facility] ;  Service: Obstetrics       MEDICATIONS & ALLERGIES       Medications:   Not in a hospital admission.    Current Facility-Administered Medications:     sodium chloride 0.9 % bolus 1,000 mL, Once, Last Rate: 1,000 mL (24 1714)    Allergies:   No Known Allergies    SOCIAL HISTORY      Social History     Marital Status: /Civil Union    Substance Use History:   Social History     Substance and Sexual Activity   Alcohol Use No     Social History     Tobacco Use   Smoking Status Never   Smokeless Tobacco Never     Social History     Substance and Sexual Activity   Drug Use No       FAMILY HISTORY      Family History   Problem Relation Age of Onset    No Known Problems Mother     No Known Problems Father     No Known Problems Daughter     No Known Problems Son     Other Brother         went missing  [facility]    No Known Problems Daughter         PHYSICAL EXAM     Objective   Blood pressure 121/58, pulse 95, temperature (!) 97.4 °F (36.3 °C), temperature source Temporal, resp. rate 18, SpO2 99%, currently breastfeeding. There is no height or weight on file to calculate BMI.  No intake or output data in the 24 hours ending 11/09/24 1757    General Appearance:   Alert, cooperative, no distress   HEENT:   Normocephalic, atraumatic, anicteric     Neck:   Supple, symmetrical, trachea midline   Lungs:   Equal chest rise, respirations unlabored    Heart:   Regular rate and rhythm   Abdomen:   Soft, non-tender, non-distended; normal bowel sounds; no masses, no organomegaly    Rectal:   Deferred    Extremities:   No cyanosis, clubbing or edema    Neuro:   Moves all 4 extremities    Skin:   No jaundice, rashes, or lesions      ADDITIONAL DATA     Lab Results:     Results from last 7 days   Lab Units 11/09/24  1713   WBC Thousand/uL 8.05   HEMOGLOBIN g/dL 11.5   HEMATOCRIT % 36.6   PLATELETS Thousands/uL 322   SEGS PCT % 69   LYMPHO PCT % 22   MONO PCT % 6   EOS PCT % 3     Results from last 7 days   Lab Units 11/09/24  1713   POTASSIUM mmol/L 3.8   CHLORIDE mmol/L 109*   CO2 mmol/L 23   BUN mg/dL 21   CREATININE mg/dL 0.77   CALCIUM mg/dL 8.6           Imaging:    No results found.    EKG, Pathology, and Other Studies Reviewed on Admission:   EKG: Reviewed      Counseling / Coordination of Care Time: 30 total mins spent in consult. Greater than 50% of total time spent on patient counseling and coordination of care.    Loretta Flores MD  Gastroenterology Fellow  [facility]  Division of Gastroenterology and Hepatology    ...............................................................................................................................................  ** Please Note: This note is constructed using a voice recognition dictation system. **

## 2024-11-09 NOTE — TELEMEDICINE
e-Consult (IPC)  - Medical Toxicology   Name: Abena Faria 34 y.o. female I MRN: [id number]  Unit/Bed#: ED 01 I Date of Admission: 11/9/2024   Date of Service: 11/9/2024 I Hospital Day: 0  Inpatient consult to Toxicology  Consult performed by: Trent Miranda DO  Consult ordered by: Gerber Bailey MD        Physician Requesting Evaluation: Gerber Bailey MD   Reason for Evaluation / Principal Problem: Caustic ingestion    Assessment & Plan  Ingestion of corrosive chemical, accidental or unintentional, initial encounter  No role for activated charcoal as this will obscure EGD field of view  Agree with consulting GI for evaluating given that patient is symptomatic   Supportive care, IVF, pain meds and anti emetics PRN  Monitor airway closely and intubate early if signs of respiratory compromise  ED team does suspect this was accidental. If there is any suspicion for intentional ingestion / SI recommend psychiatry evaluation and self harm precautions  No role for empiric steroids, only if there is  IIB injury on EGD  Would also check CXR    Please see additional teaching note below:   Consult Discussion: Caustics  Medical Toxicology  [facility]      Pathophysiology  Caustics are xenobiotics that cause damage on contact with tissue surfaces  Agents that can cause caustic injuries include  Acids  Alkalis  Oxidizing agents  Exothermic agents  Some hydrocarbons  Determinants of injuries in acid or alkaline exposures include  Titratable acid or alkaline reserve (TAR) - this is the amount of neutralizing substances required to bring the pH back to physiologic pH. The higher the TAR, the more severe the injury.  Concentration  Contact time with tissue  pH - generally the more acidic or alkaline the more severe the injury  Acids generally cause significant injuries when pH is below 3.  Desiccate epithelial cells resulting in eschar formation leading to what is termed coagulative  necrosis. The eschar limits the depth of injury, but severe penetrating injuries can still arise.  The esophagus may be spared while severe damage can occur in the stomach. Acid-induced pyloric spasm can result in antral pooling and perforation.  Alkalis generally cause significant injuries when pH is above 11.  They cause protein dissolution, collagen destruction, fat saponification and deeply penetrating injuries termed as liquefactive necrosis. The risk of perforation and complications is higher than with coagulative necrosis.  Sodium hypochlorite (bleach) ingestion is generally safe when household products (3%) are involved. Severe injuries arise when there is a large ingestion of concentrated products (industrial).  Household detergents and cationic detergents (usually found in fabric softener) can cause significant injuries even with accidental ingestion.  Other agents cause injuries by local contact with tissue and alkylation, oxidation, denaturing of protein, defatting of tissue and burns from exothermic agents.  Surgical complications include  Acute - perforation, fistula formation, severe hemorrhage  Chronic - stricture formation, carcinoma of the esophagus  Systemic effects can arise due to systemic absorption of xenobiotics and also from unique toxicities of different agents.  Hypotension from third spacing due to tissue edema may occur as well as significant hemorrhage.  Metabolic acidosis can arise due to lactic acidosis from tissue necrosis and from the absorption of acids.  Other agents have unique systemic toxicity. Some examples are  Hydrofluoric acid exposure and oxalic acid ingestion can cause hypocalcemia and accompanying arrhythmias.   Zinc and mercuric chloride can give rise to heavy metal toxicity.   Phenol can cause seizures, methemoglobinemia, hemolysis, renal failure and hepatotoxicity.  Cationic surfactant when absorbed can result in acidosis, seizure, hypotension, coma and  death.  Paraquat causes pulmonary fibrosis.  Phosphorus is renal and hepatotoxic.  Permanganate and silver nitrate may cause methemoglobinemia.  Tannic acid may cause hepatic injuries.    Clinical Features  Inhalation  Upper respiratory tract injury - stridor, hoarseness, airway edema  Lower respiratory tract injury - acute lung injury  Skin and eye  Pain, blistering, and burns  Ingestion  Patients may present with pain and swelling of upper digestive tract, and/or substernal chest pain and epigastric pain.  Oropharyngeal edema and burns can lead to airway obstruction.  Epigastric tenderness may be present from stomach burns. Abdominal rigidity may indicate cuauhtemoc perforation.     Diagnostics for Ingestion  Endoscopy  Indicated in  All patients with intentional ingestion  Unintentional ingestion with presence of pain, vomiting, drooling, stridor or who are unable to tolerate diet  Generally should not be performed from 48hrs to 2/52 after ingestion due to high risk of perforation.  Esophagus, stomach and duodenum should be visualized for injuries.  Grade of Esophageal Injuries  I - mucosal hyperemia or edema  II - submucosal lesion   IIa - noncircumferential   IIb - circumferential  III - deep ulceration or necrosis  Imaging  CXR and Abd XR are useful to look for perforation which will show up as pneumomediastinum, pneumothorax, pneumoperitoneum or pneumopericard-ium. However, these modalities are not sensitive.  CT imaging is more sensitive than X-rays in detecting perforation.  Contrast imaging allows visualization of extent of injury and should be performed when endoscopy cannot be completed. Water soluble contrast should be given if perforation is suspected as they are less irritating if extravasation occurs.  Laboratory  pH - to assess for acidosis  PTPTT - prolongation is associate with severe injury  Blood count, group and cross match and electrolytes    Management  Resuscitation. Symptomatic and supportive  care.  Irrigate skin copiously with water.  Eye exposure should be irrigated with copious amounts of normal saline until pH returns to 7.4. Test with litmus paper and retest after 15-30min to allow for caustics to leech out from tissue. Refer to an ophthalmologist for further management.  Airway  Direct visualization of the cords is indicated in patients with:  Drooling  Change of voice or stridor  Severe upper airway burns or swelling  Respiratory distress  When intubating use smaller ET tubes. A surgical airway should be available.  IV Dexamethasone 10mg (0.6 mg/kg up to 10 mg in children) can be given to reduce swelling.  Gastric decontamination is not indicated as there is a risk of reintroduction of the caustics into the upper aerodigestive track and aspiration. Activated charcoal is not indicated as most caustics are not absorbed, and endoscopic management is hampered.  Cautious aspiration with a narrow nasogastric tube may be indicated with large acidic ingestions presenting within 30 mins, or with zinc and mercuric chloride ingestions.  Dilution with milk and water may be attempted in patients who are able to tolerate fluids. Studies show only minimal efficacy when done within 30 min of exposure.  Neutralization of caustics is contraindicated as the exothermic reaction produces more tissue injury.  Endoscopic examination should be performed as indicated. Patients with grade I and IIa injuries can be treated symptomatically and started on a diet as tolerated. Patients with Grade IIb and III injuries need to be followed up for surgical complications.  Specific antidotes can be used for specific agents like calcium for hydrofluoric burns.            References  Kurtis NINO, Dave THOMAS. Caustics. In: Goldfrank’s Toxicologic Emergencies. 8th ed.  Caustics. In: Poisoning and Drug Overdose. 5th ed.     For further questions, please contact the medical  on call via SecureChat between 8am and 9pm. If between  9pm and 8am, please reach out to the Poison Center at [phone].     Hx and PE limited by the dynamics of a phone consultation. I have not personally interviewed or evaluated the patient, but only advised based on the information provided to me. Primary provider is responsible for all clinical decisions.     History of Present Illness   Abena Faria is a 34 y.o. year old female who presents with ingestion of 2 large gulps of Fabuloso . Reported to be accidental. The contents were in a water bottle and the pt drank from the bottle. Uncertain of the exact ingredients at this time. Did initially vomit. Does have significant throat pain and is clearing her throat constantly. Airway in tact currently and tolerating her secretions but does have pain with swallowing.     Historical Information   I have reviewed the patient's PMH, PSH, Social History, Family History, Meds, and Allergies  Social History     Tobacco Use    Smoking status: Never    Smokeless tobacco: Never   Vaping Use    Vaping status: Never Used   Substance and Sexual Activity    Alcohol use: No    Drug use: No    Sexual activity: Yes     Partners: Male     Birth control/protection: None     Family History   Problem Relation Age of Onset    No Known Problems Mother     No Known Problems Father     No Known Problems Daughter     No Known Problems Son     Other Brother         went missing [facility]    No Known Problems Daughter        Meds/Allergies   Prior to Admission medications    Medication Sig Start Date End Date Taking? Authorizing Provider   acetaminophen (TYLENOL) 325 mg tablet Take 2 tablets (650 mg total) by mouth every 4 (four) hours as needed for mild pain 3/21/20   Ana Laura Delgado MD   Biotin 60985 MCG TABS Take by mouth    Historical Provider, MD   ibuprofen (MOTRIN) 600 mg tablet Take 1 tablet (600 mg total) by mouth every 6 (six) hours as needed for moderate pain  Patient not taking: Reported on 4/24/2020 3/21/20   Ana Laura  MD Danny   Prenatal Vit-Fe Fumarate-FA (PRENATAL COMPLETE) 14-0.4 MG TABS Take 1 tablet by mouth daily  Patient not taking: Reported on 4/24/2020 7/29/19   Arianna Lang PA-C     Current Facility-Administered Medications:     morphine injection 4 mg, 4 mg, Intravenous, Once, Diego Thapa MD    sodium chloride 0.9 % bolus 1,000 mL, 1,000 mL, Intravenous, Once, Diego Thapa MD    Current Outpatient Medications:     acetaminophen (TYLENOL) 325 mg tablet, Take 2 tablets (650 mg total) by mouth every 4 (four) hours as needed for mild pain, Disp: 30 tablet, Rfl: 0    Biotin 85016 MCG TABS, Take by mouth, Disp: , Rfl:     ibuprofen (MOTRIN) 600 mg tablet, Take 1 tablet (600 mg total) by mouth every 6 (six) hours as needed for moderate pain (Patient not taking: Reported on 4/24/2020), Disp: 30 tablet, Rfl: 0    Prenatal Vit-Fe Fumarate-FA (PRENATAL COMPLETE) 14-0.4 MG TABS, Take 1 tablet by mouth daily (Patient not taking: Reported on 4/24/2020), Disp: 30 each, Rfl: 0   No Known Allergies    Objective :  Temp:  [97.4 °F (36.3 °C)] 97.4 °F (36.3 °C)  HR:  [95] 95  BP: (121)/(58) 121/58  Resp:  [18] 18  SpO2:  [99 %] 99 %  O2 Device: None (Room air)    No intake or output data in the 24 hours ending 11/09/24 1714    Physical exam not performed.      Lab Results: I have reviewed the following results:                               Imaging Results Review: No pertinent imaging studies reviewed.  Other Study Results Review: No additional pertinent studies reviewed.    Administrative Statements   11-20 minutes, >50% of the total time devoted to medical consultative verbal/EMR discussion between providers. Written report will be generated in the EMR.    Patient or appropriate family member was verbally informed by Dr. Bailey of this consultative service on their behalf to provide more timely access to specialty care in lieu of an in person consultation. Verbal consent was obtained.

## 2024-11-09 NOTE — ANESTHESIA PREPROCEDURE EVALUATION
Procedure:  EGD    Relevant Problems   ANESTHESIA (within normal limits)      CARDIO (within normal limits)      ENDO (within normal limits)      GI/HEPATIC (within normal limits)      /RENAL (within normal limits)      GYN   (+) 32 weeks gestation of pregnancy   (+) 37 weeks gestation of pregnancy      HEMATOLOGY (within normal limits)      MUSCULOSKELETAL (within normal limits)      NEURO/PSYCH (within normal limits)      PULMONARY (within normal limits)             Anesthesia Plan  ASA Score- 2 Emergent    Anesthesia Type- general with ASA Monitors.         Additional Monitors:     Airway Plan: ETT.    Comment: Abena Faria is a 34 y.o. female with PMHx significant for obesity, accidental ingestion of caustic fluid (Fabuloso cleaning solution) presenting today for EGD    Plan: GETA w/ RSI, PIVx1-2, standard ASA monitors. Standard PONV ppx.     Anesthesia plan and consent discussed with patient who expressed understanding and agreement. Risks/benefits and alternatives discussed with patient including possible PONV, sore throat, damage to teeth/lips/gums and possibility of rare anesthetic and surgical emergencies. Plan discussed and confirmed with CRNA.          .       Plan Factors-Exercise tolerance (METS): >4 METS.    Chart reviewed. EKG reviewed. Imaging results reviewed. Existing labs reviewed. Patient summary reviewed.    Patient is not a current smoker.  Patient instructed to abstain from smoking on day of procedure. Patient did not smoke on day of surgery.    Obstructive sleep apnea risk education given perioperatively.        Induction- intravenous.    Postoperative Plan- Plan for postoperative opioid use.     Perioperative Resuscitation Plan - Level 1 - Full Code.       Informed Consent- Anesthetic plan and risks discussed with patient.  I personally reviewed this patient with the CRNA. Discussed and agreed on the Anesthesia Plan with the CRNA..

## 2024-11-09 NOTE — ED PROVIDER NOTES
"Time reflects when diagnosis was documented in both MDM as applicable and the Disposition within this note       Time User Action Codes Description Comment    2024  5:06 PM Gerber Bailey Add [T54.91XA] Ingestion of corrosive chemical, accidental or unintentional, initial encounter           ED Disposition       ED Disposition   Discharge    Condition   Stable    Date/Time   Sat 2024  7:45 PM    Comment   Abena Faria discharge to home/self care.                   Assessment & Plan       Medical Decision Making  Amount and/or Complexity of Data Reviewed  Labs: ordered. Decision-making details documented in ED Course.    Risk  Prescription drug management.      Patient is a 34-year-old female with presenting here today after caustic ingestion.  This patient likely needs to be admitted and needs a scope.  I have ordered a CBC, BMP, fluids, and morphine for pain.  I have consulted GI and toxicology as well.    Patient discharged home.    ED Course as of 11/10/24 0025   Sat 2024   1944 According to GI:  \"EGD completed, completely normal. she can be discharged from our standpoint and can resume regular diet\"    CBC and differential  Normal    Basic metabolic panel(!)  Reassuring labs       Medications   morphine injection 4 mg (4 mg Intravenous Given 24 1715)   sodium chloride 0.9 % bolus 1,000 mL (0 mL Intravenous Stopped 24 181)       ED Risk Strat Scores                                               History of Present Illness       Chief Complaint   Patient presents with    Medical Problem     Pt reports mistaking a bottle of water for fabulosa. Pt drink 2 large sips.       Past Medical History:   Diagnosis Date    Migraine     Obese     Varicella     vac      Past Surgical History:   Procedure Laterality Date     SECTION      x2,  (Male)  (Female), Last assessed: 14, per [employer]    [place] ONLY N/A 3/19/2018    Procedure:  SECTION " () REPEAT;  Surgeon: Susan Pelletier MD;  Location: [facility];  Service: Obstetrics    [place]  DELIVERY ONLY N/A 3/19/2020    Procedure:  SECTION () REPEAT;  Surgeon: Henok Harvey DO;  Location: [facility];  Service: Obstetrics      Family History   Problem Relation Age of Onset    No Known Problems Mother     No Known Problems Father     No Known Problems Daughter     No Known Problems Son     Other Brother         went missing  [facility]    No Known Problems Daughter       Social History     Tobacco Use    Smoking status: Never    Smokeless tobacco: Never   Vaping Use    Vaping status: Never Used   Substance Use Topics    Alcohol use: No    Drug use: No      E-Cigarette/Vaping    E-Cigarette Use Never User       E-Cigarette/Vaping Substances    Nicotine No     THC No     CBD No     Flavoring No     Other No     Unknown No       I have reviewed and agree with the history as documented.     Patient is a 34-year-old female with no pertinent past medical history who presents here today after drinking Fabuloso  40 minutes before I assessed her.  Patient says that she had 2-1/2 large gulps and she had mistake that for water because it was in a water bottle.  Patient says that she has no suicidal ideation.  Currently patient is complaining of throat pain, however she is not having any trouble with breathing.  She did try to cause herself to vomit which she was unable to do.  Patient has not eaten or drank anything since the incident.  She is not complaining of nausea at the current moment either.  Patient has no abdominal pain.      Medical Problem  Associated symptoms: sore throat    Associated symptoms: no abdominal pain, no chest pain, no cough, no ear pain, no fever, no rash, no shortness of breath, no vomiting and no wheezing        Review of Systems   Constitutional:  Negative for chills and fever.   HENT:  Positive for sore throat and trouble swallowing. Negative for ear pain  and voice change.    Eyes:  Negative for pain and visual disturbance.   Respiratory:  Negative for apnea, cough, choking, chest tightness, shortness of breath, wheezing and stridor.    Cardiovascular:  Negative for chest pain and palpitations.   Gastrointestinal:  Negative for abdominal pain and vomiting.   Genitourinary:  Negative for dysuria and hematuria.   Musculoskeletal:  Negative for arthralgias and back pain.   Skin:  Negative for color change and rash.   Neurological:  Negative for seizures and syncope.   All other systems reviewed and are negative.          Objective       ED Triage Vitals   Temperature Pulse Blood Pressure Respirations SpO2 Patient Position - Orthostatic VS   11/09/24 1622 11/09/24 1622 11/09/24 1622 11/09/24 1622 11/09/24 1622 11/09/24 1622   (!) 97.4 °F (36.3 °C) 95 121/58 18 99 % Sitting      Temp Source Heart Rate Source BP Location FiO2 (%) Pain Score    11/09/24 1622 11/09/24 1622 11/09/24 1622 -- 11/09/24 1624    Temporal Monitor Left arm  8      Vitals      Date and Time Temp Pulse SpO2 Resp BP Pain Score FACES Pain Rating User   11/09/24 1933 98 °F (36.7 °C) -- 95 % 18 141/72 No Pain -- LA   11/09/24 1930 -- -- -- 18 -- -- -- LA   11/09/24 1918 98.6 °F (37 °C) 84 96 % 17 118/66 No Pain -- LA   11/09/24 1624 -- -- -- -- -- 8 -- CS   11/09/24 1622 97.4 °F (36.3 °C) 95 99 % 18 121/58 -- -- CS            Physical Exam  Vitals and nursing note reviewed.   Constitutional:       General: She is not in acute distress.     Appearance: She is well-developed.   HENT:      Head: Normocephalic and atraumatic.   Eyes:      Conjunctiva/sclera: Conjunctivae normal.   Neck:      Comments: Continuously clearing throat while in the room  Cardiovascular:      Rate and Rhythm: Normal rate and regular rhythm.      Heart sounds: No murmur heard.  Pulmonary:      Effort: Pulmonary effort is normal. No respiratory distress.      Breath sounds: Normal breath sounds.   Abdominal:      Palpations: Abdomen  is soft.      Tenderness: There is no abdominal tenderness.   Musculoskeletal:         General: No swelling.      Cervical back: Neck supple.   Skin:     General: Skin is warm and dry.      Capillary Refill: Capillary refill takes less than 2 seconds.   Neurological:      Mental Status: She is alert.   Psychiatric:         Mood and Affect: Mood normal.         Results Reviewed       Procedure Component Value Units Date/Time    POCT pregnancy, urine [[id number]]  (Normal) Resulted: 11/09/24 2000    Lab Status: Final result Updated: 11/09/24 2000     EXT Preg Test, Ur Negative     Control Valid    Basic metabolic panel [[id number]]  (Abnormal) Collected: 11/09/24 1713    Lab Status: Final result Specimen: Blood from Line, Venous Updated: 11/09/24 1747     Sodium 138 mmol/L      Potassium 3.8 mmol/L      Chloride 109 mmol/L      CO2 23 mmol/L      ANION GAP 6 mmol/L      BUN 21 mg/dL      Creatinine 0.77 mg/dL      Glucose 89 mg/dL      Calcium 8.6 mg/dL      eGFR 99 ml/min/1.73sq m     Narrative:      National Kidney Disease Foundation guidelines for Chronic Kidney Disease (CKD):     Stage 1 with normal or high GFR (GFR > 90 mL/min/1.73 square meters)    Stage 2 Mild CKD (GFR = 60-89 mL/min/1.73 square meters)    Stage 3A Moderate CKD (GFR = 45-59 mL/min/1.73 square meters)    Stage 3B Moderate CKD (GFR = 30-44 mL/min/1.73 square meters)    Stage 4 Severe CKD (GFR = 15-29 mL/min/1.73 square meters)    Stage 5 End Stage CKD (GFR <15 mL/min/1.73 square meters)  Note: GFR calculation is accurate only with a steady state creatinine    CBC and differential [[id number]] Collected: 11/09/24 1713    Lab Status: Final result Specimen: Blood from Line, Venous Updated: 11/09/24 1727     WBC 8.05 Thousand/uL      RBC 4.17 Million/uL      Hemoglobin 11.5 g/dL      Hematocrit 36.6 %      MCV 88 fL      MCH 27.6 pg      MCHC 31.4 g/dL      RDW 14.5 %      MPV 11.0 fL      Platelets 322 Thousands/uL      nRBC 0 /100 WBCs      Segmented  % 69 %      Immature Grans % 0 %      Lymphocytes % 22 %      Monocytes % 6 %      Eosinophils Relative 3 %      Basophils Relative 0 %      Absolute Neutrophils 5.54 Thousands/µL      Absolute Immature Grans 0.03 Thousand/uL      Absolute Lymphocytes 1.77 Thousands/µL      Absolute Monocytes 0.46 Thousand/µL      Eosinophils Absolute 0.22 Thousand/µL      Basophils Absolute 0.03 Thousands/µL             No orders to display       Procedures    ED Medication and Procedure Management   Prior to Admission Medications   Prescriptions Last Dose Informant Patient Reported? Taking?   Biotin 28746 MCG TABS  Self Yes No   Sig: Take by mouth   Prenatal Vit-Fe Fumarate-FA (PRENATAL COMPLETE) 14-0.4 MG TABS  Self No No   Sig: Take 1 tablet by mouth daily   Patient not taking: Reported on 4/24/2020   acetaminophen (TYLENOL) 325 mg tablet   No No   Sig: Take 2 tablets (650 mg total) by mouth every 4 (four) hours as needed for mild pain   ibuprofen (MOTRIN) 600 mg tablet   No No   Sig: Take 1 tablet (600 mg total) by mouth every 6 (six) hours as needed for moderate pain   Patient not taking: Reported on 4/24/2020      Facility-Administered Medications: None     Discharge Medication List as of 11/9/2024  7:48 PM        CONTINUE these medications which have NOT CHANGED    Details   acetaminophen (TYLENOL) 325 mg tablet Take 2 tablets (650 mg total) by mouth every 4 (four) hours as needed for mild pain, Starting Sat 3/21/2020, Normal      Biotin 59556 MCG TABS Take by mouth, Historical Med      ibuprofen (MOTRIN) 600 mg tablet Take 1 tablet (600 mg total) by mouth every 6 (six) hours as needed for moderate pain, Starting Sat 3/21/2020, Normal      Prenatal Vit-Fe Fumarate-FA (PRENATAL COMPLETE) 14-0.4 MG TABS Take 1 tablet by mouth daily, Starting Mon 7/29/2019, Normal           No discharge procedures on file.  ED SEPSIS DOCUMENTATION   Time reflects when diagnosis was documented in both MDM as applicable and the Disposition within  this note       Time User Action Codes Description Comment    11/9/2024  5:06 PM Gerber Bailey Add [T54.91XA] Ingestion of corrosive chemical, accidental or unintentional, initial encounter                  Diego Thapa MD  11/10/24 0025

## 2024-11-09 NOTE — ED ATTENDING ATTESTATION
11/9/2024  I, Gerber Bailey MD, saw and evaluated the patient. I have discussed the patient with the resident/non-physician practitioner and agree with the resident's/non-physician practitioner's findings, Plan of Care, and MDM as documented in the resident's/non-physician practitioner's note, except where noted. All available labs and Radiology studies were reviewed.  I was present for key portions of any procedure(s) performed by the resident/non-physician practitioner and I was immediately available to provide assistance.       At this point I agree with the current assessment done in the Emergency Department.  I have conducted an independent evaluation of this patient a history and physical is as follows:    34-year-old woman presenting after caustic ingestion.  Patient's family had a bottle of Fabuloso  and had transferred into a water bottle.  The patient today took 2 large gulps out of the bottle thinking it was a bottle of water.  She adamantly denies intentional ingestion or any suicidal ideation.  States she had onset of burning to her throat immediately after the ingestion.  She is now having worsening throat pain and is having pain with swallowing and some difficulty swallowing, although she is able to tolerate secretions.  She induced vomiting x 1 right after the event but has not had any further nausea or vomiting.  No chest pain or abdominal pain.  No shortness of breath.  Patient tried to drink some water after the event but was not able to tolerate this.  On examination patient awake and alert, uncomfortable appearing.  She is frequently clearing her throat.  She does have normal phonation.  Oropharynx is clear.  Tolerating secretions.  Neck supple.  Heart regular rate and rhythm, no murmurs rubs or gallops.  Lungs clear to auscultation throughout.  No stridor.  Discussed with toxicology and GI.  Plan for endoscopy this evening.    ED Course         Critical Care Time  Procedures

## 2024-11-10 NOTE — DISCHARGE INSTRUCTIONS
Dear Abena,    You are seen in the [facility] emergency department after an accidental ingestion of Fabuloso .  While you were here, we gave you medications for your pain, and got some labs.  Your labs were all fine, we also did a EGD on you which showed that everything was okay.  You may resume normal diet.    If you have any concerning symptoms like loss of consciousness, seizures, inability to eat or drink, or any other concerning symptoms, please come back to the emergency room.    Thank you for trusting us with your care.

## 2024-11-10 NOTE — ANESTHESIA POSTPROCEDURE EVALUATION
Post-Op Assessment Note    CV Status:  Stable  Pain Score: 0    Pain management: adequate       Mental Status:  Alert and awake   Hydration Status:  Euvolemic   PONV Controlled:  Controlled   Airway Patency:  Patent     Post Op Vitals Reviewed: Yes    No anethesia notable event occurred.    Staff: CRNA           Last Filed PACU Vitals:  Vitals Value Taken Time   Temp 98.6    Pulse 84 11/09/24 1920   /66 11/09/24 1919   Resp 16 11/09/24 1920   SpO2 96 % 11/09/24 1920   Vitals shown include unfiled device data.    Modified Humberto:  No data recorded

## 2024-11-10 NOTE — ANESTHESIA POSTPROCEDURE EVALUATION
Post-Op Assessment Note    CV Status:  Stable  Pain Score: 0    Pain management: adequate       Mental Status:  Alert and awake   Hydration Status:  Euvolemic   PONV Controlled:  Controlled   Airway Patency:  Patent     Post Op Vitals Reviewed: Yes    No anethesia notable event occurred.    Staff: CRNA, Anesthesiologist           Last Filed PACU Vitals:  Vitals Value Taken Time   Temp 98.6    Pulse 84 11/09/24 1920   /66 11/09/24 1919   Resp 16 11/09/24 1920   SpO2 96 % 11/09/24 1920   Vitals shown include unfiled device data.    Modified Humberto:  Activity: 2 (11/9/2024  7:33 PM)  Respiration: 2 (11/9/2024  7:33 PM)  Circulation: 2 (11/9/2024  7:33 PM)  Consciousness: 2 (11/9/2024  7:33 PM)  Oxygen Saturation: 2 (11/9/2024  7:33 PM)  Modified Humberto Score: 10 (11/9/2024  7:33 PM)

## 2025-04-03 ENCOUNTER — APPOINTMENT (OUTPATIENT)
Dept: LAB | Facility: CLINIC | Age: 35
End: 2025-04-03
Payer: COMMERCIAL

## 2025-04-03 ENCOUNTER — TRANSCRIBE ORDERS (OUTPATIENT)
Dept: LAB | Facility: CLINIC | Age: 35
End: 2025-04-03

## 2025-04-03 ENCOUNTER — OFFICE VISIT (OUTPATIENT)
Dept: LAB | Facility: CLINIC | Age: 35
End: 2025-04-03

## 2025-04-03 DIAGNOSIS — Z00.00 EXAMINATION: ICD-10-CM

## 2025-04-03 DIAGNOSIS — Z00.00 EXAMINATION: Primary | ICD-10-CM

## 2025-04-03 LAB
ALBUMIN SERPL BCG-MCNC: 4.3 G/DL (ref 3.5–5)
ALP SERPL-CCNC: 70 U/L (ref 34–104)
ALT SERPL W P-5'-P-CCNC: 17 U/L (ref 7–52)
ANION GAP SERPL CALCULATED.3IONS-SCNC: 9 MMOL/L (ref 4–13)
AST SERPL W P-5'-P-CCNC: 20 U/L (ref 13–39)
B-HCG SERPL-ACNC: <0.6 MIU/ML (ref 0–5)
BILIRUB SERPL-MCNC: 0.27 MG/DL (ref 0.2–1)
BUN SERPL-MCNC: 17 MG/DL (ref 5–25)
CALCIUM SERPL-MCNC: 9.1 MG/DL (ref 8.4–10.2)
CHLORIDE SERPL-SCNC: 107 MMOL/L (ref 96–108)
CO2 SERPL-SCNC: 24 MMOL/L (ref 21–32)
CREAT SERPL-MCNC: 0.47 MG/DL (ref 0.6–1.3)
ERYTHROCYTE [DISTWIDTH] IN BLOOD BY AUTOMATED COUNT: 15.2 % (ref 11.6–15.1)
GFR SERPL CREATININE-BSD FRML MDRD: 126 ML/MIN/1.73SQ M
GLUCOSE SERPL-MCNC: 80 MG/DL (ref 65–140)
HCT VFR BLD AUTO: 35.4 % (ref 34.8–46.1)
HGB BLD-MCNC: 10.6 G/DL (ref 11.5–15.4)
MCH RBC QN AUTO: 25.7 PG (ref 26.8–34.3)
MCHC RBC AUTO-ENTMCNC: 29.9 G/DL (ref 31.4–37.4)
MCV RBC AUTO: 86 FL (ref 82–98)
PLATELET # BLD AUTO: 342 THOUSANDS/UL (ref 149–390)
PMV BLD AUTO: 11.4 FL (ref 8.9–12.7)
POTASSIUM SERPL-SCNC: 3.9 MMOL/L (ref 3.5–5.3)
PROT SERPL-MCNC: 7 G/DL (ref 6.4–8.4)
RBC # BLD AUTO: 4.12 MILLION/UL (ref 3.81–5.12)
SODIUM SERPL-SCNC: 140 MMOL/L (ref 135–147)
T3FREE SERPL-MCNC: 3.34 PG/ML (ref 2.5–3.9)
T4 FREE SERPL-MCNC: 0.83 NG/DL (ref 0.61–1.12)
T4 SERPL-MCNC: 9.83 UG/DL (ref 6.09–12.23)
TSH SERPL DL<=0.05 MIU/L-ACNC: 0.52 UIU/ML (ref 0.45–4.5)
WBC # BLD AUTO: 5.89 THOUSAND/UL (ref 4.31–10.16)

## 2025-04-03 PROCEDURE — 84702 CHORIONIC GONADOTROPIN TEST: CPT

## 2025-04-03 PROCEDURE — 84479 ASSAY OF THYROID (T3 OR T4): CPT

## 2025-04-03 PROCEDURE — 84436 ASSAY OF TOTAL THYROXINE: CPT

## 2025-04-03 PROCEDURE — 84439 ASSAY OF FREE THYROXINE: CPT

## 2025-04-03 PROCEDURE — 87389 HIV-1 AG W/HIV-1&-2 AB AG IA: CPT

## 2025-04-03 PROCEDURE — 93005 ELECTROCARDIOGRAM TRACING: CPT

## 2025-04-03 PROCEDURE — 83036 HEMOGLOBIN GLYCOSYLATED A1C: CPT

## 2025-04-03 PROCEDURE — 84481 FREE ASSAY (FT-3): CPT

## 2025-04-03 PROCEDURE — 80053 COMPREHEN METABOLIC PANEL: CPT

## 2025-04-03 PROCEDURE — 36415 COLL VENOUS BLD VENIPUNCTURE: CPT

## 2025-04-03 PROCEDURE — 85027 COMPLETE CBC AUTOMATED: CPT

## 2025-04-03 PROCEDURE — 84443 ASSAY THYROID STIM HORMONE: CPT

## 2025-04-04 ENCOUNTER — APPOINTMENT (OUTPATIENT)
Dept: LAB | Facility: CLINIC | Age: 35
End: 2025-04-04
Payer: COMMERCIAL

## 2025-04-04 LAB
APTT PPP: 30 SECONDS (ref 23–34)
ATRIAL RATE: 68 BPM
EST. AVERAGE GLUCOSE BLD GHB EST-MCNC: 111 MG/DL
HBA1C MFR BLD: 5.5 %
HIV 1+2 AB+HIV1 P24 AG SERPL QL IA: NORMAL
INR PPP: 1.03 (ref 0.85–1.19)
P AXIS: 0 DEGREES
PR INTERVAL: 138 MS
PROTHROMBIN TIME: 13.8 SECONDS (ref 12.3–15)
QRS AXIS: -26 DEGREES
QRSD INTERVAL: 82 MS
QT INTERVAL: 426 MS
QTC INTERVAL: 452 MS
T WAVE AXIS: -7 DEGREES
VENTRICULAR RATE: 68 BPM

## 2025-04-04 PROCEDURE — 85610 PROTHROMBIN TIME: CPT

## 2025-04-04 PROCEDURE — 85730 THROMBOPLASTIN TIME PARTIAL: CPT

## 2025-04-04 PROCEDURE — 93010 ELECTROCARDIOGRAM REPORT: CPT | Performed by: INTERNAL MEDICINE

## 2025-04-04 PROCEDURE — 36415 COLL VENOUS BLD VENIPUNCTURE: CPT

## 2025-04-05 LAB — T3RU NFR SERPL: 25 % (ref 24–39)

## (undated) DEVICE — PACK C-SECTION PBDS

## (undated) DEVICE — GAUZE SPONGES,16 PLY: Brand: CURITY

## (undated) DEVICE — SUT VICRYL 0 CTX 36 IN J978H

## (undated) DEVICE — SUT VICRYL 0 CT-1 27 IN J260H

## (undated) DEVICE — SUT VICRYL 4-0 KS 27 IN J662H

## (undated) DEVICE — ADHESIVE SKN CLSR HISTOACRYL FLEX 0.5ML LF

## (undated) DEVICE — GLOVE INDICATOR PI UNDERGLOVE SZ 7 BLUE

## (undated) DEVICE — GLOVE SRG BIOGEL ECLIPSE 7

## (undated) DEVICE — GLOVE INDICATOR PI UNDERGLOVE SZ 8 BLUE

## (undated) DEVICE — ABDOMINAL PAD: Brand: DERMACEA

## (undated) DEVICE — PROXIMATE PLUS MD MULTI-DIRECTIONAL RELEASE SKIN STAPLERS CONTAINS 35 STAINLESS STEEL STAPLES APPROXIMATE CLOSED DIMENSIONS: 6.9MM X 3.9MM WIDE: Brand: PROXIMATE

## (undated) DEVICE — GLOVE PI ULTRA TOUCH SZ.7.5

## (undated) DEVICE — SKIN MARKER DUAL TIP WITH RULER CAP, FLEXIBLE RULER AND LABELS: Brand: DEVON

## (undated) DEVICE — CHLORAPREP HI-LITE 26ML ORANGE

## (undated) DEVICE — Device

## (undated) DEVICE — HYDROPHILIC WOUND DRESSING WITH ZINC PLUS VITAMINS A AND B6.: Brand: DERMAGRAN®-B

## (undated) DEVICE — TELFA NON-ADHERENT ABSORBENT DRESSING: Brand: TELFA

## (undated) DEVICE — SUT PLAIN 2-0 CTX 27 IN 872H

## (undated) DEVICE — MEDIUM VISCERA RETAINER: Brand: VISCERA RETAINER, FISH

## (undated) DEVICE — ADHESIVE SKIN HIGH VISCOSITY EXOFIN 1ML

## (undated) DEVICE — MEDI-VAC YANKAUER SUCTION HANDLE W/STRAIGHT TIP & CONTROL VENT: Brand: CARDINAL HEALTH